# Patient Record
Sex: FEMALE | Race: WHITE | NOT HISPANIC OR LATINO | Employment: OTHER | URBAN - METROPOLITAN AREA
[De-identification: names, ages, dates, MRNs, and addresses within clinical notes are randomized per-mention and may not be internally consistent; named-entity substitution may affect disease eponyms.]

---

## 2023-03-16 ENCOUNTER — TELEPHONE (OUTPATIENT)
Dept: PRIMARY CARE | Facility: CLINIC | Age: 88
End: 2023-03-16
Payer: MEDICARE

## 2023-04-11 ENCOUNTER — TELEPHONE (OUTPATIENT)
Dept: PRIMARY CARE | Facility: CLINIC | Age: 88
End: 2023-04-11
Payer: MEDICARE

## 2023-04-11 DIAGNOSIS — M81.0 AGE-RELATED OSTEOPOROSIS WITHOUT CURRENT PATHOLOGICAL FRACTURE: Primary | ICD-10-CM

## 2023-04-25 ENCOUNTER — TELEPHONE (OUTPATIENT)
Dept: PRIMARY CARE | Facility: CLINIC | Age: 88
End: 2023-04-25
Payer: MEDICARE

## 2023-04-27 LAB — CALCIUM (MG/DL) IN SER/PLAS: 9.9 MG/DL (ref 8.6–10.6)

## 2023-06-08 ENCOUNTER — TELEPHONE (OUTPATIENT)
Dept: PRIMARY CARE | Facility: CLINIC | Age: 88
End: 2023-06-08
Payer: MEDICARE

## 2023-06-08 DIAGNOSIS — R73.9 ELEVATED BLOOD SUGAR: ICD-10-CM

## 2023-06-08 DIAGNOSIS — E55.9 VITAMIN D DEFICIENCY: ICD-10-CM

## 2023-06-08 DIAGNOSIS — E78.2 MODERATE MIXED HYPERLIPIDEMIA NOT REQUIRING STATIN THERAPY: ICD-10-CM

## 2023-06-08 DIAGNOSIS — R94.6 ABNORMAL THYROID EXAM: Primary | ICD-10-CM

## 2023-06-14 ENCOUNTER — LAB (OUTPATIENT)
Dept: LAB | Facility: LAB | Age: 88
End: 2023-06-14
Payer: MEDICARE

## 2023-06-14 DIAGNOSIS — E78.2 MODERATE MIXED HYPERLIPIDEMIA NOT REQUIRING STATIN THERAPY: ICD-10-CM

## 2023-06-14 DIAGNOSIS — R94.6 ABNORMAL THYROID EXAM: ICD-10-CM

## 2023-06-14 DIAGNOSIS — R73.9 ELEVATED BLOOD SUGAR: ICD-10-CM

## 2023-06-14 DIAGNOSIS — M81.0 AGE-RELATED OSTEOPOROSIS WITHOUT CURRENT PATHOLOGICAL FRACTURE: ICD-10-CM

## 2023-06-14 DIAGNOSIS — E55.9 VITAMIN D DEFICIENCY: ICD-10-CM

## 2023-06-14 LAB
ALANINE AMINOTRANSFERASE (SGPT) (U/L) IN SER/PLAS: 15 U/L (ref 7–45)
ALBUMIN (G/DL) IN SER/PLAS: 4 G/DL (ref 3.4–5)
ALKALINE PHOSPHATASE (U/L) IN SER/PLAS: 63 U/L (ref 33–136)
ANION GAP IN SER/PLAS: 12 MMOL/L (ref 10–20)
ASPARTATE AMINOTRANSFERASE (SGOT) (U/L) IN SER/PLAS: 21 U/L (ref 9–39)
BILIRUBIN TOTAL (MG/DL) IN SER/PLAS: 0.4 MG/DL (ref 0–1.2)
CALCIDIOL (25 OH VITAMIN D3) (NG/ML) IN SER/PLAS: 69 NG/ML
CALCIUM (MG/DL) IN SER/PLAS: 9.8 MG/DL (ref 8.6–10.6)
CARBON DIOXIDE, TOTAL (MMOL/L) IN SER/PLAS: 28 MMOL/L (ref 21–32)
CHLORIDE (MMOL/L) IN SER/PLAS: 99 MMOL/L (ref 98–107)
CHOLESTEROL (MG/DL) IN SER/PLAS: 183 MG/DL (ref 0–199)
CHOLESTEROL IN HDL (MG/DL) IN SER/PLAS: 61.8 MG/DL
CHOLESTEROL/HDL RATIO: 3
COBALAMIN (VITAMIN B12) (PG/ML) IN SER/PLAS: 549 PG/ML (ref 211–911)
CREATININE (MG/DL) IN SER/PLAS: 0.92 MG/DL (ref 0.5–1.05)
ERYTHROCYTE DISTRIBUTION WIDTH (RATIO) BY AUTOMATED COUNT: 13 % (ref 11.5–14.5)
ERYTHROCYTE MEAN CORPUSCULAR HEMOGLOBIN CONCENTRATION (G/DL) BY AUTOMATED: 33.3 G/DL (ref 32–36)
ERYTHROCYTE MEAN CORPUSCULAR VOLUME (FL) BY AUTOMATED COUNT: 94 FL (ref 80–100)
ERYTHROCYTES (10*6/UL) IN BLOOD BY AUTOMATED COUNT: 3.97 X10E12/L (ref 4–5.2)
GFR FEMALE: 58 ML/MIN/1.73M2
GLUCOSE (MG/DL) IN SER/PLAS: 86 MG/DL (ref 74–99)
HEMATOCRIT (%) IN BLOOD BY AUTOMATED COUNT: 37.2 % (ref 36–46)
HEMOGLOBIN (G/DL) IN BLOOD: 12.4 G/DL (ref 12–16)
LDL: 108 MG/DL (ref 0–99)
LEUKOCYTES (10*3/UL) IN BLOOD BY AUTOMATED COUNT: 6.1 X10E9/L (ref 4.4–11.3)
NRBC (PER 100 WBCS) BY AUTOMATED COUNT: 0 /100 WBC (ref 0–0)
PLATELETS (10*3/UL) IN BLOOD AUTOMATED COUNT: 190 X10E9/L (ref 150–450)
POTASSIUM (MMOL/L) IN SER/PLAS: 5.4 MMOL/L (ref 3.5–5.3)
PROTEIN TOTAL: 6.9 G/DL (ref 6.4–8.2)
SODIUM (MMOL/L) IN SER/PLAS: 134 MMOL/L (ref 136–145)
THYROTROPIN (MIU/L) IN SER/PLAS BY DETECTION LIMIT <= 0.05 MIU/L: 1.8 MIU/L (ref 0.44–3.98)
TRIGLYCERIDE (MG/DL) IN SER/PLAS: 64 MG/DL (ref 0–149)
UREA NITROGEN (MG/DL) IN SER/PLAS: 29 MG/DL (ref 6–23)
VLDL: 13 MG/DL (ref 0–40)

## 2023-06-14 PROCEDURE — 82607 VITAMIN B-12: CPT

## 2023-06-14 PROCEDURE — 85027 COMPLETE CBC AUTOMATED: CPT

## 2023-06-14 PROCEDURE — 36415 COLL VENOUS BLD VENIPUNCTURE: CPT

## 2023-06-14 PROCEDURE — 80053 COMPREHEN METABOLIC PANEL: CPT

## 2023-06-14 PROCEDURE — 84443 ASSAY THYROID STIM HORMONE: CPT

## 2023-06-14 PROCEDURE — 82306 VITAMIN D 25 HYDROXY: CPT

## 2023-06-14 PROCEDURE — 80061 LIPID PANEL: CPT

## 2023-06-14 PROCEDURE — 83036 HEMOGLOBIN GLYCOSYLATED A1C: CPT

## 2023-06-15 LAB
ESTIMATED AVERAGE GLUCOSE FOR HBA1C: 120 MG/DL
HEMOGLOBIN A1C/HEMOGLOBIN TOTAL IN BLOOD: 5.8 %

## 2023-06-16 ENCOUNTER — OFFICE VISIT (OUTPATIENT)
Dept: PRIMARY CARE | Facility: CLINIC | Age: 88
End: 2023-06-16
Payer: MEDICARE

## 2023-06-16 VITALS
BODY MASS INDEX: 22.78 KG/M2 | HEART RATE: 92 BPM | OXYGEN SATURATION: 95 % | SYSTOLIC BLOOD PRESSURE: 151 MMHG | DIASTOLIC BLOOD PRESSURE: 68 MMHG | HEIGHT: 60 IN | WEIGHT: 116 LBS

## 2023-06-16 DIAGNOSIS — M81.0 OSTEOPOROSIS, UNSPECIFIED OSTEOPOROSIS TYPE, UNSPECIFIED PATHOLOGICAL FRACTURE PRESENCE: Primary | ICD-10-CM

## 2023-06-16 PROBLEM — R39.11 URINARY HESITANCY: Status: ACTIVE | Noted: 2023-06-16

## 2023-06-16 PROBLEM — H52.209 ASTIGMATISM: Status: ACTIVE | Noted: 2023-06-07

## 2023-06-16 PROBLEM — G47.33 OBSTRUCTIVE SLEEP APNEA, ADULT: Status: ACTIVE | Noted: 2023-06-16

## 2023-06-16 PROBLEM — F43.0 STRESS REACTION: Status: ACTIVE | Noted: 2023-06-16

## 2023-06-16 PROBLEM — I34.1 MVP (MITRAL VALVE PROLAPSE): Status: ACTIVE | Noted: 2023-06-16

## 2023-06-16 PROBLEM — E87.6 HYPOKALEMIA: Status: ACTIVE | Noted: 2023-06-16

## 2023-06-16 PROBLEM — E87.1 HYPONATREMIA: Status: ACTIVE | Noted: 2023-06-16

## 2023-06-16 PROBLEM — E04.1 THYROID NODULE: Status: ACTIVE | Noted: 2023-06-16

## 2023-06-16 PROBLEM — R41.844 EXECUTIVE FUNCTION DEFICIT: Status: ACTIVE | Noted: 2023-06-16

## 2023-06-16 PROBLEM — H49.22 6TH NERVE PALSY, LEFT: Status: ACTIVE | Noted: 2023-06-07

## 2023-06-16 PROBLEM — R35.0 FREQUENT URINATION: Status: ACTIVE | Noted: 2023-06-16

## 2023-06-16 PROBLEM — H01.009 BLEPHARITIS: Status: ACTIVE | Noted: 2023-06-07

## 2023-06-16 PROBLEM — N95.8 GENITOURINARY SYNDROME OF MENOPAUSE: Status: ACTIVE | Noted: 2023-06-16

## 2023-06-16 PROCEDURE — 1160F RVW MEDS BY RX/DR IN RCRD: CPT | Performed by: FAMILY MEDICINE

## 2023-06-16 PROCEDURE — 1036F TOBACCO NON-USER: CPT | Performed by: FAMILY MEDICINE

## 2023-06-16 PROCEDURE — 99213 OFFICE O/P EST LOW 20 MIN: CPT | Performed by: FAMILY MEDICINE

## 2023-06-16 PROCEDURE — 1159F MED LIST DOCD IN RCRD: CPT | Performed by: FAMILY MEDICINE

## 2023-06-16 RX ORDER — DENOSUMAB 60 MG/ML
1 INJECTION SUBCUTANEOUS
COMMUNITY
Start: 2020-06-10 | End: 2023-10-16

## 2023-06-16 RX ORDER — BIOTIN 1 MG
1000 TABLET ORAL 2 TIMES DAILY
COMMUNITY
Start: 2012-12-20

## 2023-06-16 RX ORDER — CALCIUM CARBONATE 600 MG
600 TABLET ORAL 2 TIMES DAILY
COMMUNITY

## 2023-06-16 RX ORDER — TRIAMCINOLONE ACETONIDE 1 MG/G
CREAM TOPICAL
COMMUNITY
Start: 2022-06-30 | End: 2024-02-05 | Stop reason: ENTERED-IN-ERROR

## 2023-06-16 RX ORDER — CONJUGATED ESTROGENS 0.62 MG/G
CREAM VAGINAL
COMMUNITY
End: 2023-07-10

## 2023-06-16 RX ORDER — ACETAMINOPHEN 325 MG/1
650 TABLET ORAL EVERY 8 HOURS PRN
COMMUNITY

## 2023-06-16 RX ORDER — COLLAGENASE SANTYL 250 [ARB'U]/G
OINTMENT TOPICAL
COMMUNITY
Start: 2022-10-11 | End: 2024-01-18 | Stop reason: WASHOUT

## 2023-06-16 RX ORDER — CYANOCOBALAMIN (VITAMIN B-12) 500 MCG
1 TABLET ORAL DAILY
COMMUNITY

## 2023-06-16 ASSESSMENT — ENCOUNTER SYMPTOMS
OCCASIONAL FEELINGS OF UNSTEADINESS: 0
LOSS OF SENSATION IN FEET: 0
DEPRESSION: 0

## 2023-06-16 ASSESSMENT — PATIENT HEALTH QUESTIONNAIRE - PHQ9
1. LITTLE INTEREST OR PLEASURE IN DOING THINGS: NOT AT ALL
2. FEELING DOWN, DEPRESSED OR HOPELESS: NOT AT ALL
SUM OF ALL RESPONSES TO PHQ9 QUESTIONS 1 AND 2: 0

## 2023-06-16 NOTE — PROGRESS NOTES
Subjective   Patient ID: Nikki Quesada is a 89 y.o. female who presents for Follow-up.    HPI   The patient reports that she is on Prolia for her osteoporosis and is taking this medication as scheduled and has no side effects from it. She mentions that she experiences vaginal itching at nights which improves once she uses the Premarin.     Review of Systems  Constitutional: No fever or chills  Cardiovascular: no chest pain, no palpitations and no syncope.   Respiratory: no cough, no shortness of breath during exertion and no shortness of breath at rest.   Gastrointestinal: no abdominal pain, no nausea and no vomiting.  Neuro: No Headache, no dizziness    Objective   /68   Pulse 92   Ht 1.524 m (5')   Wt 52.6 kg (116 lb)   SpO2 95%   BMI 22.65 kg/m²     Physical Exam  Constitutional: Alert and in no acute distress. Well developed, well nourished  Head and Face: Head and face: Normal.    Cardiovascular: Heart rate and rhythm were normal, normal S1 and S2. No peripheral edema.   Pulmonary: No respiratory distress. Clear bilateral breath sounds.  Musculoskeletal: Gait and station: Normal. Muscle strength/tone: Normal.   Skin: Normal skin color and pigmentation, normal skin turgor, and no rash.    Psychiatric: Judgment and insight: Intact. Mood and affect: Normal.    Lab Results   Component Value Date    WBC 6.1 06/14/2023    HGB 12.4 06/14/2023    HCT 37.2 06/14/2023     06/14/2023    CHOL 183 06/14/2023    TRIG 64 06/14/2023    HDL 61.8 06/14/2023    ALT 15 06/14/2023    AST 21 06/14/2023     (L) 06/14/2023    K 5.4 (H) 06/14/2023    CL 99 06/14/2023    CREATININE 0.92 06/14/2023    BUN 29 (H) 06/14/2023    CO2 28 06/14/2023    TSH 1.80 06/14/2023    INR 1.2 (H) 01/21/2020    HGBA1C 5.8 (A) 06/14/2023       CT HEAD WO IV CONTRAST  MRN: [id number]  Patient Name: NIKKI QUESADA     STUDY:  CT HEAD WO CONTRAST  2/8/2023 3:05 pm     INDICATION:  nausea, syncope, ?fall     COMPARISON:  07/23/2022      ACCESSION NUMBER(S):  [id number]     ORDERING CLINICIAN:  WILIAN SEGURA     TECHNIQUE:  Contiguous axial CT images were obtained at  5 mm slice thickness  through the head  without contrast administration.     FINDINGS:  Mild diffuse volume loss is seen bilaterally.  Mild periventricular  white matter changes are seen.     Gray-white differentiation is intact and there is no evidence of  acute cortical infarct.  No mass, mass effect or midline shift is  seen.  There is no evidence of hemorrhage.   The visualized paranasal  sinuses are clear.     IMPRESSION:  1. Diffuse volume loss and areas of nonspecific white matter  hypodensity, which is nonspecific in nature, but is most likely  age-related and/or microvascular in nature.  2. No  CT evidence of acute hemorrhage or infarct.      Assessment/Plan   Diagnoses and all orders for this visit:  Osteoporosis, unspecified osteoporosis type, unspecified pathological fracture presence  -     Follow Up In [facility] - PCP; Future        Dear Grace Ahn     It was my pleasure to take care of you today in the office. Below are the things we discussed today:    1. Osteoporosis: Continue Prolia.     2. Vaginal irritation: Continue Premarin.     3. Low sodium: Recommended that the patient consume a regular amount of salt.     4. Elevated blood pressures: Advised the patient to check ambulatory blood pressures at home when she is relaxed, if numbers are greater than 140 systolic please let me know.     5. Exercise is recommended.     Follow up in 4 months.     Your yearly Physical is due in: December 2023   When you call the office for your yearly Physical, please ask them to inform me to order your blood work, so that you can get the fasting blood work before your appointment and we can discuss the results at your physical.      Please call me if any questions arise from now until your next visit. I will call you after I am done seeing patients. A Doctor is always  available by phone when the office is closed. Please feel free to call for help with any problem that you feel shouldn't wait until the office re-opens.     .

## 2023-06-19 ENCOUNTER — TELEPHONE (OUTPATIENT)
Dept: PRIMARY CARE | Facility: CLINIC | Age: 88
End: 2023-06-19
Payer: MEDICARE

## 2023-06-19 DIAGNOSIS — M25.569 CHRONIC KNEE PAIN, UNSPECIFIED LATERALITY: ICD-10-CM

## 2023-06-19 DIAGNOSIS — G89.29 CHRONIC KNEE PAIN, UNSPECIFIED LATERALITY: ICD-10-CM

## 2023-06-19 DIAGNOSIS — M51.37 DEGENERATION OF LUMBAR OR LUMBOSACRAL INTERVERTEBRAL DISC: Primary | ICD-10-CM

## 2023-06-19 NOTE — TELEPHONE ENCOUNTER
Pt called in would like a referral for physical therapy for left knee and lower back. Good call back number [phone]

## 2023-07-10 DIAGNOSIS — N95.8 GENITOURINARY SYNDROME OF MENOPAUSE: ICD-10-CM

## 2023-07-10 RX ORDER — CONJUGATED ESTROGENS 0.62 MG/G
CREAM VAGINAL
Qty: 30 G | Refills: 2 | Status: SHIPPED | OUTPATIENT
Start: 2023-07-10

## 2023-08-16 ENCOUNTER — TELEPHONE (OUTPATIENT)
Dept: PRIMARY CARE | Facility: CLINIC | Age: 88
End: 2023-08-16
Payer: MEDICARE

## 2023-08-16 DIAGNOSIS — G89.29 CHRONIC KNEE PAIN, UNSPECIFIED LATERALITY: ICD-10-CM

## 2023-08-16 DIAGNOSIS — M54.2 NECK PAIN: Primary | ICD-10-CM

## 2023-08-16 DIAGNOSIS — M25.569 CHRONIC KNEE PAIN, UNSPECIFIED LATERALITY: ICD-10-CM

## 2023-10-02 ENCOUNTER — TELEPHONE (OUTPATIENT)
Dept: PRIMARY CARE | Facility: CLINIC | Age: 88
End: 2023-10-02
Payer: MEDICARE

## 2023-10-02 DIAGNOSIS — M25.569 CHRONIC KNEE PAIN, UNSPECIFIED LATERALITY: Primary | ICD-10-CM

## 2023-10-02 DIAGNOSIS — M51.37 DEGENERATION OF LUMBAR OR LUMBOSACRAL INTERVERTEBRAL DISC: ICD-10-CM

## 2023-10-02 DIAGNOSIS — G89.29 CHRONIC KNEE PAIN, UNSPECIFIED LATERALITY: Primary | ICD-10-CM

## 2023-10-02 DIAGNOSIS — M54.2 NECK PAIN: ICD-10-CM

## 2023-10-03 NOTE — TELEPHONE ENCOUNTER
Spoke with pt. Daughter. Informed them of below note. Pt expressed understanding. Faxed to number provided

## 2023-10-11 ENCOUNTER — APPOINTMENT (OUTPATIENT)
Dept: UROLOGY | Facility: HOSPITAL | Age: 88
End: 2023-10-11
Payer: MEDICARE

## 2023-10-16 ENCOUNTER — OFFICE VISIT (OUTPATIENT)
Dept: PRIMARY CARE | Facility: CLINIC | Age: 88
End: 2023-10-16
Payer: MEDICARE

## 2023-10-16 VITALS
DIASTOLIC BLOOD PRESSURE: 55 MMHG | BODY MASS INDEX: 22.78 KG/M2 | OXYGEN SATURATION: 92 % | HEART RATE: 65 BPM | WEIGHT: 116 LBS | SYSTOLIC BLOOD PRESSURE: 133 MMHG | HEIGHT: 60 IN

## 2023-10-16 DIAGNOSIS — L97.521 NON-PRESSURE CHRONIC ULCER OF OTHER PART OF LEFT FOOT LIMITED TO BREAKDOWN OF SKIN (MULTI): ICD-10-CM

## 2023-10-16 DIAGNOSIS — M81.0 OSTEOPOROSIS, UNSPECIFIED OSTEOPOROSIS TYPE, UNSPECIFIED PATHOLOGICAL FRACTURE PRESENCE: ICD-10-CM

## 2023-10-16 DIAGNOSIS — Z23 ENCOUNTER FOR IMMUNIZATION: ICD-10-CM

## 2023-10-16 DIAGNOSIS — R06.2 WHEEZING: ICD-10-CM

## 2023-10-16 DIAGNOSIS — M81.0 AGE-RELATED OSTEOPOROSIS WITHOUT CURRENT PATHOLOGICAL FRACTURE: Primary | ICD-10-CM

## 2023-10-16 PROBLEM — R42 DIZZINESS: Status: ACTIVE | Noted: 2023-10-16

## 2023-10-16 PROBLEM — R55 SYNCOPE AND COLLAPSE: Status: ACTIVE | Noted: 2023-10-16

## 2023-10-16 PROBLEM — L28.0 LICHEN SIMPLEX CHRONICUS: Status: ACTIVE | Noted: 2023-07-31

## 2023-10-16 PROBLEM — L81.4 OTHER MELANIN HYPERPIGMENTATION: Status: ACTIVE | Noted: 2023-07-31

## 2023-10-16 PROBLEM — T25.232A: Status: ACTIVE | Noted: 2023-10-16

## 2023-10-16 PROBLEM — L91.8 OTHER HYPERTROPHIC DISORDERS OF THE SKIN: Status: ACTIVE | Noted: 2023-07-31

## 2023-10-16 PROBLEM — G47.34 NOCTURNAL HYPOXEMIA: Status: ACTIVE | Noted: 2023-10-16

## 2023-10-16 PROBLEM — L72.8 OTHER FOLLICULAR CYSTS OF THE SKIN AND SUBCUTANEOUS TISSUE: Status: ACTIVE | Noted: 2023-07-31

## 2023-10-16 PROBLEM — Z85.828 PERSONAL HISTORY OF OTHER MALIGNANT NEOPLASM OF SKIN: Status: ACTIVE | Noted: 2023-07-31

## 2023-10-16 PROBLEM — R90.89 ABNORMAL FINDING ON MRI OF BRAIN: Status: ACTIVE | Noted: 2023-10-16

## 2023-10-16 PROBLEM — N76.0 VAGINITIS: Status: ACTIVE | Noted: 2023-10-16

## 2023-10-16 PROBLEM — N89.8 VAGINAL DISCHARGE: Status: ACTIVE | Noted: 2023-10-16

## 2023-10-16 PROBLEM — L24.A2 IRRITANT CONTACT DERMATITIS DUE TO FECAL, URINARY OR DUAL INCONTINENCE: Status: ACTIVE | Noted: 2023-07-31

## 2023-10-16 PROBLEM — D22.5 MELANOCYTIC NEVI OF TRUNK: Status: ACTIVE | Noted: 2023-07-31

## 2023-10-16 PROBLEM — M62.81 MUSCLE WEAKNESS: Status: ACTIVE | Noted: 2023-10-16

## 2023-10-16 PROBLEM — L30.9 DERMATITIS, UNSPECIFIED: Status: ACTIVE | Noted: 2023-07-31

## 2023-10-16 PROBLEM — L29.9 ITCHING: Status: ACTIVE | Noted: 2023-10-16

## 2023-10-16 PROBLEM — R20.2 PARESTHESIA OF SKIN: Status: ACTIVE | Noted: 2023-07-31

## 2023-10-16 PROBLEM — L27.0 GENERALIZED SKIN ERUPTION DUE TO DRUGS AND MEDICAMENTS TAKEN INTERNALLY: Status: ACTIVE | Noted: 2023-07-31

## 2023-10-16 PROBLEM — R26.2 DIFFICULTY WALKING: Status: ACTIVE | Noted: 2023-10-16

## 2023-10-16 PROBLEM — I83.10 VARICOSE VEINS WITH INFLAMMATION: Status: ACTIVE | Noted: 2023-10-16

## 2023-10-16 PROBLEM — D18.01 HEMANGIOMA OF SKIN AND SUBCUTANEOUS TISSUE: Status: ACTIVE | Noted: 2023-07-31

## 2023-10-16 PROBLEM — M17.12 PRIMARY LOCALIZED OSTEOARTHROSIS OF LEFT LOWER LEG: Status: ACTIVE | Noted: 2023-10-16

## 2023-10-16 PROBLEM — L90.5 SCAR CONDITION AND FIBROSIS OF SKIN: Status: ACTIVE | Noted: 2023-07-31

## 2023-10-16 PROBLEM — R35.1 NOCTURIA: Status: ACTIVE | Noted: 2023-10-16

## 2023-10-16 PROBLEM — D48.5 NEOPLASM OF UNCERTAIN BEHAVIOR OF SKIN: Status: ACTIVE | Noted: 2023-07-31

## 2023-10-16 PROBLEM — L85.3 XEROSIS CUTIS: Status: ACTIVE | Noted: 2023-07-31

## 2023-10-16 PROBLEM — T24.112A: Status: ACTIVE | Noted: 2023-10-16

## 2023-10-16 PROBLEM — L71.9 ROSACEA, UNSPECIFIED: Status: ACTIVE | Noted: 2023-07-31

## 2023-10-16 PROBLEM — T45.1X5D ADVERSE EFFECT OF ANTINEOPLASTIC AND IMMUNOSUPPRESSIVE DRUGS, SUBSEQUENT ENCOUNTER: Status: ACTIVE | Noted: 2023-07-31

## 2023-10-16 PROBLEM — M17.9 OSTEOARTHRITIS, KNEE: Status: ACTIVE | Noted: 2023-10-16

## 2023-10-16 LAB
ALBUMIN SERPL BCP-MCNC: 4.4 G/DL (ref 3.4–5)
ALP SERPL-CCNC: 62 U/L (ref 33–136)
ALT SERPL W P-5'-P-CCNC: 10 U/L (ref 7–45)
ANION GAP SERPL CALC-SCNC: 15 MMOL/L (ref 10–20)
AST SERPL W P-5'-P-CCNC: 18 U/L (ref 9–39)
BILIRUB SERPL-MCNC: 0.5 MG/DL (ref 0–1.2)
BUN SERPL-MCNC: 25 MG/DL (ref 6–23)
CALCIUM SERPL-MCNC: 10.3 MG/DL (ref 8.6–10.6)
CHLORIDE SERPL-SCNC: 98 MMOL/L (ref 98–107)
CO2 SERPL-SCNC: 28 MMOL/L (ref 21–32)
CREAT SERPL-MCNC: 0.92 MG/DL (ref 0.5–1.05)
GFR SERPL CREATININE-BSD FRML MDRD: 58 ML/MIN/1.73M*2
GLUCOSE SERPL-MCNC: 101 MG/DL (ref 74–99)
POTASSIUM SERPL-SCNC: 5.3 MMOL/L (ref 3.5–5.3)
PROT SERPL-MCNC: 7.6 G/DL (ref 6.4–8.2)
SODIUM SERPL-SCNC: 136 MMOL/L (ref 136–145)

## 2023-10-16 PROCEDURE — 1160F RVW MEDS BY RX/DR IN RCRD: CPT | Performed by: FAMILY MEDICINE

## 2023-10-16 PROCEDURE — 80053 COMPREHEN METABOLIC PANEL: CPT

## 2023-10-16 PROCEDURE — 1036F TOBACCO NON-USER: CPT | Performed by: FAMILY MEDICINE

## 2023-10-16 PROCEDURE — 1159F MED LIST DOCD IN RCRD: CPT | Performed by: FAMILY MEDICINE

## 2023-10-16 PROCEDURE — G0008 ADMIN INFLUENZA VIRUS VAC: HCPCS | Performed by: FAMILY MEDICINE

## 2023-10-16 PROCEDURE — 36415 COLL VENOUS BLD VENIPUNCTURE: CPT

## 2023-10-16 PROCEDURE — 90662 IIV NO PRSV INCREASED AG IM: CPT | Performed by: FAMILY MEDICINE

## 2023-10-16 PROCEDURE — 1126F AMNT PAIN NOTED NONE PRSNT: CPT | Performed by: FAMILY MEDICINE

## 2023-10-16 PROCEDURE — 99213 OFFICE O/P EST LOW 20 MIN: CPT | Performed by: FAMILY MEDICINE

## 2023-10-16 RX ORDER — ALBUTEROL SULFATE 0.83 MG/ML
3 SOLUTION RESPIRATORY (INHALATION) AS NEEDED
Status: CANCELLED | OUTPATIENT
Start: 2023-11-09

## 2023-10-16 RX ORDER — FAMOTIDINE 10 MG/ML
20 INJECTION INTRAVENOUS ONCE AS NEEDED
Status: CANCELLED | OUTPATIENT
Start: 2023-11-09

## 2023-10-16 RX ORDER — CHLORHEXIDINE GLUCONATE ORAL RINSE 1.2 MG/ML
15 SOLUTION DENTAL 2 TIMES DAILY
COMMUNITY
Start: 2023-04-20 | End: 2023-10-16 | Stop reason: ALTCHOICE

## 2023-10-16 RX ORDER — ALBUTEROL SULFATE 90 UG/1
2 POWDER, METERED RESPIRATORY (INHALATION) EVERY 6 HOURS PRN
Qty: 18 G | Refills: 2 | Status: SHIPPED | OUTPATIENT
Start: 2023-10-16

## 2023-10-16 RX ORDER — NITROFURANTOIN MACROCRYSTALS 50 MG/1
1 CAPSULE ORAL 2 TIMES DAILY
COMMUNITY
Start: 2022-11-25 | End: 2023-10-16 | Stop reason: DRUGHIGH

## 2023-10-16 RX ORDER — EPINEPHRINE 0.3 MG/.3ML
0.3 INJECTION SUBCUTANEOUS EVERY 5 MIN PRN
Status: CANCELLED | OUTPATIENT
Start: 2023-11-09

## 2023-10-16 RX ORDER — DIPHENHYDRAMINE HYDROCHLORIDE 50 MG/ML
50 INJECTION INTRAMUSCULAR; INTRAVENOUS AS NEEDED
Status: CANCELLED | OUTPATIENT
Start: 2023-11-09

## 2023-10-16 RX ORDER — ALBUTEROL SULFATE 90 UG/1
POWDER, METERED RESPIRATORY (INHALATION) EVERY 6 HOURS
COMMUNITY
Start: 2022-12-06 | End: 2023-10-16 | Stop reason: SDUPTHER

## 2023-10-16 ASSESSMENT — PATIENT HEALTH QUESTIONNAIRE - PHQ9
SUM OF ALL RESPONSES TO PHQ9 QUESTIONS 1 AND 2: 0
1. LITTLE INTEREST OR PLEASURE IN DOING THINGS: NOT AT ALL
2. FEELING DOWN, DEPRESSED OR HOPELESS: NOT AT ALL

## 2023-10-16 ASSESSMENT — ENCOUNTER SYMPTOMS
LOSS OF SENSATION IN FEET: 0
DEPRESSION: 0
OCCASIONAL FEELINGS OF UNSTEADINESS: 0

## 2023-10-16 NOTE — PROGRESS NOTES
Subjective   Patient ID: Nikki Quesada is a 89 y.o. female who presents for Follow-up.    HPI Doing well. Due for Prolia in Nov.  Has shoulder and neck pain getting PT for Neck. Thinking about massage. Occasional wheezing.     Review of Systems  Constitutional: No fever or chills  Cardiovascular: no chest pain, no palpitations and no syncope.   Respiratory: no cough, no shortness of breath during exertion and no shortness of breath at rest.   Gastrointestinal: no abdominal pain, no nausea and no vomiting.  Neuro: No Headache, no dizziness    Objective   /55   Pulse 65   Ht 1.524 m (5')   Wt 52.6 kg (116 lb)   SpO2 92%   BMI 22.65 kg/m²     Physical Exam  Constitutional: Alert and in no acute distress. Well developed, well nourished  Head and Face: Head and face: Normal.    Cardiovascular: Heart rate and rhythm were normal, normal S1 and S2. No peripheral edema.   Pulmonary: No respiratory distress. Clear bilateral breath sounds.  Musculoskeletal: Gait and station: Normal. Muscle strength/tone: Normal.   Skin: Normal skin color and pigmentation, normal skin turgor, and no rash.    Psychiatric: Judgment and insight: Intact. Mood and affect: Normal.    Lab Results   Component Value Date    WBC 6.1 06/14/2023    HGB 12.4 06/14/2023    HCT 37.2 06/14/2023     06/14/2023    CHOL 183 06/14/2023    TRIG 64 06/14/2023    HDL 61.8 06/14/2023    ALT 15 06/14/2023    AST 21 06/14/2023     (L) 06/14/2023    K 5.4 (H) 06/14/2023    CL 99 06/14/2023    CREATININE 0.92 06/14/2023    BUN 29 (H) 06/14/2023    CO2 28 06/14/2023    TSH 1.80 06/14/2023    INR 1.2 (H) 01/21/2020    HGBA1C 5.8 (A) 06/14/2023       CT HEAD WO IV CONTRAST  MRN: [id number]  Patient Name: NIKKI QUESADA     STUDY:  CT HEAD WO CONTRAST  2/8/2023 3:05 pm     INDICATION:  nausea, syncope, ?fall     COMPARISON:  07/23/2022     ACCESSION NUMBER(S):  [id number]     ORDERING CLINICIAN:  WILIAN SEGURA     TECHNIQUE:  Contiguous axial CT images  were obtained at  5 mm slice thickness  through the head  without contrast administration.     FINDINGS:  Mild diffuse volume loss is seen bilaterally.  Mild periventricular  white matter changes are seen.     Gray-white differentiation is intact and there is no evidence of  acute cortical infarct.  No mass, mass effect or midline shift is  seen.  There is no evidence of hemorrhage.   The visualized paranasal  sinuses are clear.     IMPRESSION:  1. Diffuse volume loss and areas of nonspecific white matter  hypodensity, which is nonspecific in nature, but is most likely  age-related and/or microvascular in nature.  2. No  CT evidence of acute hemorrhage or infarct.      Assessment/Plan   Diagnoses and all orders for this visit:  Age-related osteoporosis without current pathological fracture  -     denosumab (Prolia) 60 mg/mL syringe; Inject 1 mL (60 mg total) under the skin every 6 months.  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  -     Aerochamber Spacer Device  Other orders  -     denosumab (Prolia) injection 60 mg  -     sodium chloride 0.9 % bolus 500 mL  -     dextrose 5 % in water (D5W) bolus  -     diphenhydrAMINE (BENADryl) injection 50 mg  -     methylPREDNISolone sod succinate (PF) (SOLU-Medrol) 40 mg/mL injection 40 mg  -     famotidine PF (Pepcid) injection 20 mg  -     EPINEPHrine (Epipen) injection syringe 0.3  mg  -     albuterol 2.5 mg /3 mL (0.083 %) nebulizer solution 3 mL        Dear Grace Ahn     It was my pleasure to take care of you today in the office. Below are the things we discussed today:    1. Osteoporosis - Continue Prolia    2. Wheezing - use albuterol as needed    3. Neck and shoulder Pain - Continue PT      Your yearly Physical is due in: Feb 2024  When you call the office for your yearly Physical, please ask them to inform me to order your blood work, so that you can get the fasting blood work before your appointment and we can discuss the results at your physical.      Please call me if any questions arise from now until your next visit. I will call you after I am done seeing patients. A Doctor is always available by phone when the office is closed. Please feel free to call for help with any problem that you feel shouldn't wait until the office re-opens.     Naima Quiñonez MD

## 2023-10-17 ENCOUNTER — OFFICE VISIT (OUTPATIENT)
Dept: DERMATOLOGY | Facility: CLINIC | Age: 88
End: 2023-10-17
Payer: MEDICARE

## 2023-10-17 DIAGNOSIS — Z12.83 SCREENING EXAM FOR SKIN CANCER: ICD-10-CM

## 2023-10-17 DIAGNOSIS — D22.9 MULTIPLE BENIGN NEVI: ICD-10-CM

## 2023-10-17 DIAGNOSIS — L57.0 ACTINIC KERATOSIS: Primary | ICD-10-CM

## 2023-10-17 DIAGNOSIS — L81.4 LENTIGO: ICD-10-CM

## 2023-10-17 DIAGNOSIS — L82.1 SEBORRHEIC KERATOSIS: ICD-10-CM

## 2023-10-17 DIAGNOSIS — Z85.89 HISTORY OF SQUAMOUS CELL CARCINOMA: ICD-10-CM

## 2023-10-17 DIAGNOSIS — Z85.828 HISTORY OF BASAL CELL CANCER: ICD-10-CM

## 2023-10-17 PROBLEM — L91.8 OTHER HYPERTROPHIC DISORDERS OF THE SKIN: Status: RESOLVED | Noted: 2023-07-31 | Resolved: 2023-10-17

## 2023-10-17 PROBLEM — L72.8 OTHER FOLLICULAR CYSTS OF THE SKIN AND SUBCUTANEOUS TISSUE: Status: RESOLVED | Noted: 2023-07-31 | Resolved: 2023-10-17

## 2023-10-17 PROBLEM — D18.01 HEMANGIOMA OF SKIN AND SUBCUTANEOUS TISSUE: Status: RESOLVED | Noted: 2023-07-31 | Resolved: 2023-10-17

## 2023-10-17 PROBLEM — D22.5 MELANOCYTIC NEVI OF TRUNK: Status: RESOLVED | Noted: 2023-07-31 | Resolved: 2023-10-17

## 2023-10-17 PROBLEM — L30.9 DERMATITIS, UNSPECIFIED: Status: RESOLVED | Noted: 2023-07-31 | Resolved: 2023-10-17

## 2023-10-17 PROBLEM — D48.5 NEOPLASM OF UNCERTAIN BEHAVIOR OF SKIN: Status: RESOLVED | Noted: 2023-07-31 | Resolved: 2023-10-17

## 2023-10-17 PROBLEM — L90.5 SCAR CONDITION AND FIBROSIS OF SKIN: Status: RESOLVED | Noted: 2023-07-31 | Resolved: 2023-10-17

## 2023-10-17 PROCEDURE — 99213 OFFICE O/P EST LOW 20 MIN: CPT | Performed by: DERMATOLOGY

## 2023-10-17 PROCEDURE — 1126F AMNT PAIN NOTED NONE PRSNT: CPT | Performed by: DERMATOLOGY

## 2023-10-17 PROCEDURE — 1159F MED LIST DOCD IN RCRD: CPT | Performed by: DERMATOLOGY

## 2023-10-17 PROCEDURE — 17003 DESTRUCT PREMALG LES 2-14: CPT | Performed by: DERMATOLOGY

## 2023-10-17 PROCEDURE — 1160F RVW MEDS BY RX/DR IN RCRD: CPT | Performed by: DERMATOLOGY

## 2023-10-17 PROCEDURE — 1036F TOBACCO NON-USER: CPT | Performed by: DERMATOLOGY

## 2023-10-17 PROCEDURE — 17000 DESTRUCT PREMALG LESION: CPT | Performed by: DERMATOLOGY

## 2023-10-17 ASSESSMENT — DERMATOLOGY PATIENT ASSESSMENT
DO YOU USE A TANNING BED: NO
ARE YOU TRYING TO GET PREGNANT: NO
DO YOU USE SUNSCREEN: OCCASIONALLY
ARE YOU AN ORGAN TRANSPLANT RECIPIENT: NO
DO YOU HAVE ANY NEW OR CHANGING LESIONS: NO
ARE YOU ON BIRTH CONTROL: NO
DO YOU HAVE IRREGULAR MENSTRUAL CYCLES: NO
HAVE YOU HAD OR DO YOU HAVE A STAPH INFECTION: NO
HAVE YOU HAD OR DO YOU HAVE VASCULAR DISEASE: NO

## 2023-10-17 ASSESSMENT — PATIENT GLOBAL ASSESSMENT (PGA): PATIENT GLOBAL ASSESSMENT: PATIENT GLOBAL ASSESSMENT:  1 - CLEAR

## 2023-10-17 ASSESSMENT — DERMATOLOGY QUALITY OF LIFE (QOL) ASSESSMENT
ARE THERE EXCLUSIONS OR EXCEPTIONS FOR THE QUALITY OF LIFE ASSESSMENT: NO
RATE HOW BOTHERED YOU ARE BY SYMPTOMS OF YOUR SKIN PROBLEM (EG, ITCHING, STINGING BURNING, HURTING OR SKIN IRRITATION): 0 - NEVER BOTHERED
RATE HOW EMOTIONALLY BOTHERED YOU ARE BY YOUR SKIN PROBLEM (FOR EXAMPLE, WORRY, EMBARRASSMENT, FRUSTRATION): 0 - NEVER BOTHERED
RATE HOW BOTHERED YOU ARE BY EFFECTS OF YOUR SKIN PROBLEMS ON YOUR ACTIVITIES (EG, GOING OUT, ACCOMPLISHING WHAT YOU WANT, WORK ACTIVITIES OR YOUR RELATIONSHIPS WITH OTHERS): 0 - NEVER BOTHERED
DATE THE QUALITY-OF-LIFE ASSESSMENT WAS COMPLETED: 66764

## 2023-10-17 ASSESSMENT — ITCH NUMERIC RATING SCALE: HOW SEVERE IS YOUR ITCHING?: 0

## 2023-10-17 NOTE — PROGRESS NOTES
Subjective     Grace Ahn is a 89 y.o. female who presents for the following: Skin Check.     Review of Systems:  No other skin or systemic complaints other than what is documented elsewhere in the note.    The following portions of the chart were reviewed this encounter and updated as appropriate:  Tobacco  Allergies  Meds  Problems  Med Hx  Surg Hx         Skin Cancer History  No skin cancer on file.    Lesion 1: Squamous Cell Carcinoma. Year Diagnosed: 2014. November. Location: Forehead. Treatment(s): Staged Excision. Excision performed by Dr. Keshawn Schultz (Plastics) in [place]     Lesion 2: Basal Cell Carcinoma. Year Diagnosed: 2014. January. Location: Left Lower Lip. Treatment(s): Staged Excision. Treated by: Done by Mohs in [place]      Specialty Problems          Dermatology Problems    AK (actinic keratosis)     Formatting of this note might be different from the original. To back, face, scalp Formatting of this note might be different from the original. Below nose,upper lip,left shoulder         Generalized skin eruption due to drugs and medicaments taken internally    Irritant contact dermatitis due to fecal, urinary or dual incontinence    Lichen simplex chronicus    Personal history of other malignant neoplasm of skin     Lesion 1: Squamous Cell Carcinoma. Year Diagnosed: 2014. November. Location: Forehead. Treatment(s): Staged Excision. Excision performed by Dr. Keshawn Schultz (Plastics) in [place]     Lesion 2: Basal Cell Carcinoma. Year Diagnosed: 2014. January. Location: Left Lower Lip. Treatment(s): Staged Excision. 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    A full examination was performed including scalp, head, eyes, ears, nose, lips, neck, chest, axillae, abdomen, back, buttocks, bilateral upper extremities, bilateral lower extremities, hands, feet, fingers, toes, fingernails, and toenails. All findings within normal limits unless otherwise noted below.    Assessment/Plan   1. Actinic keratosis (2)  Head - Anterior (Face) (2)  Erythematous macules with gritty scale. Mid forehead x1, Right palm x1     Destr of lesion - Head - Anterior (Face)  Complexity: simple    Destruction method: cryotherapy    Informed consent: discussed and consent obtained    Lesion destroyed using liquid nitrogen: Yes    Cryotherapy cycles:  1  Outcome: patient tolerated procedure well with no complications    Post-procedure details: wound care instructions given      2. Multiple benign nevi  Scattered, uniform and benign-appearing, regular brown melanocytic papules and macules.    - Discussed benign nature and that no treatment is necessary unless it becomes painful or increases in size. Patient opts for clinical monitoring at this time.     3. Lentigo  Scattered tan macules in sun-exposed areas.    - Discussed benign nature and that no treatment is necessary unless it becomes painful or increases in size. Patient opts for clinical monitoring at this time.     4. Seborrheic keratosis  Stuck on verrucous, tan-brown papules and plaques.      - Discussed benign nature and that no treatment is necessary unless it becomes painful or increases in size. Patient opts for clinical monitoring at this time.     5. History of basal cell cancer    6. History of squamous cell carcinoma    7. Screening exam for skin cancer    Full body skin exam  -No lesions concerning for malignancy on the remainder the skin exam today   -Scars from prior skin treatments were evaluated and no evidence of recurrence.  - The ugly duckling sign was discussed. Monitor for any skin lesions that are different in  color, shape, or size than others on body  -Sun protection was discussed. Recommend SPF 30+, hats with brims, sun protective clothing, and avoiding sun exposure between 10 AM and 2 PM whenever possible  -Recommend regular skin exams or sooner if new or changing lesions       Related Procedures  Follow Up In Dermatology - Established Patient      Has upcoming appt with Dr. Lentz for vulvar biopsy, possible lichen sclerosus    4 months FSE (prefers close followup)

## 2023-10-26 ENCOUNTER — OFFICE VISIT (OUTPATIENT)
Dept: UROLOGY | Facility: CLINIC | Age: 88
End: 2023-10-26
Payer: MEDICARE

## 2023-10-26 VITALS
SYSTOLIC BLOOD PRESSURE: 154 MMHG | BODY MASS INDEX: 22.5 KG/M2 | WEIGHT: 114.6 LBS | HEART RATE: 62 BPM | TEMPERATURE: 96.8 F | HEIGHT: 60 IN | RESPIRATION RATE: 14 BRPM | DIASTOLIC BLOOD PRESSURE: 64 MMHG

## 2023-10-26 DIAGNOSIS — R35.0 FREQUENT URINATION: ICD-10-CM

## 2023-10-26 DIAGNOSIS — N89.8 VAGINAL IRRITATION: ICD-10-CM

## 2023-10-26 LAB
POC APPEARANCE, URINE: CLEAR
POC BILIRUBIN, URINE: NEGATIVE
POC BLOOD, URINE: ABNORMAL
POC COLOR, URINE: YELLOW
POC GLUCOSE, URINE: NEGATIVE MG/DL
POC KETONES, URINE: NEGATIVE MG/DL
POC LEUKOCYTES, URINE: NEGATIVE
POC NITRITE,URINE: NEGATIVE
POC PH, URINE: 7.5 PH
POC PROTEIN, URINE: ABNORMAL MG/DL
POC SPECIFIC GRAVITY, URINE: 1.01
POC UROBILINOGEN, URINE: 0.2 EU/DL

## 2023-10-26 PROCEDURE — 81003 URINALYSIS AUTO W/O SCOPE: CPT | Performed by: OBSTETRICS & GYNECOLOGY

## 2023-10-26 PROCEDURE — 1126F AMNT PAIN NOTED NONE PRSNT: CPT | Performed by: OBSTETRICS & GYNECOLOGY

## 2023-10-26 PROCEDURE — 1036F TOBACCO NON-USER: CPT | Performed by: OBSTETRICS & GYNECOLOGY

## 2023-10-26 PROCEDURE — 88305 TISSUE EXAM BY PATHOLOGIST: CPT

## 2023-10-26 PROCEDURE — 88305 TISSUE EXAM BY PATHOLOGIST: CPT | Performed by: DERMATOLOGY

## 2023-10-26 PROCEDURE — 1159F MED LIST DOCD IN RCRD: CPT | Performed by: OBSTETRICS & GYNECOLOGY

## 2023-10-26 PROCEDURE — 1160F RVW MEDS BY RX/DR IN RCRD: CPT | Performed by: OBSTETRICS & GYNECOLOGY

## 2023-10-26 PROCEDURE — 56605 BIOPSY OF VULVA/PERINEUM: CPT | Performed by: OBSTETRICS & GYNECOLOGY

## 2023-10-26 ASSESSMENT — PAIN SCALES - GENERAL: PAINLEVEL: 0-NO PAIN

## 2023-10-26 NOTE — PROGRESS NOTES
UROLOGIC FOLLOW-UP VISIT     PROBLEM LIST:  1.   1. Frequent urination  POCT UA Automated manually resulted      2. Vulvar lesion     HISTORY OF PRESENT ILLNESS:   Grace Ahn is a 89 y.o. female who was first seen on 09/21/23 for evaluation of her vaginal irritation.     Charlee is a known patient to our office. The last time we saw her she had an area on the left side of her vulva that we were concerned about. We talked about doing a vulvar biopsy at that appointment.    She has been utilizing desitin for the last 10 days to a week. Her daughter explains she has been utilizing her vaginal estrogen.      INTERVAL HISTORY:      PAST MEDICAL HISTORY:  Past Medical History:   Diagnosis Date    Acute cystitis without hematuria 02/10/2020    Acute cystitis without hematuria    Adverse effect of unspecified drugs, medicaments and biological substances, initial encounter 09/09/2021    Adverse reaction to drug, initial encounter    Delusional disorders (CMS/[facility]) 11/02/2021    Delusions    Laceration without foreign body, left lower leg, initial encounter 03/15/2020    Skin tear of left lower leg without complication    Other fracture of left patella, initial encounter for closed fracture 02/16/2020    Other fracture of left patella, initial encounter for closed fracture    Personal history of other specified conditions 02/24/2020    History of nocturia    Personal history of urinary (tract) infections 12/08/2021    History of urinary tract infection    Unspecified fracture of left patella, initial encounter for closed fracture 02/06/2020    Left patella fracture       PAST SURGICAL HISTORY:  Past Surgical History:   Procedure Laterality Date    MR HEAD ANGIO WO IV CONTRAST  7/25/2022    MR HEAD ANGIO WO IV CONTRAST 7/25/2022 [facility]    MR NECK ANGIO WO IV CONTRAST  7/25/2022    MR NECK ANGIO WO IV CONTRAST 7/25/2022 [facility] CLINICAL [facility]GAC[facility]    OTHER SURGICAL HISTORY  12/10/2019    Tonsillectomy with  adenoidectomy    OTHER SURGICAL HISTORY  12/10/2019    Hysterectomy total abdominal with removal of both ovaries    OTHER SURGICAL HISTORY  12/10/2019    Root canal procedure    OTHER SURGICAL HISTORY  12/10/2019    Breast biopsy    OTHER SURGICAL HISTORY  12/10/2019     section    OTHER SURGICAL HISTORY  2021    Cataract surgery    OTHER SURGICAL HISTORY  2022    Carpal tunnel surgery    OTHER SURGICAL HISTORY  2022    Varicose vein sclerotherapy    OTHER SURGICAL HISTORY  2022    Excision of squamous cell carcinoma    OTHER SURGICAL HISTORY  2022    Mohs surgery        ALLERGIES:   Allergies   Allergen Reactions    Bacitracin-Polymyxin B Unknown    Latex Other    Penicillins Hives    Adhesive Tape-Silicones Rash    Bacitracin Rash    Cephalosporins Hives and Rash    Chlorine Rash    Ciprofloxacin Rash and Unknown    Doxycycline Rash    Neomycin Rash    Omeprazole Rash    Pantoprazole Rash    Sulfamethoxazole Rash        MEDICATIONS:   [unfilled]     SOCIAL HISTORY:  Patient  reports that she has never smoked. She has never used smokeless tobacco.   Social History     Socioeconomic History    Marital status:      Spouse name: Not on file    Number of children: Not on file    Years of education: Not on file    Highest education level: Not on file   Occupational History    Not on file   Tobacco Use    Smoking status: Never    Smokeless tobacco: Never   Substance and Sexual Activity    Alcohol use: Not on file    Drug use: Not on file    Sexual activity: Not on file   Other Topics Concern    Not on file   Social History Narrative    Not on file     Social Determinants of Health     Financial Resource Strain: Not on file   Food Insecurity: Not on file   Transportation Needs: Not on file   Physical Activity: Not on file   Stress: Not on file   Social Connections: Not on file   Intimate Partner Violence: Not on file   Housing Stability: Not on file       FAMILY HISTORY:  No  family history on file.    REVIEW OF SYSTEMS:  Constitutional: Negative for fever and chills. Denies anorexia, weight loss.  Eyes: Negative for visual disturbance.   Respiratory: Negative for shortness of breath.    Cardiovascular: Negative for chest pain.   Gastrointestinal: Negative for nausea and vomiting.   Genitourinary: See interval history above.  Skin: Negative for rash. Vulvar lesion on the left, white approx 4 mm diameter  Neurological: Negative for dizziness and numbness.   Psychiatric/Behavioral: Negative for confusion and decreased concentration.     PHYSICAL EXAM:  Blood pressure 154/64, pulse 62, temperature 36 °C (96.8 °F), resp. rate 14, height 1.524 m (5'), weight 52 kg (114 lb 9.6 oz).  Constitutional: Patient appears well-developed and well-nourished. No distress.    Head: Normocephalic and atraumatic.    Neck: Normal range of motion.    Cardiovascular: Normal rate.    Pulmonary/Chest: Effort normal. No respiratory distress.     Musculoskeletal: Normal range of motion.    Neurological: Alert and oriented to person, place, and time.  Psychiatric: Normal mood and affect. Behavior is normal. Thought content normal.          Lab Results   Component Value Date    BUN 25 (H) 10/16/2023    CREATININE 0.92 10/16/2023    EGFR 58 (L) 10/16/2023     10/16/2023    K 5.3 10/16/2023    CL 98 10/16/2023    CO2 28 10/16/2023    CALCIUM 10.3 10/16/2023      Lab Results   Component Value Date    WBC 6.1 06/14/2023    RBC 3.97 (L) 06/14/2023    HGB 12.4 06/14/2023    HCT 37.2 06/14/2023    MCV 94 06/14/2023    MCHC 33.3 06/14/2023    RDW 13.0 06/14/2023     06/14/2023           Assessment:      1. Frequent urination  POCT UA Automated manually resulted          Grace Ahn is a 89 y.o. female with vaginal irritation     Consent for the procedure was given and risks benefits and alternatives included bleeding, infection need for future procedure.  All questions addressed.      Examination of the  vulva revealed  findings as previously seen at last visit. Decision for area for biopsy made to include the abnormal area as well as normal appearing skin.  The area was then prepped with Betadine 10 cc of 1% lidocaine was injected after aspirating.         Punch biopsy was then done and sent to pathology.  The area was closed with a single 3-0 Vicryl horizontal mattress suture.      Hemostasis was achieved.  The patient tolerated the procedure well and was given instructions on wound healing.           Pathology results should return in 3 to 4 weeks at which time we will have a follow-up appointment.        Plan:    As we found similar findings on her physical exam, we decided to move forward with a vulvar biopsy. She tolerated the left vulvar biopsy well. We will send the tissue sample to pathology and will follow up with the results when they are in.      Scribe Attestation  By signing my name below, Meenakshi MAYO Scribe   attest that this documentation has been prepared under the direction and in the presence of Carlotta Lentz MD MPH.

## 2023-11-01 LAB
LABORATORY COMMENT REPORT: NORMAL
PATH REPORT.FINAL DX SPEC: NORMAL
PATH REPORT.GROSS SPEC: NORMAL
PATH REPORT.RELEVANT HX SPEC: NORMAL
PATH REPORT.TOTAL CANCER: NORMAL

## 2023-11-07 ASSESSMENT — PATIENT HEALTH QUESTIONNAIRE - PHQ9
9. THOUGHTS THAT YOU WOULD BE BETTER OFF DEAD, OR OF HURTING YOURSELF: NOT AT ALL
5. POOR APPETITE OR OVEREATING: NOT AT ALL
7. TROUBLE CONCENTRATING ON THINGS, SUCH AS READING THE NEWSPAPER OR WATCHING TELEVISION: NOT AT ALL
8. MOVING OR SPEAKING SO SLOWLY THAT OTHER PEOPLE COULD HAVE NOTICED. OR THE OPPOSITE, BEING SO FIGETY OR RESTLESS THAT YOU HAVE BEEN MOVING AROUND A LOT MORE THAN USUAL: NOT AT ALL
2. FEELING DOWN, DEPRESSED, IRRITABLE, OR HOPELESS: NOT AT ALL
6. FEELING BAD ABOUT YOURSELF - OR THAT YOU ARE A FAILURE OR HAVE LET YOURSELF OR YOUR FAMILY DOWN: NOT AT ALL
5. POOR APPETITE OR OVEREATING: 0
5. POOR APPETITE OR OVEREATING: NOT AT ALL
2. FEELING DOWN, DEPRESSED OR HOPELESS: NOT AT ALL
2. FEELING DOWN, DEPRESSED, IRRITABLE, OR HOPELESS: 0
10. IF YOU CHECKED OFF ANY PROBLEMS, HOW DIFFICULT HAVE THESE PROBLEMS MADE IT FOR YOU TO DO YOUR WORK, TAKE CARE OF THINGS AT HOME, OR GET ALONG WITH OTHER PEOPLE: NOT DIFFICULT AT ALL
8. MOVING OR SPEAKING SO SLOWLY THAT OTHER PEOPLE COULD HAVE NOTICED. OR THE OPPOSITE, BEING SO FIGETY OR RESTLESS THAT YOU HAVE BEEN MOVING AROUND A LOT MORE THAN USUAL: NOT AT ALL
8. MOVING OR SPEAKING SO SLOWLY THAT OTHER PEOPLE COULD HAVE NOTICED. OR THE OPPOSITE, BEING SO FIGETY OR RESTLESS THAT YOU HAVE BEEN MOVING AROUND A LOT MORE THAN USUAL: 0
1. LITTLE INTEREST OR PLEASURE IN DOING THINGS: NOT AT ALL
1. LITTLE INTEREST OR PLEASURE IN DOING THINGS: NOT AT ALL
7. TROUBLE CONCENTRATING ON THINGS, SUCH AS READING THE NEWSPAPER OR WATCHING TELEVISION: 0
6. FEELING BAD ABOUT YOURSELF - OR THAT YOU ARE A FAILURE OR HAVE LET YOURSELF OR YOUR FAMILY DOWN: NOT AT ALL
3. TROUBLE FALLING OR STAYING ASLEEP OR SLEEPING TOO MUCH: SEVERAL DAYS
9. THOUGHTS THAT YOU WOULD BE BETTER OFF DEAD, OR OF HURTING YOURSELF: NOT AT ALL
9. THOUGHTS THAT YOU WOULD BE BETTER OFF DEAD, OR OF HURTING YOURSELF: 0
7. TROUBLE CONCENTRATING ON THINGS, SUCH AS READING THE NEWSPAPER OR WATCHING TELEVISION: NOT AT ALL
3. TROUBLE FALLING OR STAYING ASLEEP OR SLEEPING TOO MUCH: 1
SUM OF ALL RESPONSES TO PHQ QUESTIONS 1-9: 2
6. FEELING BAD ABOUT YOURSELF - OR THAT YOU ARE A FAILURE OR HAVE LET YOURSELF OR YOUR FAMILY DOWN: 0
4. FEELING TIRED OR HAVING LITTLE ENERGY: SEVERAL DAYS
10. IF YOU CHECKED OFF ANY PROBLEMS, HOW DIFFICULT HAVE THESE PROBLEMS MADE IT FOR YOU TO DO YOUR WORK, TAKE CARE OF THINGS AT HOME, OR GET ALONG WITH OTHER PEOPLE: NOT DIFFICULT AT ALL
SUM OF ALL RESPONSES TO PHQ QUESTIONS 1-9: 2
1. LITTLE INTEREST OR PLEASURE IN DOING THINGS: 0
3. TROUBLE FALLING OR STAYING ASLEEP OR SLEEPING TOO MUCH: SEVERAL DAYS
4. FEELING TIRED OR HAVING LITTLE ENERGY: 1
4. FEELING TIRED OR HAVING LITTLE ENERGY: SEVERAL DAYS

## 2023-11-08 ENCOUNTER — APPOINTMENT (OUTPATIENT)
Dept: UROLOGY | Facility: HOSPITAL | Age: 88
End: 2023-11-08
Payer: MEDICARE

## 2023-11-09 ENCOUNTER — INFUSION (OUTPATIENT)
Dept: INFUSION THERAPY | Facility: CLINIC | Age: 88
End: 2023-11-09
Payer: MEDICARE

## 2023-11-09 VITALS
HEART RATE: 73 BPM | BODY MASS INDEX: 22.78 KG/M2 | SYSTOLIC BLOOD PRESSURE: 156 MMHG | DIASTOLIC BLOOD PRESSURE: 65 MMHG | WEIGHT: 116.62 LBS | TEMPERATURE: 97.2 F | RESPIRATION RATE: 16 BRPM

## 2023-11-09 DIAGNOSIS — M81.0 AGE-RELATED OSTEOPOROSIS WITHOUT CURRENT PATHOLOGICAL FRACTURE: ICD-10-CM

## 2023-11-09 PROCEDURE — 96372 THER/PROPH/DIAG INJ SC/IM: CPT | Performed by: NURSE PRACTITIONER

## 2023-11-09 RX ORDER — FAMOTIDINE 10 MG/ML
20 INJECTION INTRAVENOUS ONCE AS NEEDED
Status: CANCELLED | OUTPATIENT
Start: 2024-04-13

## 2023-11-09 RX ORDER — EPINEPHRINE 0.3 MG/.3ML
0.3 INJECTION SUBCUTANEOUS EVERY 5 MIN PRN
Status: CANCELLED | OUTPATIENT
Start: 2024-04-13

## 2023-11-09 RX ORDER — ALBUTEROL SULFATE 0.83 MG/ML
3 SOLUTION RESPIRATORY (INHALATION) AS NEEDED
Status: CANCELLED | OUTPATIENT
Start: 2024-04-13

## 2023-11-09 RX ORDER — DIPHENHYDRAMINE HYDROCHLORIDE 50 MG/ML
50 INJECTION INTRAMUSCULAR; INTRAVENOUS AS NEEDED
Status: CANCELLED | OUTPATIENT
Start: 2024-04-13

## 2023-11-09 ASSESSMENT — ENCOUNTER SYMPTOMS
RESPIRATORY NEGATIVE: 1
MUSCULOSKELETAL NEGATIVE: 1
CONSTITUTIONAL NEGATIVE: 1
CARDIOVASCULAR NEGATIVE: 1
HEMATOLOGIC/LYMPHATIC NEGATIVE: 1
PSYCHIATRIC NEGATIVE: 1
NEUROLOGICAL NEGATIVE: 1
GASTROINTESTINAL NEGATIVE: 1
EYES NEGATIVE: 1

## 2023-11-09 NOTE — PATIENT INSTRUCTIONS
Today you received: Prolia 60mg subcutaneous injection right upper arm   Next appointment in 6 months   Blood Calcium within 28 days of next visit    For:   1. Age-related osteoporosis without current pathological fracture          Please read the  Medication Guide that was given to you.     (Tell all doctors including dentists that you are taking this medication)     Go to the emergency room or call 911 if:  -You have signs of allergic reaction:   o         Rash, hives, itching.   o         Swollen, blistered, peeling skin.   o         Swelling of face, lips, mouth, tongue or throat.   o         Tightness of chest, trouble breathing, swallowing or talking      Call your doctor:     - If injection site gets red, warm, swollen, itchy or leaks fluid or pus.     (Leave band aid on your injection site for at least 2 hours and keep area clean and dry  - If you get sick or have symptoms of infection or are not feeling well for any reason.    (Wash your hands often, stay away from people who are sick)  - If you have side effects from your medication that do not go away or are bothersome.     (Refer to the teaching your nurse gave you for side effects to call your doctor about)     Common side effects may include:  stuffy nose, headache, feeling tired, muscle aches, upset stomach  - Before receiving any vaccines, Call the [facility] at   if:  - You get sick, are on antibiotics, have had a recent vaccine, have surgery or dental work and your doctor wants your visit rescheduled.  - You need to cancel and reschedule your visit for any reason. Call at least 2 days before your visit if you need to cancel.   - Your insurance changes before your next visit.    (We will need to get approval from your new insurance. This can take up to two weeks.)     The [facility] is opened Monday thru Friday. We are closed on weekends and holidays.     Voice mail will take your call if the center is closed. If  you leave a message please allow 24 hours for a call back during weekdays. If you leave a message on a weekend/holiday, we will call you back the next business day.

## 2023-11-09 NOTE — PROGRESS NOTES
[facility]   infusion Clinic Note   Date: 2023   Name: Grace Ahn  : 1929   MRN: [id number]         Reason for Visit: Injections (Every 6 month Prolia subcutaneous injection)       Visit Type: INJECTION     Ordered By:  Naima Quiñonez MD   Accompanied by:Relative-daughter      Diagnosis: Age-related osteoporosis without current pathological fracture      Allergies:   Allergies as of 2023 - Reviewed 2023   Allergen Reaction Noted    Bacitracin-polymyxin b Unknown 10/05/2010    Latex Other 2023    Penicillins Hives 2008    Adhesive tape-silicones Rash 10/20/2022    Bacitracin Rash 2020    Cephalosporins Hives and Rash 10/20/2022    Chlorine Rash 10/20/2022    Ciprofloxacin Rash and Unknown 10/20/2022    Doxycycline Rash 10/20/2022    Neomycin Rash 10/20/2022    Omeprazole Rash 2023    Pantoprazole Rash 10/20/2022    Sulfamethoxazole Rash 2023        Current Medications has a current medication list which includes the following prescription(s): acetaminophen, proair respiclick, biotin, calcium carbonate, cholecalciferol, denosumab, one-a-day women's 50 plus, premarin, santyl, and triamcinolone.          Vitals:  Vitals:    23 1400   BP: 156/65   Pulse: 73   Resp: 16   Temp: 36.2 °C (97.2 °F)   TempSrc: Temporal   Weight: 52.9 kg (116 lb 10 oz)          Infusion Pre-procedure Checklist:   Allergies reviewed: yes   Medications reviewed: yes     Previous reaction to current treatment: No      Assess patient for the concerns below. Document provider notification as appropriate.  - Active or recent infection with/without current antibiotic use: no  - Recent or planned invasive dental work: no  - Recent or planned surgeries: no  - Recently received or plans to receive vaccinations: yes Received Covid booster and Flu  - Has treatment related toxicities: no  - Is pregnant:  no  - Does the patient meet criteria to treat? Yes     Provider notified? Not applicable      Pain: 0    Is the pain different from normal: n/a   Is the pain tolerable: n/a   Is your Doctor aware: n/a       Labs:   Lab Results   Component Value Date    CALCIUM 10.3 10/16/2023    PHOS 4.7 09/29/2022           Fall Risk Screening: Christine Fall Risk  History of Falling, Immediate or Within 3 Months: No  Ambulatory Aid: Walks without aid/bedrest/nurse assist  Intravenous Therapy/Heparin Lock: No  Gait/Transferring: Normal/bedrest/immobile  Mental Status: Oriented to own ability       Review of Systems   Constitutional: Negative.    HENT:  Negative.     Eyes: Negative.         Glasses for reading   Respiratory: Negative.     Cardiovascular: Negative.    Gastrointestinal: Negative.    Genitourinary: Negative.     Musculoskeletal: Negative.    Skin: Negative.    Neurological: Negative.    Hematological: Negative.    Psychiatric/Behavioral: Negative.           Infusion Readiness:   Assessment Concerns Related to Infusion: No  Provider notified: n/a      Document Below Only If Indicated:   New Patient Education:  N/A (returning patient for continuation of therapy. Ongoing education provided as needed.)       Treatment Conditions & Drug Specific Questions:  Denosumab  (PROLIA. XGEVA)    TREATMENT CONDITIONS:  Unless otherwise specified on patient specific therapy plan HOLD and notify provider prior to proceeding with today's injection if patients:  o Calcium is LESS THAN 8.6 mg/dL OR  Ionized Calcium LESS THAN 1.1 mmol/L  o Recent or planned invasive dental procedure (within 4 weeks)      DRUG SPECIFIC QUESTIONS:  Is the patient taking calcium and vitamin D? Yes   (Recommended)    Pt Instructed on following risks: (1) hypocalcemia, (2) osteonecrosis of the jaw, (3) atypical femoral fractures, (4) serious infections, and (5) dermatologic reactions?  Yes       REMINDER:  REMS DRUG     Weight Based Drug Calculations:  WEIGHT BASED DRUGS: NOT APPLICABLE           Initiated By: Michelle  GIORGI Sloan   Time: 2:32 PM     We administered denosumab.

## 2023-11-28 ENCOUNTER — ANCILLARY PROCEDURE (OUTPATIENT)
Dept: RADIOLOGY | Facility: CLINIC | Age: 88
End: 2023-11-28
Payer: MEDICARE

## 2023-11-28 DIAGNOSIS — M81.0 OSTEOPOROSIS, UNSPECIFIED OSTEOPOROSIS TYPE, UNSPECIFIED PATHOLOGICAL FRACTURE PRESENCE: ICD-10-CM

## 2023-11-28 PROCEDURE — 77080 DXA BONE DENSITY AXIAL: CPT | Performed by: RADIOLOGY

## 2023-11-28 PROCEDURE — 77080 DXA BONE DENSITY AXIAL: CPT

## 2024-01-13 ENCOUNTER — APPOINTMENT (OUTPATIENT)
Dept: PRIMARY CARE | Facility: CLINIC | Age: 89
End: 2024-01-13
Payer: MEDICARE

## 2024-01-16 ENCOUNTER — OFFICE VISIT (OUTPATIENT)
Dept: GERIATRIC MEDICINE | Facility: CLINIC | Age: 89
End: 2024-01-16
Payer: MEDICARE

## 2024-01-16 ENCOUNTER — APPOINTMENT (OUTPATIENT)
Dept: GERIATRIC MEDICINE | Facility: CLINIC | Age: 89
End: 2024-01-16
Payer: MEDICARE

## 2024-01-16 VITALS
BODY MASS INDEX: 23.63 KG/M2 | DIASTOLIC BLOOD PRESSURE: 63 MMHG | TEMPERATURE: 96.2 F | WEIGHT: 121 LBS | RESPIRATION RATE: 18 BRPM | HEART RATE: 83 BPM | SYSTOLIC BLOOD PRESSURE: 172 MMHG

## 2024-01-16 DIAGNOSIS — R41.3 MEMORY IMPAIRMENT: Primary | ICD-10-CM

## 2024-01-16 DIAGNOSIS — Z63.6 CAREGIVER BURDEN: ICD-10-CM

## 2024-01-16 DIAGNOSIS — R45.86 MOOD AND AFFECT DISTURBANCE: ICD-10-CM

## 2024-01-16 LAB
25(OH)D3 SERPL-MCNC: 67 NG/ML (ref 30–100)
ALBUMIN SERPL BCP-MCNC: 4.3 G/DL (ref 3.4–5)
ALP SERPL-CCNC: 63 U/L (ref 33–136)
ALT SERPL W P-5'-P-CCNC: 13 U/L (ref 7–45)
ANION GAP SERPL CALC-SCNC: 13 MMOL/L (ref 10–20)
AST SERPL W P-5'-P-CCNC: 19 U/L (ref 9–39)
BILIRUB SERPL-MCNC: 0.5 MG/DL (ref 0–1.2)
BUN SERPL-MCNC: 23 MG/DL (ref 6–23)
CALCIUM SERPL-MCNC: 9.9 MG/DL (ref 8.6–10.6)
CHLORIDE SERPL-SCNC: 100 MMOL/L (ref 98–107)
CO2 SERPL-SCNC: 28 MMOL/L (ref 21–32)
CREAT SERPL-MCNC: 0.76 MG/DL (ref 0.5–1.05)
EGFRCR SERPLBLD CKD-EPI 2021: 73 ML/MIN/1.73M*2
ERYTHROCYTE [DISTWIDTH] IN BLOOD BY AUTOMATED COUNT: 13.2 % (ref 11.5–14.5)
EST. AVERAGE GLUCOSE BLD GHB EST-MCNC: 120 MG/DL
GLUCOSE SERPL-MCNC: 105 MG/DL (ref 74–99)
HBA1C MFR BLD: 5.8 %
HCT VFR BLD AUTO: 39.4 % (ref 36–46)
HGB BLD-MCNC: 13 G/DL (ref 12–16)
MCH RBC QN AUTO: 32.9 PG (ref 26–34)
MCHC RBC AUTO-ENTMCNC: 33 G/DL (ref 32–36)
MCV RBC AUTO: 100 FL (ref 80–100)
NRBC BLD-RTO: 0 /100 WBCS (ref 0–0)
PLATELET # BLD AUTO: 196 X10*3/UL (ref 150–450)
POTASSIUM SERPL-SCNC: 5.1 MMOL/L (ref 3.5–5.3)
PROT SERPL-MCNC: 7.8 G/DL (ref 6.4–8.2)
RBC # BLD AUTO: 3.95 X10*6/UL (ref 4–5.2)
SODIUM SERPL-SCNC: 136 MMOL/L (ref 136–145)
TSH SERPL-ACNC: 2.61 MIU/L (ref 0.44–3.98)
VIT B12 SERPL-MCNC: 461 PG/ML (ref 211–911)
WBC # BLD AUTO: 7.3 X10*3/UL (ref 4.4–11.3)

## 2024-01-16 PROCEDURE — 1126F AMNT PAIN NOTED NONE PRSNT: CPT | Performed by: INTERNAL MEDICINE

## 2024-01-16 PROCEDURE — 84443 ASSAY THYROID STIM HORMONE: CPT | Performed by: INTERNAL MEDICINE

## 2024-01-16 PROCEDURE — 82306 VITAMIN D 25 HYDROXY: CPT | Performed by: INTERNAL MEDICINE

## 2024-01-16 PROCEDURE — 1036F TOBACCO NON-USER: CPT | Performed by: INTERNAL MEDICINE

## 2024-01-16 PROCEDURE — 85027 COMPLETE CBC AUTOMATED: CPT | Performed by: INTERNAL MEDICINE

## 2024-01-16 PROCEDURE — 83036 HEMOGLOBIN GLYCOSYLATED A1C: CPT | Performed by: INTERNAL MEDICINE

## 2024-01-16 PROCEDURE — 99214 OFFICE O/P EST MOD 30 MIN: CPT | Performed by: INTERNAL MEDICINE

## 2024-01-16 PROCEDURE — 99214 OFFICE O/P EST MOD 30 MIN: CPT | Mod: GC | Performed by: INTERNAL MEDICINE

## 2024-01-16 PROCEDURE — 82607 VITAMIN B-12: CPT | Performed by: INTERNAL MEDICINE

## 2024-01-16 PROCEDURE — 1160F RVW MEDS BY RX/DR IN RCRD: CPT | Performed by: INTERNAL MEDICINE

## 2024-01-16 PROCEDURE — 84075 ASSAY ALKALINE PHOSPHATASE: CPT | Performed by: INTERNAL MEDICINE

## 2024-01-16 PROCEDURE — 36415 COLL VENOUS BLD VENIPUNCTURE: CPT | Performed by: INTERNAL MEDICINE

## 2024-01-16 SDOH — SOCIAL STABILITY - SOCIAL INSECURITY: DEPENDENT RELATIVE NEEDING CARE AT HOME: Z63.6

## 2024-01-16 ASSESSMENT — MONTREAL COGNITIVE ASSESSMENT (MOCA)
12. MEMORY INDEX SCORE: 0
7. [VIGILENCE] TAP WHEN HEARING DESIGNATED LETTER: 1
VISUOSPATIAL/EXECUTIVE SUBSCORE: 3
13. ORIENTATION SUBSCORE: 4
WHAT LEVEL OF EDUCATION WAS ATTAINED: 0
WHAT IS THE TOTAL SCORE (OUT OF 30): 17
11. FOR EACH PAIR OF WORDS, WHAT CATEGORY DO THEY BELONG TO (OUT OF 2): 1
9. REPEAT EACH SENTENCE: 2
8. SERIAL SUBTRACTION OF 7S: 2
4. NAME EACH OF THE THREE ANIMALS SHOWN: 3
5. MEMORY TRIALS: 0
6. READ LIST OF DIGITS [FORWARD/BACKWARD]: 1
10. [FLUENCY] NAME WORDS STARTING WITH DESIGNATED LETTER: 0

## 2024-01-16 ASSESSMENT — PATIENT HEALTH QUESTIONNAIRE - PHQ9
2. FEELING DOWN, DEPRESSED OR HOPELESS: NOT AT ALL
1. LITTLE INTEREST OR PLEASURE IN DOING THINGS: NOT AT ALL
SUM OF ALL RESPONSES TO PHQ9 QUESTIONS 1 AND 2: 0

## 2024-01-16 ASSESSMENT — ENCOUNTER SYMPTOMS
DEPRESSION: 0
OCCASIONAL FEELINGS OF UNSTEADINESS: 0
LOSS OF SENSATION IN FEET: 0

## 2024-01-16 ASSESSMENT — PAIN SCALES - GENERAL: PAINLEVEL: 0-NO PAIN

## 2024-01-16 NOTE — PATIENT INSTRUCTIONS
Will hold off on memory medications such as donepezil or aricept as no improvement noted in 3 months time  Labwork to be done today  Spoke about lexapro- an antidepressant but patient does not want at this time and will discuss again next visit  Schedule for 3 month follow up

## 2024-01-16 NOTE — PROGRESS NOTES
Subjective   Ms. Ahn is 89 y.o. year old female and here for f/u of Annual Exam.  She has a PMH of osteoporosis, vulvar lesions, non-pressure ulcer of the L foot, sleep apnea (as per dtr), and wheezing. Here with her daughter.    Last visit  Per pt discussion/summary:   1. have blood work done when able check kidneys, electrolytes, and blood counts     2. I do recommend counseling   - to talk to someone about the stresses and changes your are going through      3. Options for counseling:  - [facility] (/[place] [address], [phone]   - [facility] in [place] - [phone]  - [facility] - phone counseling and coaching   - [facility] services: [phone]        4. recommend driving evaluation with occupational therapist.   - [facility]: [facility], [facility], or [facility] may be $150  - [facility]   - [facility]: $350      5. follow up in 6-8 months  - repeat MoCA (memory test) at that time      6. continue mentally stimulating and social activities  - also continue exercise- walking. yoga, chair exercises, volleyball       HPI   -Pt states she feels well, has no complaints. Thinks she is doing good health wise.  -Daughter presented to clinic with her. She states patient's memory has been of concern lately, she is forgetting conversations that she had about an hour ago. Also has concerns for her mother to have decreased interest in activities and also gets angry more easily with the daughter. Lots of caregiver burden noted from dtr with deep frustration and pain as she feels she has taken care of her mother greatly for the last 4 years but she does not get appreciation or approval or enough love from her mother, and this is affecting dtr's overall life and functionality and for this reason, she is requesting pt to go to [place] to live with her other sister.  She is expecting to fly pt out soon once she finds an established ILF there.  -no change in appetite, or weight loss concerns.   -denies any constipitation or urination issues, including incontinence.   -patient is adamant on not being on any medications, states her only meds are mostly vitamins. As per daughter, pt has had trauma from other family members with medical field/meds and therefore prefers everything natural. She states she will not take any meds prescribed for memory as she feels her memory is intact.  -of note, patient's mood has changed since her son passing away 2 years ago.  -patient does not want to end up in a NH or senior care, as she values her independence significantly.   -patient feels independent and states she is able to take care of herself, including taking her vitamins/meds. She attends her other apts with her physicians (primary care) and derm.  -patient will be moving to [facility] soon at another [facility] as right now her interaction with her daughter is causing a lot of careburden stress for her daughter.     Home environment: lives at an ILF    Alcohol: denies  Smoking: denies    Is dependant or requires assistance in the following BADL: independent  Is dependant or requires assistance in the following Instrumental ADL: independent to all except transporation, requires her daughter for transportation and requires assistance with finance.    History of Abuse/Neglect/exploitation: caregiver burden stress noted but no abuse noted.    Current Outpatient Medications   Medication Instructions    acetaminophen (Tylenol) 325 mg tablet oral, Every 8 hours PRN    albuterol (ProAir RespiClick) 90 mcg/actuation aerosol powdr breath activated inhaler 2 puffs, inhalation, Every 6 hours PRN    biotin 1,000 mcg, oral, 2 times daily    calcium carbonate 600 mg calcium (1,500 mg) tablet 2 times daily    cholecalciferol (Vitamin D-3) 10 MCG (400 UNIT) tablet 1 tablet, oral, Daily    denosumab (PROLIA) 60 mg,  subcutaneous, Every 6 months    mv,Ca,min-folic acid-vit K1 (One-A-Day Women's 50 Plus) 400-20 mcg tablet 1 tablet, oral, Daily    Premarin vaginal cream APPLY A PEA SIZED AMOUNT TO VAGINA DAILY AT BEDTIME    SantyL 250 unit/gram ointment APPLY NICKEL THICK TO AFFECTED AREA DAILY    triamcinolone (Kenalog) 0.1 % cream PLEASE SEE ATTACHED FOR DETAILED DIRECTIONS        Objective   /63   Pulse 83   Temp 35.7 °C (96.2 °F) (Tympanic)   Resp 18   Wt 54.9 kg (121 lb)   BMI 23.63 kg/m²       Physical Exam  Constitutional: No Acute Distress; Well Kempt  Eyes: PERRLA, EOMI  Ears: auditory canals clear, TM's +LR b/l  ENT: Pharynx clear, neck supple  Lymphatic: No anterior cervical, supraclavicular adenopathy  Cardiovascular: RRR, +S1, S2, no murmurs appreciated, physiologic JVD.  Extremities: no cyanosis, clubbing, or edema. Pedal pulses intact  Respiratory: clear without rales, rhonchi or wheezes  Gastrointestinal: +BS, soft, nontender  Genitourinary: not examined  Musculoskeletal: unremarkable  Integumentary: skin warm, no tenting, no remarkable lesions  Neurological: AAOx2- person and place only- non-focal deficit. Get-up-and-go: gets up slowly using her chair arms to get up  Gait: stable, but slow paced. Requires time to sit down as well on the toilet.  Psychiatric: affect      Lab Results   Component Value Date    TSH 1.80 06/14/2023    TSH 1.50 12/02/2022    TSH 2.08 03/30/2022     Lab Results   Component Value Date    FREET4 1.04 03/05/2020     Lab Results   Component Value Date    FLSAUALE22 549 06/14/2023    QDRFRAHA98 928 (H) 12/02/2022    DMBUAMSP61 585 03/30/2022     Lab Results   Component Value Date    HGBA1C 5.8 (A) 06/14/2023    HGBA1C 6.0 03/10/2021     Lab Results   Component Value Date    VITD25 69 06/14/2023    VITD25 61 12/02/2022    VITD25 58 03/30/2022      Component      Latest Ref Rng 10/16/2023 1/16/2024   GLUCOSE      74 - 99 mg/dL 101 (H)  105 (H)    SODIUM      136 - 145 mmol/L 136  136     POTASSIUM      3.5 - 5.3 mmol/L 5.3  5.1    CHLORIDE      98 - 107 mmol/L 98  100    Bicarbonate      21 - 32 mmol/L 28  28    Anion Gap      10 - 20 mmol/L 15  13    Blood Urea Nitrogen      6 - 23 mg/dL 25 (H)  23    Creatinine      0.50 - 1.05 mg/dL 0.92  0.76    Calcium      8.6 - 10.6 mg/dL 10.3  9.9    Albumin      3.4 - 5.0 g/dL 4.4  4.3    Alkaline Phosphatase      33 - 136 U/L 62  63    Total Protein      6.4 - 8.2 g/dL 7.6  7.8    AST      9 - 39 U/L 18  19    Bilirubin Total      0.0 - 1.2 mg/dL 0.5  0.5    ALT      7 - 45 U/L 10  13    EGFR      >60 mL/min/1.73m*2 58 (L)  73           XR DEXA bone density 11/28/2023    Narrative  Interpreted By:  Renato Lopez,  STUDY:  DEXA BONE [id number]/28/2023 3:21 pm    INDICATION:  Signs/Symptoms:screening. The patient is a 89 y/o  year old F.    COMPARISON:  Previous exam is from 08/18/2021, at which time there was  osteoporosis..    ACCESSION NUMBER(S):  [id number]    ORDERING CLINICIAN:  RICO MACK    TECHNIQUE:  DEXA BONE DENSITY    FINDINGS:  SPINE L1-L4  Bone Mineral Density: 1.313  T-Score 1.2  Z-Score 3.7      LEFT FEMUR -TOTAL  Bone Mineral Density: 0.715  T-Score -2.3   Z-Score  0.7      LEFT FEMUR -NECK  Bone Mineral Density: 0.758  T-Score -2.0  Z-Score 1.0      World Health Organization (WHO) criteria for post-menopausal,   Women:  Normal:         T-score at or above -1 SD  Osteopenia:   T-score between -1 and -2.5 SD  Osteoporosis: T-score at or below -2.5 SD      10-year Fracture Risk:  Major Osteoporotic Fracture  Not reported  Hip Fracture                        Not reported    Note:  If no FRAX score is reported, it is because:  Some T-score for Spine Total or Hip Total or Femoral Neck at or below  -2.5    This exam was performed at [facility] on a GE Lunar Prodig[employer] Advance Dexa Unit.    Impression  DEXA:  According to World Health Organization criteria,  classification is low bone mass  (osteopenia)    Followup recommended in two years or sooner as clinically warranted.    All images and detailed analysis are available on the  Radiology  PACS.    MACRO:  None    Signed by: Renato Lopez 12/1/2023 11:35 AM  Dictation workstation:   [id number]     CT HEAD WO IV CONTRAST 02/08/2023    Narrative  MRN: [id number]  Patient Name: NIKKI QUESADA    STUDY:  CT HEAD WO CONTRAST  2/8/2023 3:05 pm    INDICATION:  nausea, syncope, ?fall    COMPARISON:  07/23/2022    ACCESSION NUMBER(S):  [id number]    ORDERING CLINICIAN:  WILIAN SEGURA    TECHNIQUE:  Contiguous axial CT images were obtained at  5 mm slice thickness  through the head  without contrast administration.    FINDINGS:  Mild diffuse volume loss is seen bilaterally.  Mild periventricular  white matter changes are seen.    Gray-white differentiation is intact and there is no evidence of  acute cortical infarct.  No mass, mass effect or midline shift is  seen.  There is no evidence of hemorrhage.   The visualized paranasal  sinuses are clear.    Impression  1. Diffuse volume loss and areas of nonspecific white matter  hypodensity, which is nonspecific in nature, but is most likely  age-related and/or microvascular in nature.  2. No  CT evidence of acute hemorrhage or infarct.     No results found for this or any previous visit from the past 365 days.        Assessment/Plan   1. Memory concerns for possible dementia  2. Caregiver burden stress  3. Anger and irritability   -last visit 11/2021: patient was living independently until 12/2019 when she fell and broke her knee cap. her daughter brought her up to the [facility] for rehabilitation and stay in an AL. then the pandemic occurred and dtr took her out of the facility and had her in hotel and then apartment with care givers. Then was switched to [facility] in independent living.  dtr noted some forgetfulness during this period, mainly when stressed per dtr. pt denies repeating, misplacing  things or forgetting words. and she was still managing her own meds (supplements) and laundry. dtr has been doing her finances out of convenience. she hasn't driven since the pandemic. was not have difficulty previously. she scored 22/30 on MoCA in 2021 with deficits mainly in recall (1/5) and visuospatial/executive functioning (3/5).  Her [place] today has decreased to 17/30. Deficits in executive function (3/5), attention (4/6 total), and 0/5 in delayed recall with a MIS score of 6/15, and orientation of 4/6.  Dtr continues to note short term forgetfulness. No change in function compared to 2021, only finances and transportation requires assistance, otherwise all other bADLS and iADLs are reportedly independent. Patient is adamant on not starting any meds to help with memory. Risks and benefits explained but patient has strong beliefs against medications 2/2 history of medical problems/trauma in the family, particularly her mother when she was young. Pt also adamant on not going to NH or even [facility].  Pt's dtr reports that her mom can be mean and angry at times. We discussed the possibility of escitalopram to help with mood but pt refuses to take this.  Of note, pt's dtr exhibited significant mood lability, tearfulness, blaming of the patient. She notes that she is receiving counseling and has been advised to discontinue her connection with the patient since it is causing so much stress. She is thinking about bringing her mom to an IL/AL in [place] so that she is closer to her other daughter who lives there.   -of note, labs to be done today including vit b12, vit d 25, tsh, q1c, cmp and cbc.       f/u in 3 months for f/up on memory concerns    Megan Marcano MD  Geriatric Fellow    Attending Addendum:  Chart reviewed, patient examined, labs/imaging reviewed, and key elements of the history and physical examination of the patient confirmed.  I reviewed the fellow's note and made edits where needed in italics. I agree  with the documented findings and plan of care.    MD Tessy Stringer MD

## 2024-02-05 ENCOUNTER — HOSPITAL ENCOUNTER (OUTPATIENT)
Facility: HOSPITAL | Age: 89
Setting detail: OBSERVATION
Discharge: HOME | End: 2024-02-08
Attending: EMERGENCY MEDICINE | Admitting: INTERNAL MEDICINE
Payer: MEDICARE

## 2024-02-05 ENCOUNTER — APPOINTMENT (OUTPATIENT)
Dept: CARDIOLOGY | Facility: HOSPITAL | Age: 89
End: 2024-02-05
Payer: MEDICARE

## 2024-02-05 ENCOUNTER — APPOINTMENT (OUTPATIENT)
Dept: RADIOLOGY | Facility: HOSPITAL | Age: 89
End: 2024-02-05
Payer: MEDICARE

## 2024-02-05 DIAGNOSIS — F02.B0 MODERATE DEMENTIA ASSOCIATED WITH OTHER UNDERLYING DISEASE, WITHOUT BEHAVIORAL DISTURBANCE, PSYCHOTIC DISTURBANCE, MOOD DISTURBANCE, OR ANXIETY (MULTI): Primary | ICD-10-CM

## 2024-02-05 DIAGNOSIS — R55 SYNCOPE AND COLLAPSE: ICD-10-CM

## 2024-02-05 DIAGNOSIS — R41.844 EXECUTIVE FUNCTION DEFICIT: ICD-10-CM

## 2024-02-05 PROBLEM — R05.1 ACUTE COUGH: Status: ACTIVE | Noted: 2024-02-05

## 2024-02-05 LAB
ALBUMIN SERPL BCP-MCNC: 3.6 G/DL (ref 3.4–5)
ALP SERPL-CCNC: 58 U/L (ref 33–136)
ALT SERPL W P-5'-P-CCNC: 9 U/L (ref 7–45)
ANION GAP BLDV CALCULATED.4IONS-SCNC: 10 MMOL/L (ref 10–25)
ANION GAP SERPL CALC-SCNC: 14 MMOL/L (ref 10–20)
AST SERPL W P-5'-P-CCNC: 18 U/L (ref 9–39)
BASE EXCESS BLDV CALC-SCNC: -0.5 MMOL/L (ref -2–3)
BASOPHILS # BLD AUTO: 0.03 X10*3/UL (ref 0–0.1)
BASOPHILS NFR BLD AUTO: 0.3 %
BILIRUB SERPL-MCNC: 0.5 MG/DL (ref 0–1.2)
BODY TEMPERATURE: 37 DEGREES CELSIUS
BUN SERPL-MCNC: 22 MG/DL (ref 6–23)
CA-I BLDV-SCNC: 1.19 MMOL/L (ref 1.1–1.33)
CALCIUM SERPL-MCNC: 9 MG/DL (ref 8.6–10.3)
CARDIAC TROPONIN I PNL SERPL HS: 22 NG/L (ref 0–13)
CARDIAC TROPONIN I PNL SERPL HS: 23 NG/L (ref 0–13)
CHLORIDE BLDV-SCNC: 102 MMOL/L (ref 98–107)
CHLORIDE SERPL-SCNC: 101 MMOL/L (ref 98–107)
CO2 SERPL-SCNC: 23 MMOL/L (ref 21–32)
CREAT SERPL-MCNC: 0.85 MG/DL (ref 0.5–1.05)
EGFRCR SERPLBLD CKD-EPI 2021: 64 ML/MIN/1.73M*2
EOSINOPHIL # BLD AUTO: 0.05 X10*3/UL (ref 0–0.4)
EOSINOPHIL NFR BLD AUTO: 0.5 %
ERYTHROCYTE [DISTWIDTH] IN BLOOD BY AUTOMATED COUNT: 13.2 % (ref 11.5–14.5)
FLUAV RNA RESP QL NAA+PROBE: NOT DETECTED
FLUBV RNA RESP QL NAA+PROBE: NOT DETECTED
GLUCOSE BLDV-MCNC: 117 MG/DL (ref 74–99)
GLUCOSE SERPL-MCNC: 131 MG/DL (ref 74–99)
HCO3 BLDV-SCNC: 24.9 MMOL/L (ref 22–26)
HCT VFR BLD AUTO: 36.2 % (ref 36–46)
HCT VFR BLD EST: 36 % (ref 36–46)
HGB BLD-MCNC: 12.5 G/DL (ref 12–16)
HGB BLDV-MCNC: 12.1 G/DL (ref 12–16)
IMM GRANULOCYTES # BLD AUTO: 0.03 X10*3/UL (ref 0–0.5)
IMM GRANULOCYTES NFR BLD AUTO: 0.3 % (ref 0–0.9)
INHALED O2 CONCENTRATION: 22 %
LACTATE BLDV-SCNC: 1.3 MMOL/L (ref 0.4–2)
LYMPHOCYTES # BLD AUTO: 2.76 X10*3/UL (ref 0.8–3)
LYMPHOCYTES NFR BLD AUTO: 30.3 %
MCH RBC QN AUTO: 33.2 PG (ref 26–34)
MCHC RBC AUTO-ENTMCNC: 34.5 G/DL (ref 32–36)
MCV RBC AUTO: 96 FL (ref 80–100)
MONOCYTES # BLD AUTO: 1.04 X10*3/UL (ref 0.05–0.8)
MONOCYTES NFR BLD AUTO: 11.4 %
NEUTROPHILS # BLD AUTO: 5.19 X10*3/UL (ref 1.6–5.5)
NEUTROPHILS NFR BLD AUTO: 57.2 %
NRBC BLD-RTO: 0 /100 WBCS (ref 0–0)
OXYHGB MFR BLDV: 63.8 % (ref 45–75)
PCO2 BLDV: 43 MM HG (ref 41–51)
PH BLDV: 7.37 PH (ref 7.33–7.43)
PLATELET # BLD AUTO: 234 X10*3/UL (ref 150–450)
PO2 BLDV: 43 MM HG (ref 35–45)
POTASSIUM BLDV-SCNC: 3.8 MMOL/L (ref 3.5–5.3)
POTASSIUM SERPL-SCNC: 3.7 MMOL/L (ref 3.5–5.3)
PROT SERPL-MCNC: 6.6 G/DL (ref 6.4–8.2)
RBC # BLD AUTO: 3.77 X10*6/UL (ref 4–5.2)
RSV RNA RESP QL NAA+PROBE: NOT DETECTED
SAO2 % BLDV: 65 % (ref 45–75)
SARS-COV-2 RNA RESP QL NAA+PROBE: NOT DETECTED
SODIUM BLDV-SCNC: 133 MMOL/L (ref 136–145)
SODIUM SERPL-SCNC: 134 MMOL/L (ref 136–145)
WBC # BLD AUTO: 9.1 X10*3/UL (ref 4.4–11.3)

## 2024-02-05 PROCEDURE — 87637 SARSCOV2&INF A&B&RSV AMP PRB: CPT | Performed by: EMERGENCY MEDICINE

## 2024-02-05 PROCEDURE — G0378 HOSPITAL OBSERVATION PER HR: HCPCS

## 2024-02-05 PROCEDURE — 71045 X-RAY EXAM CHEST 1 VIEW: CPT | Mod: FOREIGN READ | Performed by: RADIOLOGY

## 2024-02-05 PROCEDURE — 70450 CT HEAD/BRAIN W/O DYE: CPT

## 2024-02-05 PROCEDURE — 99223 1ST HOSP IP/OBS HIGH 75: CPT | Performed by: PHYSICIAN ASSISTANT

## 2024-02-05 PROCEDURE — 2500000004 HC RX 250 GENERAL PHARMACY W/ HCPCS (ALT 636 FOR OP/ED): Performed by: EMERGENCY MEDICINE

## 2024-02-05 PROCEDURE — 93005 ELECTROCARDIOGRAM TRACING: CPT

## 2024-02-05 PROCEDURE — 80053 COMPREHEN METABOLIC PANEL: CPT | Performed by: EMERGENCY MEDICINE

## 2024-02-05 PROCEDURE — 87040 BLOOD CULTURE FOR BACTERIA: CPT | Mod: AHULAB | Performed by: EMERGENCY MEDICINE

## 2024-02-05 PROCEDURE — 84132 ASSAY OF SERUM POTASSIUM: CPT | Performed by: EMERGENCY MEDICINE

## 2024-02-05 PROCEDURE — 96361 HYDRATE IV INFUSION ADD-ON: CPT

## 2024-02-05 PROCEDURE — 71045 X-RAY EXAM CHEST 1 VIEW: CPT

## 2024-02-05 PROCEDURE — 36415 COLL VENOUS BLD VENIPUNCTURE: CPT | Performed by: EMERGENCY MEDICINE

## 2024-02-05 PROCEDURE — 85025 COMPLETE CBC W/AUTO DIFF WBC: CPT | Performed by: EMERGENCY MEDICINE

## 2024-02-05 PROCEDURE — 99285 EMERGENCY DEPT VISIT HI MDM: CPT | Mod: 25,27 | Performed by: EMERGENCY MEDICINE

## 2024-02-05 PROCEDURE — 70450 CT HEAD/BRAIN W/O DYE: CPT | Performed by: RADIOLOGY

## 2024-02-05 PROCEDURE — 2500000004 HC RX 250 GENERAL PHARMACY W/ HCPCS (ALT 636 FOR OP/ED): Performed by: INTERNAL MEDICINE

## 2024-02-05 PROCEDURE — 84484 ASSAY OF TROPONIN QUANT: CPT | Performed by: EMERGENCY MEDICINE

## 2024-02-05 RX ORDER — ALBUTEROL SULFATE 90 UG/1
1 AEROSOL, METERED RESPIRATORY (INHALATION) EVERY 4 HOURS PRN
Status: DISCONTINUED | OUTPATIENT
Start: 2024-02-05 | End: 2024-02-05

## 2024-02-05 RX ORDER — ENOXAPARIN SODIUM 100 MG/ML
40 INJECTION SUBCUTANEOUS EVERY 24 HOURS
Status: DISCONTINUED | OUTPATIENT
Start: 2024-02-05 | End: 2024-02-08 | Stop reason: HOSPADM

## 2024-02-05 RX ORDER — ACETAMINOPHEN 650 MG/1
650 SUPPOSITORY RECTAL EVERY 4 HOURS PRN
Status: DISCONTINUED | OUTPATIENT
Start: 2024-02-05 | End: 2024-02-08 | Stop reason: HOSPADM

## 2024-02-05 RX ORDER — ACETAMINOPHEN 160 MG/5ML
650 SOLUTION ORAL EVERY 4 HOURS PRN
Status: DISCONTINUED | OUTPATIENT
Start: 2024-02-05 | End: 2024-02-08 | Stop reason: HOSPADM

## 2024-02-05 RX ORDER — ALBUTEROL SULFATE 0.83 MG/ML
2.5 SOLUTION RESPIRATORY (INHALATION) EVERY 4 HOURS PRN
Status: DISCONTINUED | OUTPATIENT
Start: 2024-02-05 | End: 2024-02-08 | Stop reason: HOSPADM

## 2024-02-05 RX ORDER — POLYETHYLENE GLYCOL 3350 17 G/17G
17 POWDER, FOR SOLUTION ORAL DAILY
Status: DISCONTINUED | OUTPATIENT
Start: 2024-02-05 | End: 2024-02-08 | Stop reason: HOSPADM

## 2024-02-05 RX ORDER — ACETAMINOPHEN 325 MG/1
650 TABLET ORAL EVERY 4 HOURS PRN
Status: DISCONTINUED | OUTPATIENT
Start: 2024-02-05 | End: 2024-02-08 | Stop reason: HOSPADM

## 2024-02-05 RX ORDER — ONDANSETRON HYDROCHLORIDE 2 MG/ML
4 INJECTION, SOLUTION INTRAVENOUS EVERY 8 HOURS PRN
Status: DISCONTINUED | OUTPATIENT
Start: 2024-02-05 | End: 2024-02-08 | Stop reason: HOSPADM

## 2024-02-05 RX ORDER — ONDANSETRON 4 MG/1
4 TABLET, FILM COATED ORAL EVERY 8 HOURS PRN
Status: DISCONTINUED | OUTPATIENT
Start: 2024-02-05 | End: 2024-02-08 | Stop reason: HOSPADM

## 2024-02-05 RX ADMIN — SODIUM CHLORIDE, POTASSIUM CHLORIDE, SODIUM LACTATE AND CALCIUM CHLORIDE 500 ML: 600; 310; 30; 20 INJECTION, SOLUTION INTRAVENOUS at 09:26

## 2024-02-05 RX ADMIN — ENOXAPARIN SODIUM 40 MG: 40 INJECTION SUBCUTANEOUS at 16:52

## 2024-02-05 SDOH — ECONOMIC STABILITY: HOUSING INSECURITY: IN THE LAST 12 MONTHS, WAS THERE A TIME WHEN YOU WERE NOT ABLE TO PAY THE MORTGAGE OR RENT ON TIME?: NO

## 2024-02-05 SDOH — ECONOMIC STABILITY: FOOD INSECURITY: WITHIN THE PAST 12 MONTHS, THE FOOD YOU BOUGHT JUST DIDN'T LAST AND YOU DIDN'T HAVE MONEY TO GET MORE.: NEVER TRUE

## 2024-02-05 SDOH — SOCIAL STABILITY: SOCIAL NETWORK: HOW OFTEN DO YOU GET TOGETHER WITH FRIENDS OR RELATIVES?: MORE THAN THREE TIMES A WEEK

## 2024-02-05 SDOH — ECONOMIC STABILITY: INCOME INSECURITY: IN THE LAST 12 MONTHS, WAS THERE A TIME WHEN YOU WERE NOT ABLE TO PAY THE MORTGAGE OR RENT ON TIME?: NO

## 2024-02-05 SDOH — ECONOMIC STABILITY: TRANSPORTATION INSECURITY
IN THE PAST 12 MONTHS, HAS LACK OF TRANSPORTATION KEPT YOU FROM MEETINGS, WORK, OR FROM GETTING THINGS NEEDED FOR DAILY LIVING?: NO

## 2024-02-05 SDOH — ECONOMIC STABILITY: HOUSING INSECURITY
IN THE LAST 12 MONTHS, WAS THERE A TIME WHEN YOU DID NOT HAVE A STEADY PLACE TO SLEEP OR SLEPT IN A SHELTER (INCLUDING NOW)?: NO

## 2024-02-05 SDOH — ECONOMIC STABILITY: HOUSING INSECURITY

## 2024-02-05 SDOH — SOCIAL STABILITY: SOCIAL NETWORK
DO YOU BELONG TO ANY CLUBS OR ORGANIZATIONS SUCH AS CHURCH GROUPS UNIONS, FRATERNAL OR ATHLETIC GROUPS, OR SCHOOL GROUPS?: YES

## 2024-02-05 SDOH — ECONOMIC STABILITY: FOOD INSECURITY: WITHIN THE PAST 12 MONTHS, YOU WORRIED THAT YOUR FOOD WOULD RUN OUT BEFORE YOU GOT THE MONEY TO BUY MORE.: NEVER TRUE

## 2024-02-05 SDOH — HEALTH STABILITY: MENTAL HEALTH: HOW OFTEN DO YOU HAVE A DRINK CONTAINING ALCOHOL?: NEVER

## 2024-02-05 SDOH — SOCIAL STABILITY: SOCIAL NETWORK: HOW OFTEN DO YOU ATTENT MEETINGS OF THE CLUB OR ORGANIZATION YOU BELONG TO?: MORE THAN 4 TIMES PER YEAR

## 2024-02-05 SDOH — SOCIAL STABILITY: SOCIAL INSECURITY: WITHIN THE LAST YEAR, HAVE YOU BEEN HUMILIATED OR EMOTIONALLY ABUSED IN OTHER WAYS BY YOUR PARTNER OR EX-PARTNER?: NO

## 2024-02-05 SDOH — SOCIAL STABILITY: SOCIAL INSECURITY: WITHIN THE LAST YEAR, HAVE YOU BEEN AFRAID OF YOUR PARTNER OR EX-PARTNER?: NO

## 2024-02-05 SDOH — SOCIAL STABILITY: SOCIAL INSECURITY
WITHIN THE LAST YEAR, HAVE YOU BEEN KICKED, HIT, SLAPPED, OR OTHERWISE PHYSICALLY HURT BY YOUR PARTNER OR EX-PARTNER?: NO

## 2024-02-05 SDOH — HEALTH STABILITY: MENTAL HEALTH
STRESS IS WHEN SOMEONE FEELS TENSE, NERVOUS, ANXIOUS, OR CAN'T SLEEP AT NIGHT BECAUSE THEIR MIND IS TROUBLED. HOW STRESSED ARE YOU?: NOT AT ALL

## 2024-02-05 SDOH — HEALTH STABILITY: MENTAL HEALTH: HOW OFTEN DO YOU HAVE 6 OR MORE DRINKS ON ONE OCCASION?: NEVER

## 2024-02-05 SDOH — ECONOMIC STABILITY: INCOME INSECURITY: IN THE PAST 12 MONTHS, HAS THE ELECTRIC, GAS, OIL, OR WATER COMPANY THREATENED TO SHUT OFF SERVICE IN YOUR HOME?: NO

## 2024-02-05 SDOH — HEALTH STABILITY: PHYSICAL HEALTH: ON AVERAGE, HOW MANY MINUTES DO YOU ENGAGE IN EXERCISE AT THIS LEVEL?: 30 MIN

## 2024-02-05 SDOH — SOCIAL STABILITY: SOCIAL INSECURITY
WITHIN THE LAST YEAR, HAVE TO BEEN RAPED OR FORCED TO HAVE ANY KIND OF SEXUAL ACTIVITY BY YOUR PARTNER OR EX-PARTNER?: NO

## 2024-02-05 SDOH — ECONOMIC STABILITY: FOOD INSECURITY: WITHIN THE PAST 12 MONTHS, YOU WORRIED THAT YOUR FOOD WOULD RUN OUT BEFORE YOU GOT MONEY TO BUY MORE.: NEVER TRUE

## 2024-02-05 SDOH — ECONOMIC STABILITY: GENERAL

## 2024-02-05 SDOH — ECONOMIC STABILITY: HOUSING INSECURITY: IN THE LAST 12 MONTHS, HOW MANY PLACES HAVE YOU LIVED?: 2

## 2024-02-05 SDOH — SOCIAL STABILITY: SOCIAL NETWORK
IN A TYPICAL WEEK, HOW MANY TIMES DO YOU TALK ON THE PHONE WITH FAMILY, FRIENDS, OR NEIGHBORS?: MORE THAN THREE TIMES A WEEK

## 2024-02-05 SDOH — ECONOMIC STABILITY: HOUSING INSECURITY: IN THE LAST 12 MONTHS, HOW MANY PLACES HAVE YOU LIVED?: 1

## 2024-02-05 SDOH — SOCIAL STABILITY: SOCIAL NETWORK: ARE YOU MARRIED, WIDOWED, DIVORCED, SEPARATED, NEVER MARRIED, OR LIVING WITH A PARTNER?: WIDOWED

## 2024-02-05 SDOH — ECONOMIC STABILITY: FOOD INSECURITY: WITHIN THE PAST 12 MONTHS, THE FOOD YOU BOUGHT JUST DIDN’T LAST AND YOU DIDN’T HAVE MONEY TO GET MORE.: NEVER TRUE

## 2024-02-05 SDOH — SOCIAL STABILITY: SOCIAL INSECURITY: HAVE YOU HAD THOUGHTS OF HARMING ANYONE ELSE?: NO

## 2024-02-05 SDOH — SOCIAL STABILITY: SOCIAL INSECURITY: WERE YOU ABLE TO COMPLETE ALL THE BEHAVIORAL HEALTH SCREENINGS?: YES

## 2024-02-05 SDOH — ECONOMIC STABILITY: TRANSPORTATION INSECURITY: IN THE PAST 12 MONTHS, HAS LACK OF TRANSPORTATION KEPT YOU FROM MEDICAL APPOINTMENTS OR FROM GETTING MEDICATIONS?: NO

## 2024-02-05 SDOH — ECONOMIC STABILITY: TRANSPORTATION INSECURITY

## 2024-02-05 SDOH — HEALTH STABILITY: MENTAL HEALTH: HOW MANY STANDARD DRINKS CONTAINING ALCOHOL DO YOU HAVE ON A TYPICAL DAY?: PATIENT DOES NOT DRINK

## 2024-02-05 SDOH — SOCIAL STABILITY: SOCIAL NETWORK: HOW OFTEN DO YOU ATTEND CHURCH OR RELIGIOUS SERVICES?: MORE THAN 4 TIMES PER YEAR

## 2024-02-05 SDOH — ECONOMIC STABILITY: INCOME INSECURITY: HOW HARD IS IT FOR YOU TO PAY FOR THE VERY BASICS LIKE FOOD, HOUSING, MEDICAL CARE, AND HEATING?: NOT HARD AT ALL

## 2024-02-05 SDOH — ECONOMIC STABILITY: FOOD INSECURITY

## 2024-02-05 SDOH — ECONOMIC STABILITY: TRANSPORTATION INSECURITY
IN THE PAST 12 MONTHS, HAS THE LACK OF TRANSPORTATION KEPT YOU FROM MEDICAL APPOINTMENTS OR FROM GETTING MEDICATIONS?: NO

## 2024-02-05 SDOH — HEALTH STABILITY: PHYSICAL HEALTH: ON AVERAGE, HOW MANY DAYS PER WEEK DO YOU ENGAGE IN MODERATE TO STRENUOUS EXERCISE (LIKE A BRISK WALK)?: 2 DAYS

## 2024-02-05 SDOH — ECONOMIC STABILITY: HOUSING INSECURITY: IN THE PAST 12 MONTHS HAS THE ELECTRIC, GAS, OIL, OR WATER COMPANY THREATENED TO SHUT OFF SERVICES IN YOUR HOME?: NO

## 2024-02-05 ASSESSMENT — ACTIVITIES OF DAILY LIVING (ADL)
FEEDING YOURSELF: INDEPENDENT
WALKS IN HOME: NEEDS ASSISTANCE
PATIENT'S MEMORY ADEQUATE TO SAFELY COMPLETE DAILY ACTIVITIES?: YES
DRESSING YOURSELF: INDEPENDENT
HEARING - RIGHT EAR: FUNCTIONAL
ADEQUATE_TO_COMPLETE_ADL: YES
BATHING: INDEPENDENT
TOILETING: INDEPENDENT
LACK_OF_TRANSPORTATION: NO
LACK_OF_TRANSPORTATION: NO
JUDGMENT_ADEQUATE_SAFELY_COMPLETE_DAILY_ACTIVITIES: YES
HEARING - LEFT EAR: FUNCTIONAL
GROOMING: INDEPENDENT

## 2024-02-05 ASSESSMENT — COGNITIVE AND FUNCTIONAL STATUS - GENERAL
MOBILITY SCORE: 18
DRESSING REGULAR UPPER BODY CLOTHING: A LOT
DRESSING REGULAR LOWER BODY CLOTHING: A LOT
DAILY ACTIVITIY SCORE: 15
DAILY ACTIVITIY SCORE: 15
CLIMB 3 TO 5 STEPS WITH RAILING: A LOT
PATIENT BASELINE BEDBOUND: NO
TURNING FROM BACK TO SIDE WHILE IN FLAT BAD: A LITTLE
PERSONAL GROOMING: A LITTLE
STANDING UP FROM CHAIR USING ARMS: A LITTLE
PERSONAL GROOMING: A LITTLE
STANDING UP FROM CHAIR USING ARMS: A LITTLE
HELP NEEDED FOR BATHING: A LOT
TOILETING: A LOT
WALKING IN HOSPITAL ROOM: A LITTLE
MOVING TO AND FROM BED TO CHAIR: A LITTLE
MOVING TO AND FROM BED TO CHAIR: A LITTLE
DRESSING REGULAR UPPER BODY CLOTHING: A LOT
CLIMB 3 TO 5 STEPS WITH RAILING: A LOT
HELP NEEDED FOR BATHING: A LOT
TURNING FROM BACK TO SIDE WHILE IN FLAT BAD: A LITTLE
TOILETING: A LOT
WALKING IN HOSPITAL ROOM: A LITTLE
DRESSING REGULAR LOWER BODY CLOTHING: A LOT
MOBILITY SCORE: 18

## 2024-02-05 ASSESSMENT — LIFESTYLE VARIABLES
SKIP TO QUESTIONS 9-10: 1
AUDIT-C TOTAL SCORE: 0

## 2024-02-05 ASSESSMENT — SOCIAL DETERMINANTS OF HEALTH (SDOH): IN THE PAST 12 MONTHS, HAS THE ELECTRIC, GAS, OIL, OR WATER COMPANY THREATENED TO SHUT OFF SERVICE IN YOUR HOME?: NO

## 2024-02-05 ASSESSMENT — PATIENT HEALTH QUESTIONNAIRE - PHQ9
2. FEELING DOWN, DEPRESSED OR HOPELESS: NOT AT ALL
1. LITTLE INTEREST OR PLEASURE IN DOING THINGS: NOT AT ALL
SUM OF ALL RESPONSES TO PHQ9 QUESTIONS 1 & 2: 0

## 2024-02-05 ASSESSMENT — COLUMBIA-SUICIDE SEVERITY RATING SCALE - C-SSRS
1. IN THE PAST MONTH, HAVE YOU WISHED YOU WERE DEAD OR WISHED YOU COULD GO TO SLEEP AND NOT WAKE UP?: NO
6. HAVE YOU EVER DONE ANYTHING, STARTED TO DO ANYTHING, OR PREPARED TO DO ANYTHING TO END YOUR LIFE?: NO
2. HAVE YOU ACTUALLY HAD ANY THOUGHTS OF KILLING YOURSELF?: NO

## 2024-02-05 ASSESSMENT — PAIN - FUNCTIONAL ASSESSMENT
PAIN_FUNCTIONAL_ASSESSMENT: 0-10

## 2024-02-05 ASSESSMENT — PAIN DESCRIPTION - PROGRESSION: CLINICAL_PROGRESSION: NOT CHANGED

## 2024-02-05 ASSESSMENT — PAIN SCALES - GENERAL
PAINLEVEL_OUTOF10: 0 - NO PAIN

## 2024-02-05 NOTE — PROGRESS NOTES
Laura ALEXANDER in [place]      02/05/24 0912   Current Planned Discharge Disposition   Current Planned Discharge Disposition Inter

## 2024-02-05 NOTE — ED TRIAGE NOTES
Patient arrived to ED with c/o weakness and dizziness after having a near-syncopal episode this morning at her nursing facility. She told EMS that she had a fall last night and hit her head, but did not notify the staff about the fall until this morning. Negative blood thinners. Negative LOC. Patient a & o x 4 in triage.   Patient was bradicardic when when EMS arrived - 45 bpm.  EMS gave 1 mg atropine, 4 mg zofran, and started fluids PTA.

## 2024-02-05 NOTE — PROGRESS NOTES
02/05/24 0912   [facility] Disability Status   Are you deaf or do you have serious difficulty hearing? N   Are you blind or do you have serious difficulty seeing, even when wearing glasses? N   Because of a physical, mental, or emotional condition, do you have serious difficulty concentrating, remembering, or making decisions? (5 years old or older) Y   Do you have serious difficulty walking or climbing stairs? Y   Do you have serious difficulty dressing or bathing? Y   Because of a physical, mental, or emotional condition, do you have serious difficulty doing errands alone such as visiting the doctor? Y

## 2024-02-05 NOTE — PROGRESS NOTES
Pharmacy Medication History Review    Grace Ahn is a 89 y.o. female admitted for Syncope and collapse. Pharmacy reviewed the patient's [id number] medications and allergies for accuracy.    The list below reflectives the updated PTA list. Please review each medication in order reconciliation for additional clarification and justification.  Prior to Admission medications    Medication Sig Start Date End Date Taking? Authorizing Provider                                                                                The list below reflectives the updated allergy list. Please review each documented allergy for additional clarification and justification.  Allergies  Reviewed by Megan Slater RN on 2/5/2024        Severity Reactions Comments    Bacitracin-polymyxin B Not Specified Unknown     Latex Not Specified Other     Penicillins Not Specified Hives     Adhesive Tape-silicones Low Rash     Bacitracin Low Rash     Cephalosporins Low Hives, Rash     Chlorine Low Rash     Ciprofloxacin Low Rash, Unknown     Doxycycline Low Rash     Neomycin Low Rash     Omeprazole Low Rash     Pantoprazole Low Rash     Sulfamethoxazole Low Rash             Below are additional concerns with the patient's PTA list.  Prior to Admission Medications   Prescriptions Last Dose Informant   Premarin vaginal cream 2/4/2024    Sig: APPLY A PEA SIZED AMOUNT TO VAGINA DAILY AT BEDTIME   Patient taking differently: Insert into the vagina once daily.   acetaminophen (Tylenol) 325 mg tablet     Sig: Take 2 tablets (650 mg) by mouth every 8 hours if needed for moderate pain (4 - 6).   albuterol (ProAir RespiClick) 90 mcg/actuation aerosol powdr breath activated inhaler     Sig: Inhale 2 puffs every 6 hours if needed for wheezing.   biotin 1 mg tablet 2/4/2024    Sig: Take 1 tablet (1,000 mcg) by mouth twice a day.   calcium carbonate 600 mg calcium (1,500 mg) tablet 2/4/2024    Sig: Take 600 mg by mouth twice a day.   cholecalciferol  (Vitamin D-3) 10 MCG (400 UNIT) tablet 2/4/2024    Sig: Take 1 tablet (10 mcg) by mouth once daily.   denosumab (Prolia) 60 mg/mL syringe 11/9/2023    Sig: Inject 1 mL (60 mg total) under the skin every 6 months.   mv,Ca,min-folic acid-vit K1 (One-A-Day Women's 50 Plus) 400-20 mcg tablet 2/4/2024    Sig: Take 1 tablet by mouth once daily.      Facility-Administered Medications: None      Per patient.    Leta Liu, [email or web address]

## 2024-02-05 NOTE — ED PROVIDER NOTES
HPI   Chief Complaint   Patient presents with    Syncope       HPI: []  89-year-old white female with a history of some dementia delusions and longstanding vasovagal syncope comes in with syncopal episode and generalized weakness.  Family states that yesterday she had a low-grade fever for which she was being managed conservatively.  Last night patient answered the door when the aide knocked the door and she got up she had a vasovagal syncopal event in the ED for concern she called 911.  The patient has no particular complaints.  She just feels weak generally.  She denies any chest pain pressure heaviness fever chills nausea vomit diarrhea cough congestion incontinence seizures.  Currently back to baseline mental status.    Past history: Delusions, dementia, vasovagal syncope  Social: Patient denies current tobacco alcohol drug abuse.  REVIEW OF SYSTEMS:    GENERAL.: No weight loss, fatigue, anorexia, insomnia, fever.  Positive generalized weakness    EYES: No vision loss, double vision, drainage, eye pain.    ENT: No pharyngitis, dry mouth.    CARDIOPULMONARY: No chest pain, palpitations, positive for syncope, near syncope. No shortness of breath, cough, hemoptysis.    GI: No abdominal pain, change in bowel habits, melena, hematemesis, hematochezia, nausea, vomiting, diarrhea.    : No discharge, dysuria, frequency, urgency, hematuria.    MS: No limb pain, joint pain, joint swelling.    SKIN: No rashes.    PSYCH: No depression, anxiety, suicidality, homicidality.    Review of systems is otherwise negative unless stated above or in history of present illness.  Social history, family history, allergies reviewed.  PHYSICAL EXAM:    GENERAL: Vitals noted, no distress. Alert and oriented  x 1 at her baseline. Non-toxic.      EENT: TMs clear. Posterior oropharynx unremarkable. No meningismus. No LAD.     NECK: Supple. Nontender. No midline tenderness.     CARDIAC: Regular, rate, rhythm. No murmurs rubs or gallops. No  JVD    PULMONARY: Lungs clear bilaterally with good aeration. No wheezes rales or rhonchi. No respiratory distress.  No tachypnea stridor or retractions able to speak in full sentences    ABDOMEN: Soft, nonsurgical. Nontender. No peritoneal signs. Normoactive bowel sounds. No pulsatile masses.  Negative CVA tenderness    EXTREMITIES: No peripheral edema. Negative Homans bilaterally, no cords.  2+ bounding pulses well-perfused.    SKIN: No rash. Intact.     NEURO: No focal neurologic deficits, NIH score of 0. Cranial nerves normal as tested from II through XII.     MEDICAL DECISION MAKING:  EKG on my interpretation shows normal sinus rhythm left axis deviation rate mid 80s with no acute ischemic changes.    CBC with differential shows no leukocytosis chemistries are unremarkable troponin/delta troponin around 22/23 remains flat, UA pending, CT head negative chest x-ray negative influenza COVID RSV negative.    Treatment in ED: IV established, given IV fluids.    Course: Patient remained stable hemodynamic.    Consult: Social work consulted.    Impression: #1 vasovagal syncope, #2 generalized weakness    Plan/MDM: Elderly female history of longstanding physical syncope with multiple negative workups comes in with syncopal episode and generalized weakness and fatigue.  Patient is by herself.  Low suspicion for STEMI NSTEMI septic shock sepsis dehydration stroke or TIA.  Patient will be hospitalized for further care.                          [place] Coma Scale Score: 15                  Patient History   Past Medical History:   Diagnosis Date    Acute cystitis without hematuria 02/10/2020    Acute cystitis without hematuria    Adverse effect of unspecified drugs, medicaments and biological substances, initial encounter 09/09/2021    Adverse reaction to drug, initial encounter    Delusional disorders (CMS/[facility]) 11/02/2021    Delusions    Laceration without foreign body, left lower leg, initial encounter 03/15/2020    Skin  tear of left lower leg without complication    Other fracture of left patella, initial encounter for closed fracture 2020    Other fracture of left patella, initial encounter for closed fracture    Personal history of other specified conditions 2020    History of nocturia    Personal history of urinary (tract) infections 2021    History of urinary tract infection    Unspecified fracture of left patella, initial encounter for closed fracture 2020    Left patella fracture     Past Surgical History:   Procedure Laterality Date    MR HEAD ANGIO WO IV CONTRAST  2022    MR HEAD ANGIO WO IV CONTRAST 2022 [facility] CLINICAL LEGACY    MR NECK ANGIO WO IV CONTRAST  2022    MR NECK ANGIO WO IV CONTRAST 2022 [facility] CLINICAL LEGACY    OTHER SURGICAL HISTORY  12/10/2019    Tonsillectomy with adenoidectomy    OTHER SURGICAL HISTORY  12/10/2019    Hysterectomy total abdominal with removal of both ovaries    OTHER SURGICAL HISTORY  12/10/2019    Root canal procedure    OTHER SURGICAL HISTORY  12/10/2019    Breast biopsy    OTHER SURGICAL HISTORY  12/10/2019     section    OTHER SURGICAL HISTORY  2021    Cataract surgery    OTHER SURGICAL HISTORY  2022    Carpal tunnel surgery    OTHER SURGICAL HISTORY  2022    Varicose vein sclerotherapy    OTHER SURGICAL HISTORY  2022    Excision of squamous cell carcinoma    OTHER SURGICAL HISTORY  2022    Mohs surgery     No family history on file.  Social History     Tobacco Use    Smoking status: Never    Smokeless tobacco: Never   Substance Use Topics    Alcohol use: Not on file    Drug use: Not on file       Physical Exam   ED Triage Vitals [24 0841]   Temperature Heart Rate Respirations BP   36.8 °C (98.2 °F) 75 16 133/53      Pulse Ox Temp Source Heart Rate Source Patient Position   95 % Temporal -- --      BP Location FiO2 (%)     -- --       Physical Exam    ED Course & [facility]   ED Course as of 24 [id number]    Mon Feb 05, 2024   1536 Patient EKG on my interpretation shows normal sinus rhythm left  axis deviation, rate mid 80s with no acute ischemic changes.  Her CBC BetaVent chemistries unremarkable troponin and delta troponin around 23/22 remains flat, chest x-ray negative CT head negative labs reassuring UA pending influenza RSV COVID-negative patient was given IV fluids and will be hospitalized for further care. [MT]      ED Course User Index  [MT] Robyn Pulido MD         Diagnoses as of 02/05/24 1537   Syncope and collapse       Medical Decision Making      Procedure  Procedures     Robyn Pulido MD  02/05/24 4190

## 2024-02-05 NOTE — PROGRESS NOTES
02/05/24 1353   Physical Activity   On average, how many days per week do you engage in moderate to strenuous exercise (like a brisk walk)? 2 days  [place] & activities @ [place])   On average, how many minutes do you engage in exercise at this level? 30 min   Financial Resource Strain   How hard is it for you to pay for the very basics like food, housing, medical care, and heating? Not hard   Housing Stability   In the last 12 months, was there a time when you were not able to pay the mortgage or rent on time? N   In the last 12 months, how many places have you lived? 2   In the last 12 months, was there a time when you did not have a steady place to sleep or slept in a shelter (including now)? N   Transportation Needs   In the past 12 months, has lack of transportation kept you from medical appointments or from getting medications? no   In the past 12 months, has lack of transportation kept you from meetings, work, or from getting things needed for daily living? No   Food Insecurity   Within the past 12 months, you worried that your food would run out before you got the money to buy more. Never true   Within the past 12 months, the food you bought just didn't last and you didn't have money to get more. Never true   Stress   Do you feel stress - tense, restless, nervous, or anxious, or unable to sleep at night because your mind is troubled all the time - these days? Not at all   Social Connections   In a typical week, how many times do you talk on the phone with family, friends, or neighbors? More than 3   How often do you get together with friends or relatives? More than 3   How often do you attend Shinto or Adventist services? More than 4  ([place] comes to [place] twice a month)   Do you belong to any clubs or organizations such as Shinto groups, unions, fraternal or athletic groups, or school groups? Yes   How often do you attend meetings of the clubs or organizations you belong to? More than 4   Are you  , , , , never , or living with a partner?    Intimate Partner Violence   Within the last year, have you been afraid of your partner or ex-partner? No   Within the last year, have you been humiliated or emotionally abused in other ways by your partner or ex-partner? No   Within the last year, have you been kicked, hit, slapped, or otherwise physically hurt by your partner or ex-partner? No   Within the last year, have you been raped or forced to have any kind of sexual activity by your partner or ex-partner? No   Alcohol Use   Q1: How often do you have a drink containing alcohol? Never   Q2: How many drinks containing alcohol do you have on a typical day when you are drinking? None   Q3: How often do you have six or more drinks on one occasion? Never   Utilities   In the past 12 months has the electric, gas, oil, or water company threatened to shut off services in your home? No

## 2024-02-05 NOTE — PROGRESS NOTES
Transitional Care Coordination Progress Note:  Plan per Medical/Surgical team: treatment of syncope & fall with IV fluids, CTSCAN head pending  Status: observation  Payor source: medicare A/B, Bon  Discharge disposition: [facility] [place], denies needing assistance  Good candidate for HEALTHY @ HOME program @ [place]  Potential Barriers: UA pending  ADOD: 2/6/2024  KATHRINE Contreras RN, BSN Transitional Care Coordinator ED# [phone]      02/05/24 0913   Discharge Planning   Living Arrangements Alone   Support Systems Children   Assistance Needed CTscan of head pending, IV fluids, UA pending   Type of Residence Assisted living   Number of Stairs to Enter Residence 0   Number of Stairs Within Residence 0   Do you have animals or pets at home? No   Care Facility Name [facility] [place]   Home or Post Acute Services Post acute facilities (Rehab/SNF/etc)   Type of Post Acute Facility Services Assisted living   Patient expects to be discharged to: [facility] [place]   Does the patient need discharge transport arranged? Yes   RoundTrip coordination needed? Yes   Has discharge transport been arranged? No   Financial Resource Strain   How hard is it for you to pay for the very basics like food, housing, medical care, and heating? Not hard   Housing Stability   In the last 12 months, was there a time when you were not able to pay the mortgage or rent on time? N   In the last 12 months, how many places have you lived? 2   In the last 12 months, was there a time when you did not have a steady place to sleep or slept in a shelter (including now)? N   Transportation Needs   In the past 12 months, has lack of transportation kept you from medical appointments or from getting medications? no   In the past 12 months, has lack of transportation kept you from meetings, work, or from getting things needed for daily living? No   Patient Choice   Provider Choice list and CMS website (https://medicare.gov/care-compare#search) for  post-acute Quality and Resource Measure Data were provided and reviewed with: Patient;Family   Patient / Family choosing to utilize agency / facility established prior to hospitalization Yes

## 2024-02-05 NOTE — H&P
History Of Present Illness  Grace Ahn is a 89 y.o. female presenting with syncope and cough.  This patient has an extensive history of vasovagal syncope with multiple episodes.  Dr. Gustafson is her son-in-law and he states she has had extensive workups including cardiac workup, echoes, neurology, and autonomic specialists.  Patient is supposed to drink more fluids but typically does not drink enough fluids as recommended by the autonomic specialist.  Patient walks independently and lives in independent living.  She presents today for syncopal episode this morning.  Apparently she was in bed when the  came and knocked on the door.  She got up quickly and went to the door.  She did open the door to let the  in but then the next thing she remembers is when the EMS responders were in her apartment.  She apparently had a syncopal episode and the  called 911.  Patient states she had no symptoms before or after the episode.  She denies any chest pain or shortness of breath or dizziness or weakness etc.  She has had a semiproductive cough for the last 2 to 3 days, with a hoarse voice but no other symptoms.  She denies any injury from the collapse today she denies head injury but was unconscious for a few minutes.  Her daughter and son-in-law are concerned that she may need additional assistance at the home health aide etc.  Patient does not want to change from independent living to assisted living.  She does have some ongoing left knee pain that is chronic and unchanged.  It is intermittent but she does have it currently.    In the emergency department the patient was given 500 cc of lactated ringer.    Her metabolic panel showed a glucose of 131 and a sodium of 134   ,the rest of the metabolic panel was within normal limits.  CBC was unremarkable with a white count H&H, and platelets within normal limits.  Her red blood cells were 3.77 but this is consistent with previous studies.  She  was negative for flu and COVID.  I added RSV and that test is pending.  Blood cultures were drawn and are pending.  CT of the head showed findings consistent with small vessel ischemic disease but no acute process.  Chest x-ray showed findings of's COPD but no acute process.  EKG showed normal sinus rhythm with a heart rate of 76 and a left anterior fascicular block.  There is no significant change when compared with EKG from 1 year ago.  There are no signs of ST elevation or depression on the EKG today.  Urine was ordered from the ED and the nurse was going to straight cath the patient as she has not put on any urine but Dr. Gustafson does not want her straight cathed at this time.  Troponin was 23, second trop pending.    Past medical history: Vasovagal syncope, mild cognitive impairment, fractured patella in 2019, osteoarthritis of the knee, memory impairment, MVP, struct of sleep apnea, hyperlipidemia, osteoporosis, hypertension, degenerative disc disease, remote SVT noted in 2011, wheezing on recent diagnosis in October    Medications: Reviewed in the EMR    Allergies: Patient has an extensive list, reviewed in the EMR    Social history: Denies tobacco or alcohol use, lives alone in an independent living apartment         Past Medical History:   Diagnosis Date    Acute cystitis without hematuria 02/10/2020    Acute cystitis without hematuria    Adverse effect of unspecified drugs, medicaments and biological substances, initial encounter 09/09/2021    Adverse reaction to drug, initial encounter    Delusional disorders (CMS/[facility]) 11/02/2021    Delusions    Laceration without foreign body, left lower leg, initial encounter 03/15/2020    Skin tear of left lower leg without complication    Other fracture of left patella, initial encounter for closed fracture 02/16/2020    Other fracture of left patella, initial encounter for closed fracture    Personal history of other specified conditions 02/24/2020    History of  nocturia    Personal history of urinary (tract) infections 2021    History of urinary tract infection    Unspecified fracture of left patella, initial encounter for closed fracture 2020    Left patella fracture     Past Surgical History:   Procedure Laterality Date    MR HEAD ANGIO WO IV CONTRAST  2022    MR HEAD ANGIO WO IV CONTRAST 2022 [facility] LEGAC[facility]    MR NECK ANGIO WO IV CONTRAST  2022    MR NECK ANGIO WO IV CONTRAST 2022 [facility]    OTHER SURGICAL HISTORY  12/10/2019    Tonsillectomy with adenoidectomy    OTHER SURGICAL HISTORY  12/10/2019    Hysterectomy total abdominal with removal of both ovaries    OTHER SURGICAL HISTORY  12/10/2019    Root canal procedure    OTHER SURGICAL HISTORY  12/10/2019    Breast biopsy    OTHER SURGICAL HISTORY  12/10/2019     section    OTHER SURGICAL HISTORY  2021    Cataract surgery    OTHER SURGICAL HISTORY  2022    Carpal tunnel surgery    OTHER SURGICAL HISTORY  2022    Varicose vein sclerotherapy    OTHER SURGICAL HISTORY  2022    Excision of squamous cell carcinoma    OTHER SURGICAL HISTORY  2022    Mohs surgery     Social History     Tobacco Use    Smoking status: Never    Smokeless tobacco: Never        Family History  No family history on file.     Allergies  Bacitracin-polymyxin b, Latex, Penicillins, Adhesive tape-silicones, Bacitracin, Cephalosporins, Chlorine, Ciprofloxacin, Doxycycline, Neomycin, Omeprazole, Pantoprazole, and Sulfamethoxazole    Review of Systems  Patient denies chest pain, shortness of breath, nausea, vomiting, fever, chills, diarrhea, constipation,  vision changes, rashes, dysuria, paresthesias, vertigo, headache,  or any other complaints at this time. A complete review of systems was done, and is as stated in the history of present illness, is otherwise negative or not pertinent to the complaint.    Physical Exam  Physical exam: Vital signs and nurses notes  were reviewed.    General:  no acute distress. Alert and oriented  x 3.  Patient was sitting up and eating, spoke easily in full sentences but does have an intermittent dry cough.    Head: atraumatic and normocephalic    Eyes: Pupils equal round reactive to light, EOMs are intact, conjunctivae is not injected.    Oropharynx: No erythema or exudate noted, no trismus or drooling, buccal mucosa is moist.    Ears:  normal external exam, no swelling or erythema,     Nasal: normal external exam,     Neck: Supple, full range of motion,     Cardiac: Regular rate and rhythm. No murmurs noted.     Pulmonary: Lungs clear bilaterally with good aeration. No adventitious breath sounds. No wheezes rales or rhonchi. No accessory muscle use no retraction noted.    Abdomen: Soft,  Nontender. No guarding, rigidity, or distention. Normoactive bowel sounds. No pulsatile masses, no bruits.     Extremities:  Full range of motion.  No edema    Skin: No rash seen. Skin is warm and dry     Neuro: Patient is alert and oriented x3. Speech is clear. There is no asymmetry with facial grimaces, and no tongue deviation. Patient moves all extremities independently. Sensation is intact. No obvious neuro deficits are noted.     Last Recorded Vitals  /55   Pulse 60   Temp 36.8 °C (98.2 °F) (Temporal)   Resp 16   Wt 53.5 kg (118 lb)   SpO2 96%     Relevant Results  Scheduled medications    Continuous medications    PRN medications      Results for orders placed or performed during the hospital encounter of 02/05/24 (from the past 24 hour(s))   ECG 12 lead   Result Value Ref Range    Ventricular Rate 76 BPM    Atrial Rate 76 BPM    NC Interval 168 ms    QRS Duration 106 ms    QT Interval 426 ms    QTC Calculation(Bazett) 479 ms    P Axis 74 degrees    R Axis -68 degrees    T Axis 68 degrees    QRS Count 13 beats    Q Onset 224 ms    P Onset 140 ms    P Offset 200 ms    T Offset 437 ms    QTC Fredericia 460 ms   CBC with Differential   Result  Value Ref Range    WBC 9.1 4.4 - 11.3 x10*3/uL    nRBC 0.0 0.0 - 0.0 /100 WBCs    RBC 3.77 (L) 4.00 - 5.20 x10*6/uL    Hemoglobin 12.5 12.0 - 16.0 g/dL    Hematocrit 36.2 36.0 - 46.0 %    MCV 96 80 - 100 fL    MCH 33.2 26.0 - 34.0 pg    MCHC 34.5 32.0 - 36.0 g/dL    RDW 13.2 11.5 - 14.5 %    Platelets 234 150 - 450 x10*3/uL    Neutrophils % 57.2 40.0 - 80.0 %    Immature Granulocytes %, Automated 0.3 0.0 - 0.9 %    Lymphocytes % 30.3 13.0 - 44.0 %    Monocytes % 11.4 2.0 - 10.0 %    Eosinophils % 0.5 0.0 - 6.0 %    Basophils % 0.3 0.0 - 2.0 %    Neutrophils Absolute 5.19 1.60 - 5.50 x10*3/uL    Immature Granulocytes Absolute, Automated 0.03 0.00 - 0.50 x10*3/uL    Lymphocytes Absolute 2.76 0.80 - 3.00 x10*3/uL    Monocytes Absolute 1.04 (H) 0.05 - 0.80 x10*3/uL    Eosinophils Absolute 0.05 0.00 - 0.40 x10*3/uL    Basophils Absolute 0.03 0.00 - 0.10 x10*3/uL   Comprehensive Metabolic Panel   Result Value Ref Range    Glucose 131 (H) 74 - 99 mg/dL    Sodium 134 (L) 136 - 145 mmol/L    Potassium 3.7 3.5 - 5.3 mmol/L    Chloride 101 98 - 107 mmol/L    Bicarbonate 23 21 - 32 mmol/L    Anion Gap 14 10 - 20 mmol/L    Urea Nitrogen 22 6 - 23 mg/dL    Creatinine 0.85 0.50 - 1.05 mg/dL    eGFR 64 >60 mL/min/1.73m*2    Calcium 9.0 8.6 - 10.3 mg/dL    Albumin 3.6 3.4 - 5.0 g/dL    Alkaline Phosphatase 58 33 - 136 U/L    Total Protein 6.6 6.4 - 8.2 g/dL    AST 18 9 - 39 U/L    Bilirubin, Total 0.5 0.0 - 1.2 mg/dL    ALT 9 7 - 45 U/L   Sars-CoV-2 and Influenza A/B PCR   Result Value Ref Range    Flu A Result Not Detected Not Detected    Flu B Result Not Detected Not Detected    Coronavirus 2019, PCR Not Detected Not Detected   Troponin I, High Sensitivity   Result Value Ref Range    Troponin I, High Sensitivity 23 (H) 0 - 13 ng/L   Blood Gas Venous Full Panel   Result Value Ref Range    POCT pH, Venous 7.37 7.33 - 7.43 pH    POCT pCO2, Venous 43 41 - 51 mm Hg    POCT pO2, Venous 43 35 - 45 mm Hg    POCT SO2, Venous 65 45 - 75 %     POCT Oxy Hemoglobin, Venous 63.8 45.0 - 75.0 %    POCT Hematocrit Calculated, Venous 36.0 36.0 - 46.0 %    POCT Sodium, Venous 133 (L) 136 - 145 mmol/L    POCT Potassium, Venous 3.8 3.5 - 5.3 mmol/L    POCT Chloride, Venous 102 98 - 107 mmol/L    POCT Ionized Calicum, Venous 1.19 1.10 - 1.33 mmol/L    POCT Glucose, Venous 117 (H) 74 - 99 mg/dL    POCT Lactate, Venous 1.3 0.4 - 2.0 mmol/L    POCT Base Excess, Venous -0.5 -2.0 - 3.0 mmol/L    POCT HCO3 Calculated, Venous 24.9 22.0 - 26.0 mmol/L    POCT Hemoglobin, Venous 12.1 12.0 - 16.0 g/dL    POCT Anion Gap, Venous 10.0 10.0 - 25.0 mmol/L    Patient Temperature 37.0 degrees Celsius    FiO2 22 %   Blood Culture    Specimen: Peripheral Venipuncture; Blood culture   Result Value Ref Range    Blood Culture Loaded on Instrument - Culture in progress    Blood Culture    Specimen: Peripheral Venipuncture; Blood culture   Result Value Ref Range    Blood Culture Loaded on Instrument - Culture in progress      CT head wo IV contrast   Final Result   1. Diffuse volume loss and periventricular white matter changes, most   consistent with small vessel ischemic disease.   2. No evidence of acute cortical infarct or intracranial hemorrhage.             MACRO:   None        Signed by: Jean Claude Silva 2/5/2024 12:48 PM   Dictation workstation:   [id number]      XR chest 1 view   Final Result   COPD without active pulmonary disease.   Signed by Kyle French MD             Assessment/Plan   Principal Problem:    Syncope and collapse  Active Problems:    Acute cough      Plan: Admission orders placed by Dr. Maya include albuterol as needed, regular diet, Lovenox, MiraLAX, OT and PT consults.  Patient has a UA and troponin pending at this time, monitor results.  I added a social work consult, telemetry, and oxygen as needed.  Repeat labs in the morning         Cristina Pillai PA-C

## 2024-02-06 LAB
ANION GAP SERPL CALC-SCNC: 10 MMOL/L (ref 10–20)
BUN SERPL-MCNC: 20 MG/DL (ref 6–23)
CALCIUM SERPL-MCNC: 8.6 MG/DL (ref 8.6–10.3)
CHLORIDE SERPL-SCNC: 101 MMOL/L (ref 98–107)
CO2 SERPL-SCNC: 27 MMOL/L (ref 21–32)
CREAT SERPL-MCNC: 0.82 MG/DL (ref 0.5–1.05)
EGFRCR SERPLBLD CKD-EPI 2021: 66 ML/MIN/1.73M*2
ERYTHROCYTE [DISTWIDTH] IN BLOOD BY AUTOMATED COUNT: 13.3 % (ref 11.5–14.5)
GLUCOSE SERPL-MCNC: 99 MG/DL (ref 74–99)
HCT VFR BLD AUTO: 34.9 % (ref 36–46)
HGB BLD-MCNC: 11.3 G/DL (ref 12–16)
MCH RBC QN AUTO: 32.4 PG (ref 26–34)
MCHC RBC AUTO-ENTMCNC: 32.4 G/DL (ref 32–36)
MCV RBC AUTO: 100 FL (ref 80–100)
NRBC BLD-RTO: 0 /100 WBCS (ref 0–0)
PLATELET # BLD AUTO: 209 X10*3/UL (ref 150–450)
POTASSIUM SERPL-SCNC: 4 MMOL/L (ref 3.5–5.3)
RBC # BLD AUTO: 3.49 X10*6/UL (ref 4–5.2)
SODIUM SERPL-SCNC: 134 MMOL/L (ref 136–145)
WBC # BLD AUTO: 5.6 X10*3/UL (ref 4.4–11.3)

## 2024-02-06 PROCEDURE — 36415 COLL VENOUS BLD VENIPUNCTURE: CPT | Performed by: PHYSICIAN ASSISTANT

## 2024-02-06 PROCEDURE — 97161 PT EVAL LOW COMPLEX 20 MIN: CPT | Mod: GP

## 2024-02-06 PROCEDURE — 80048 BASIC METABOLIC PNL TOTAL CA: CPT | Performed by: PHYSICIAN ASSISTANT

## 2024-02-06 PROCEDURE — 2500000004 HC RX 250 GENERAL PHARMACY W/ HCPCS (ALT 636 FOR OP/ED): Performed by: INTERNAL MEDICINE

## 2024-02-06 PROCEDURE — G0378 HOSPITAL OBSERVATION PER HR: HCPCS

## 2024-02-06 PROCEDURE — 97165 OT EVAL LOW COMPLEX 30 MIN: CPT | Mod: GO

## 2024-02-06 PROCEDURE — 99233 SBSQ HOSP IP/OBS HIGH 50: CPT | Performed by: INTERNAL MEDICINE

## 2024-02-06 PROCEDURE — 85027 COMPLETE CBC AUTOMATED: CPT | Performed by: PHYSICIAN ASSISTANT

## 2024-02-06 RX ADMIN — ENOXAPARIN SODIUM 40 MG: 40 INJECTION SUBCUTANEOUS at 17:18

## 2024-02-06 ASSESSMENT — COGNITIVE AND FUNCTIONAL STATUS - GENERAL
DRESSING REGULAR LOWER BODY CLOTHING: A LITTLE
STANDING UP FROM CHAIR USING ARMS: A LITTLE
WALKING IN HOSPITAL ROOM: A LITTLE
DAILY ACTIVITIY SCORE: 22
HELP NEEDED FOR BATHING: A LITTLE
DRESSING REGULAR LOWER BODY CLOTHING: A LOT
TURNING FROM BACK TO SIDE WHILE IN FLAT BAD: A LITTLE
DRESSING REGULAR LOWER BODY CLOTHING: A LITTLE
STANDING UP FROM CHAIR USING ARMS: A LITTLE
MOBILITY SCORE: 19
MOVING TO AND FROM BED TO CHAIR: A LITTLE
WALKING IN HOSPITAL ROOM: A LITTLE
HELP NEEDED FOR BATHING: A LITTLE
MOVING TO AND FROM BED TO CHAIR: A LITTLE
TURNING FROM BACK TO SIDE WHILE IN FLAT BAD: A LITTLE
DRESSING REGULAR UPPER BODY CLOTHING: A LITTLE
MOBILITY SCORE: 19
STANDING UP FROM CHAIR USING ARMS: A LITTLE
CLIMB 3 TO 5 STEPS WITH RAILING: A LOT
DAILY ACTIVITIY SCORE: 22
HELP NEEDED FOR BATHING: A LITTLE
TOILETING: A LITTLE
MOBILITY SCORE: 18
WALKING IN HOSPITAL ROOM: A LITTLE
TURNING FROM BACK TO SIDE WHILE IN FLAT BAD: A LITTLE
MOVING TO AND FROM BED TO CHAIR: A LITTLE
CLIMB 3 TO 5 STEPS WITH RAILING: A LITTLE
CLIMB 3 TO 5 STEPS WITH RAILING: A LITTLE
DAILY ACTIVITIY SCORE: 19

## 2024-02-06 ASSESSMENT — ACTIVITIES OF DAILY LIVING (ADL)
ADL_ASSISTANCE: INDEPENDENT
ADL_ASSISTANCE: INDEPENDENT
BATHING_ASSISTANCE: INDEPENDENT
BATHING_ASSISTANCE: INDEPENDENT

## 2024-02-06 ASSESSMENT — PAIN - FUNCTIONAL ASSESSMENT
PAIN_FUNCTIONAL_ASSESSMENT: 0-10

## 2024-02-06 ASSESSMENT — PAIN SCALES - GENERAL
PAINLEVEL_OUTOF10: 0 - NO PAIN

## 2024-02-06 NOTE — PROGRESS NOTES
"Grace Ahn is a 89 y.o. female on day 0 of admission presenting with Syncope and collapse.    Subjective   No acute events overnight. I spent >50 min in the room with patient and her daughter going over the various causes of her recurrent syncope. Daughter is very concerned that patient is not compliant with her fluid intake despite the fact that this has been an ongoing issue for >15 years. Daughter and son in law would like social work to see and would potentially like a capacity eval so that they can start working on guardianship.        Objective     VITALS  Blood pressure 135/52, pulse 66, temperature 36.3 °C (97.3 °F), temperature source Oral, resp. rate 19, height 1.575 m (5' 2\"), weight 54.1 kg (119 lb 4.3 oz), SpO2 96 %.  Physical Exam  Vitals and nursing note reviewed.   Constitutional:       General: She is not in acute distress.     Appearance: She is normal weight. She is not ill-appearing or toxic-appearing.   HENT:      Head: Normocephalic and atraumatic.   Eyes:      Extraocular Movements: Extraocular movements intact.      Conjunctiva/sclera: Conjunctivae normal.   Cardiovascular:      Rate and Rhythm: Normal rate and regular rhythm.      Heart sounds: Normal heart sounds.   Pulmonary:      Effort: Pulmonary effort is normal. No respiratory distress.      Breath sounds: Normal breath sounds.   Abdominal:      General: Bowel sounds are normal.      Palpations: Abdomen is soft.   Musculoskeletal:         General: Normal range of motion.      Cervical back: Normal range of motion and neck supple.   Skin:     General: Skin is warm and dry.      Capillary Refill: Capillary refill takes less than 2 seconds.   Neurological:      General: No focal deficit present.      Mental Status: She is alert.   Psychiatric:         Mood and Affect: Mood normal.         Behavior: Behavior normal.           Intake/Output last 3 Shifts:  I/O last 3 completed shifts:  In: 460 (8.5 mL/kg) [P.O.:460]  Out: 250 (4.6 " mL/kg) [Urine:250 (0.1 mL/kg/hr)]  Weight: 54.1 kg     Relevant Results  Results for orders placed or performed during the hospital encounter of 02/05/24 (from the past 24 hour(s))   Troponin I, High Sensitivity   Result Value Ref Range    Troponin I, High Sensitivity 22 (H) 0 - 13 ng/L   Basic metabolic panel   Result Value Ref Range    Glucose 99 74 - 99 mg/dL    Sodium 134 (L) 136 - 145 mmol/L    Potassium 4.0 3.5 - 5.3 mmol/L    Chloride 101 98 - 107 mmol/L    Bicarbonate 27 21 - 32 mmol/L    Anion Gap 10 10 - 20 mmol/L    Urea Nitrogen 20 6 - 23 mg/dL    Creatinine 0.82 0.50 - 1.05 mg/dL    eGFR 66 >60 mL/min/1.73m*2    Calcium 8.6 8.6 - 10.3 mg/dL   CBC   Result Value Ref Range    WBC 5.6 4.4 - 11.3 x10*3/uL    nRBC 0.0 0.0 - 0.0 /100 WBCs    RBC 3.49 (L) 4.00 - 5.20 x10*6/uL    Hemoglobin 11.3 (L) 12.0 - 16.0 g/dL    Hematocrit 34.9 (L) 36.0 - 46.0 %     80 - 100 fL    MCH 32.4 26.0 - 34.0 pg    MCHC 32.4 32.0 - 36.0 g/dL    RDW 13.3 11.5 - 14.5 %    Platelets 209 150 - 450 x10*3/uL       Imaging Results  CT head wo IV contrast   Final Result   1. Diffuse volume loss and periventricular white matter changes, most   consistent with small vessel ischemic disease.   2. No evidence of acute cortical infarct or intracranial hemorrhage.             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   Signed by Kyle French MD          Medications:  enoxaparin, 40 mg, subcutaneous, q24h  polyethylene glycol, 17 g, oral, Daily       PRN medications: acetaminophen **OR** acetaminophen **OR** acetaminophen, albuterol, ondansetron **OR** ondansetron, oxygen        Assessment/Plan   Principal Problem:    Syncope and collapse  Active Problems:    Acute cough    Recurrent syncopal episodes with autonomic dysfunction  -Discussed appropriate water consumption.   -Recheck orthostatic  vitals    Dementia   - Scored moderate on MOCA recently  -Will ask Dr. Sierra to do a capacity eval   -Family would like to start working on guardianship     DVT Prophylaxis:  Lovenox subq    Disposition:  Suspect can DC tomorrow back to independent living once capacity eval done     Ramone Varghese, DO  Hospital Medicine
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
## 2024-02-06 NOTE — PROGRESS NOTES
Occupational Therapy    Evaluation    Patient Name: Grace Ahn  MRN: [id number]  Today's Date: 2/6/2024  Time Calculation  Start Time: 1014  Stop Time: 1028  Time Calculation (min): 14 min    Assessment  IP OT Assessment  Prognosis: Good  Barriers to Discharge: None  Evaluation/Treatment Tolerance: Patient tolerated treatment well  End of Session Communication: Bedside nurse  End of Session Patient Position: Bed, 3 rail up, Up in chair  Plan:  OT Frequency: OT eval only  OT Discharge Recommendations: No OT needed after discharge, No further acute OT  OT - OK to Discharge: Yes    Subjective   Current Problem:  1. Syncope and collapse          General:  General  Reason for Referral: Syncope  Referred By: EIDE Maya MD  Past Medical History Relevant to Rehab: dementia, delusions, L patellar fx UTI, HLD, HTN, DDD, OA knee, MVP, sleep apnea, osteoporosis  Family/Caregiver Present: No  Co-Treatment: PT  Co-Treatment Reason: Co eval with PT to maximize pt mobility, participation, and safety  Prior to Session Communication: Bedside nurse  Patient Position Received: Bed, 3 rail up, Alarm on  Preferred Learning Style: verbal, visual, auditory  General Comment: Pt agreeable to OT eval  Precautions:  Medical Precautions: Fall precautions    Objective   Cognition:  Orientation Level: Oriented X4     Home Living:  Type of Home: Independent living  Lives With: Alone  Home Adaptive Equipment: None  Home Layout: One level  Home Access: Level entry  Bathroom Shower/Tub: Walk-in shower  Bathroom Toilet: Standard  Bathroom Equipment: Grab bars in shower   Prior Function:  Level of Malcolm: Independent with ADLs and functional transfers, Needs assistance with homemaking (Has  once a week, goes to dining room for meals)  Receives Help From: Other (Comment) (facility provides assist as needed)  ADL Assistance: Independent  Homemaking Assistance: Needs assistance  Homemaking Assistance Comments: Pt hqs  in once a  week  Hand Dominance: Right  IADL History:     ADL:  Eating Assistance: Independent  Grooming Assistance: Independent  Bathing Assistance: Independent  UE Dressing Assistance: Independent  LE Dressing Assistance: Stand by  Toileting Assistance with Device: Maximal  Toileting Deficit:  (purewick)  Activity Tolerance:  Endurance: Tolerates 30 min exercise with multiple rests  Bed Mobility/Transfers: Bed Mobility  Bed Mobility: Yes  Bed Mobility 1  Bed Mobility 1: Supine to sitting, Sitting to supine  Level of Assistance 1: Close supervision    Transfers  Transfer: Yes  Transfer 1  Technique 1: Sit to stand, Stand to sit  Transfer Level of Assistance 1: Close supervision      Ambulation/Gait Training:  Ambulation/Gait Training  Ambulation/Gait Training Performed: Yes  Ambulation/Gait Training 1  Surface 1: Level tile  Device 1: No device  Assistance 1: Close supervision  Sitting Balance:  Static Sitting Balance  Static Sitting-Balance Support: Feet supported  Static Sitting-Level of Assistance: Independent  Dynamic Sitting Balance  Dynamic Sitting-Balance Support: Feet supported  Dynamic Sitting-Balance: Reaching for objects, Reaching across midline     Vision: Vision - Basic Assessment  Current Vision: No visual deficits    Coordination:  Movements are Fluid and Coordinated: Yes  Finger to Nose: Intact  Finger to Target: Intact  Coordination Comment: intact      Extremities: RUE   RUE : Within Functional Limits and LUE   LUE: Within Functional Limits    Outcome Measures: [facility] Daily Activity  Putting on and taking off regular lower body clothing: A little  Bathing (including washing, rinsing, drying): A little  Putting on and taking off regular upper body clothing: None  Toileting, which includes using toilet, bedpan or urinal: None  Taking care of personal grooming such as brushing teeth: None  Eating Meals: None  Daily Activity - Total Score: 22

## 2024-02-06 NOTE — PROGRESS NOTES
Physical Therapy    Physical Therapy Evaluation    Patient Name: Grace Ahn  MRN: [id number]  Today's Date: 2/6/2024   Time Calculation  Start Time: 1015  Stop Time: 1029  Time Calculation (min): 14 min    Assessment/Plan   PT Assessment  Rehab Prognosis: Good  Evaluation/Treatment Tolerance: Patient tolerated treatment well  Medical Staff Made Aware: Yes  Strengths: Support of Caregivers, Physical health, Housing layout  End of Session Communication: Bedside nurse, Care Coordinator  Assessment Comment: PT eval complete. No hands on assist needed this session, pt appears at or very near functional baseline. No acute PT needs at this time, D/C from caseload. Nursing and TCC notified.  End of Session Patient Position: Bed, 3 rail up, Alarm on  IP OR SWING BED PT PLAN  Inpatient or Swing Bed: Inpatient  PT Plan  PT Plan: PT Eval only  PT Eval Only Reason: At baseline function  PT Frequency: PT eval only  PT Discharge Recommendations: No further acute PT  PT Recommended Transfer Status: Stand by assist  PT - OK to Discharge: Yes (Per PT POC)      Subjective   General Visit Information:  General  Reason for Referral: Impaired Mobility; Syncope  Referred By: EDIE Maya MD  Past Medical History Relevant to Rehab: dementia, delusions, L patellar fx UTI, HLD, HTN, DDD, OA knee, MVP, sleep apnea, osteoporosis  Family/Caregiver Present: No  Co-Treatment: OT  Co-Treatment Reason: Co eval with OT to maximize pt mobility, participation, and safety  Prior to Session Communication: Bedside nurse  Patient Position Received: Bed, 3 rail up, Alarm on  Preferred Learning Style: verbal, visual, auditory  General Comment: Pt agreeable to eval, demo's ability to move at SBA or greater, no needs at this time.  Home Living:  Home Living  Type of Home: Independent living  Lives With: Alone  Home Adaptive Equipment: None  Home Layout: One level  Home Access: Level entry  Bathroom Shower/Tub: Walk-in shower  Bathroom Toilet: Standard  Bathroom  Equipment: Grab bars in shower  Prior Level of Function:  Prior Function Per Pt/Caregiver Report  Level of [place]: Independent with ADLs and functional transfers, Needs assistance with homemaking (Has  once a week, goes to dining room for meals)  Receives Help From: Other (Comment) (facility provides assist as needed)  ADL Assistance: Independent  Homemaking Assistance: Needs assistance  Homemaking Assistance Comments: Pt has  in once a week  Ambulatory Assistance: Independent (Reports ambulation without device, falls regularly from vasovagal issues, known prior to admission, reason for admission.)  Hand Dominance: Right  Precautions:  Precautions  Hearing/Visual Limitations: Reading glasses  Medical Precautions: Fall precautions  Vital Signs:       Objective   Pain:  Pain Assessment  Pain Assessment: 0-10  Pain Score: 0 - No pain  Cognition:  Cognition  Overall Cognitive Status: Within Functional Limits  Orientation Level: Oriented X4    General Assessments:  General Observation  General Observation: Pt needing no hands on assist during session, appears at or very near functional baseline.               Activity Tolerance  Endurance: Tolerates 30 min exercise with multiple rests    Sensation  Light Touch: No apparent deficits    Strength  Strength Comments: WFL for functional mobility  Strength  Strength Comments: WFL for functional mobility    Perception  Inattention/Neglect: Appears intact      Coordination  Movements are Fluid and Coordinated: Yes  Finger to Nose: Intact  Finger to Target: Intact  Coordination Comment: intact    Postural Control  Postural Control: Within Functional Limits    Static Sitting Balance  Static Sitting-Balance Support: Feet supported, No upper extremity supported  Static Sitting-Level of Assistance: Modified independent  Dynamic Sitting Balance  Dynamic Sitting-Balance Support: Feet supported, No upper extremity supported  Dynamic Sitting-Balance: Forward  lean, Trunk control activities    Static Standing Balance  Static Standing-Balance Support: No upper extremity supported  Static Standing-Level of Assistance: Distant supervision  Dynamic Standing Balance  Dynamic Standing-Balance Support: No upper extremity supported  Dynamic Standing-Balance:  (Ambulation)  Functional Assessments:  Bed Mobility  Bed Mobility: Yes  Bed Mobility 1  Bed Mobility 1: Supine to sitting, Sitting to supine  Level of Assistance 1: Distant supervision  Bed Mobility Comments 1: Pt needing no hands on assist, moving fluidly to and from EOB, appears at baseline    Transfers  Transfer: Yes  Transfer 1  Technique 1: Sit to stand, Stand to sit  Transfer Level of Assistance 1: Close supervision  Trials/Comments 1: Unsure initially about pt stability, but with increased time, decreased supervision    Ambulation/Gait Training  Ambulation/Gait Training Performed: Yes  Ambulation/Gait Training 1  Surface 1: Level tile  Device 1: No device  Assistance 1: Distant supervision  Quality of Gait 1:  (Appropriate susan, gait cycle, no instability or LOB, no hands on assist)  Comments/Distance (ft) 1: 25'    Stairs  Stairs: No (as per reported facility setup)  Extremity/Trunk Assessments:  RUE   RUE : Within Functional Limits  LUE   LUE: Within Functional Limits  RLE   RLE : Within Functional Limits  LLE   LLE : Within Functional Limits  Outcome Measures:  [facility] Basic Mobility  Turning from your back to your side while in a flat bed without using bedrails: None  Moving from lying on your back to sitting on the side of a flat bed without using bedrails: A little  Moving to and from bed to chair (including a wheelchair): A little  Standing up from a chair using your arms (e.g. wheelchair or bedside chair): A little  To walk in hospital room: A little  Climbing 3-5 steps with railing: A little  Basic Mobility - Total Score: 19        Education Documentation  Precautions, taught by Mathieu Weber, PT at  2/6/2024  5:38 PM.  Learner: Patient  Readiness: Acceptance  Method: Demonstration, Explanation  Response: Verbalizes Understanding, Demonstrated Understanding    Body Mechanics, taught by Mathieu Weber, PT at 2/6/2024  5:38 PM.  Learner: Patient  Readiness: Acceptance  Method: Demonstration, Explanation  Response: Verbalizes Understanding, Demonstrated Understanding    Mobility Training, taught by Mathieu Weber, PT at 2/6/2024  5:38 PM.  Learner: Patient  Readiness: Acceptance  Method: Demonstration, Explanation  Response: Verbalizes Understanding, Demonstrated Understanding    Education Comments  No comments found.

## 2024-02-07 LAB
ANION GAP SERPL CALC-SCNC: 12 MMOL/L (ref 10–20)
ATRIAL RATE: 76 BPM
BUN SERPL-MCNC: 19 MG/DL (ref 6–23)
CALCIUM SERPL-MCNC: 8.4 MG/DL (ref 8.6–10.3)
CHLORIDE SERPL-SCNC: 102 MMOL/L (ref 98–107)
CO2 SERPL-SCNC: 23 MMOL/L (ref 21–32)
CREAT SERPL-MCNC: 0.62 MG/DL (ref 0.5–1.05)
EGFRCR SERPLBLD CKD-EPI 2021: 83 ML/MIN/1.73M*2
ERYTHROCYTE [DISTWIDTH] IN BLOOD BY AUTOMATED COUNT: 13.2 % (ref 11.5–14.5)
GLUCOSE SERPL-MCNC: 97 MG/DL (ref 74–99)
HCT VFR BLD AUTO: 35 % (ref 36–46)
HGB BLD-MCNC: 11.6 G/DL (ref 12–16)
MCH RBC QN AUTO: 32.9 PG (ref 26–34)
MCHC RBC AUTO-ENTMCNC: 33.1 G/DL (ref 32–36)
MCV RBC AUTO: 99 FL (ref 80–100)
NRBC BLD-RTO: 0 /100 WBCS (ref 0–0)
P AXIS: 74 DEGREES
P OFFSET: 200 MS
P ONSET: 140 MS
PLATELET # BLD AUTO: 208 X10*3/UL (ref 150–450)
POTASSIUM SERPL-SCNC: 4 MMOL/L (ref 3.5–5.3)
PR INTERVAL: 168 MS
Q ONSET: 224 MS
QRS COUNT: 13 BEATS
QRS DURATION: 106 MS
QT INTERVAL: 426 MS
QTC CALCULATION(BAZETT): 479 MS
QTC FREDERICIA: 460 MS
R AXIS: -68 DEGREES
RBC # BLD AUTO: 3.53 X10*6/UL (ref 4–5.2)
SODIUM SERPL-SCNC: 133 MMOL/L (ref 136–145)
T AXIS: 68 DEGREES
T OFFSET: 437 MS
VENTRICULAR RATE: 76 BPM
WBC # BLD AUTO: 6.4 X10*3/UL (ref 4.4–11.3)

## 2024-02-07 PROCEDURE — 99233 SBSQ HOSP IP/OBS HIGH 50: CPT | Performed by: INTERNAL MEDICINE

## 2024-02-07 PROCEDURE — 2500000004 HC RX 250 GENERAL PHARMACY W/ HCPCS (ALT 636 FOR OP/ED): Performed by: INTERNAL MEDICINE

## 2024-02-07 PROCEDURE — 85027 COMPLETE CBC AUTOMATED: CPT | Performed by: PHYSICIAN ASSISTANT

## 2024-02-07 PROCEDURE — G0378 HOSPITAL OBSERVATION PER HR: HCPCS

## 2024-02-07 PROCEDURE — 80048 BASIC METABOLIC PNL TOTAL CA: CPT | Performed by: PHYSICIAN ASSISTANT

## 2024-02-07 PROCEDURE — 36415 COLL VENOUS BLD VENIPUNCTURE: CPT | Performed by: PHYSICIAN ASSISTANT

## 2024-02-07 RX ORDER — LANOLIN ALCOHOL/MO/W.PET/CERES
100 CREAM (GRAM) TOPICAL
Qty: 60 TABLET | Refills: 1 | Status: SHIPPED | OUTPATIENT
Start: 2024-02-08 | End: 2024-02-13 | Stop reason: ALTCHOICE

## 2024-02-07 RX ORDER — LANOLIN ALCOHOL/MO/W.PET/CERES
100 CREAM (GRAM) TOPICAL
Status: DISCONTINUED | OUTPATIENT
Start: 2024-02-08 | End: 2024-02-08 | Stop reason: HOSPADM

## 2024-02-07 RX ORDER — SODIUM CHLORIDE 1000 MG
1 TABLET, SOLUBLE MISCELLANEOUS 3 TIMES DAILY
Qty: 90 TABLET | Refills: 0 | Status: SHIPPED | OUTPATIENT
Start: 2024-02-07 | End: 2024-02-13 | Stop reason: ALTCHOICE

## 2024-02-07 RX ADMIN — ENOXAPARIN SODIUM 40 MG: 40 INJECTION SUBCUTANEOUS at 15:55

## 2024-02-07 ASSESSMENT — COGNITIVE AND FUNCTIONAL STATUS - GENERAL
DAILY ACTIVITIY SCORE: 22
TURNING FROM BACK TO SIDE WHILE IN FLAT BAD: A LITTLE
HELP NEEDED FOR BATHING: A LITTLE
CLIMB 3 TO 5 STEPS WITH RAILING: A LITTLE
MOVING TO AND FROM BED TO CHAIR: A LITTLE
MOBILITY SCORE: 19
DAILY ACTIVITIY SCORE: 22
STANDING UP FROM CHAIR USING ARMS: A LITTLE
TURNING FROM BACK TO SIDE WHILE IN FLAT BAD: A LITTLE
WALKING IN HOSPITAL ROOM: A LITTLE
DRESSING REGULAR LOWER BODY CLOTHING: A LITTLE
CLIMB 3 TO 5 STEPS WITH RAILING: A LITTLE
MOBILITY SCORE: 19
DRESSING REGULAR LOWER BODY CLOTHING: A LITTLE
MOVING TO AND FROM BED TO CHAIR: A LITTLE
HELP NEEDED FOR BATHING: A LITTLE
WALKING IN HOSPITAL ROOM: A LITTLE
STANDING UP FROM CHAIR USING ARMS: A LITTLE

## 2024-02-07 ASSESSMENT — PAIN SCALES - GENERAL: PAINLEVEL_OUTOF10: 0 - NO PAIN

## 2024-02-07 ASSESSMENT — PAIN - FUNCTIONAL ASSESSMENT: PAIN_FUNCTIONAL_ASSESSMENT: 0-10

## 2024-02-07 NOTE — PROGRESS NOTES
Care Coordinator Note:  Disposition: home, no further therapy needs per PT/OT recommendation  SW on duty following for social needs   Archana KIRKLANDN, RN TCC

## 2024-02-07 NOTE — CONSULTS
"Reason For Consult  Capacity  Information from daughter Drea Ayse TINOCO, son in law Ozzy Ayse TINOCO    History Of Present Illness  Grace Ahn is a 89 y.o. female, moderate dementia, lives in independent sr living, admitted 2/5/24 after recurrent syncope. Pt has had syncopal episodes for at least 20 years, refuses medications, did try salt tabs years ago that caused hypertension. Not a candidate for midodrine due to systolic HTN. Tends to run mildly hyponatremic (lowest Na in chart 130 in past, was 133 on admission).    Per daughter     Sleep good - watches TV until falls asleep, wakes at 7.   Appetite good, now eating desserts, may have gained a \"little weight\".   Mood euthymic most of the time.   Chair volleyball and yoga  Likes the activities  Goes shopping with girls - goes on shuttle to store.       Per ED:  89-year-old white female with a history of some dementia delusions and longstanding vasovagal syncope comes in with syncopal episode and generalized weakness. Family states that yesterday she had a low-grade fever for which she was being managed conservatively. Last night patient answered the door when the aide knocked the door and she got up she had a vasovagal syncopal event in the ED for concern she called 911. The patient has no particular complaints. She just feels weak generally.      Past psychiatric hx:   Negative for treatment or hospitalization    Past Medical History  She has a past medical history of Acute cystitis without hematuria (02/10/2020), Adverse effect of unspecified drugs, medicaments and biological substances, initial encounter (09/09/2021), Delusional disorders ([facility]) (11/02/2021), Laceration without foreign body, left lower leg, initial encounter (03/15/2020), Other fracture of left patella, initial encounter for closed fracture (02/16/2020), Personal history of other specified conditions (02/24/2020), Personal history of urinary (tract) infections (12/08/2021), and " "Unspecified fracture of left patella, initial encounter for closed fracture (02/06/2020).    Surgical History  She has a past surgical history that includes Other surgical history (12/10/2019); Other surgical history (12/10/2019); Other surgical history (12/10/2019); Other surgical history (12/10/2019); Other surgical history (12/10/2019); Other surgical history (11/02/2021); Other surgical history (02/23/2022); Other surgical history (02/23/2022); Other surgical history (02/23/2022); Other surgical history (02/23/2022); MR angio head wo IV contrast (7/25/2022); and MR angio neck wo IV contrast (7/25/2022).     Social History  She reports that she has never smoked. She has never used smokeless tobacco. No history on file for alcohol use and drug use.    Family History  No family history on file.     Allergies  Bacitracin-polymyxin b, Latex, Penicillins, Adhesive tape-silicones, Bacitracin, Cephalosporins, Chlorine, Ciprofloxacin, Doxycycline, Neomycin, Omeprazole, Pantoprazole, and Sulfamethoxazole     Physical Exam  Physical Exam      Last Recorded Vitals  Blood pressure (!) 134/48, pulse 63, temperature 37.2 °C (99 °F), temperature source Temporal, resp. rate 18, height 1.575 m (5' 2\"), weight 54.1 kg (119 lb 4.3 oz), SpO2 96 %.    Relevant Results  Scheduled medications  enoxaparin, 40 mg, subcutaneous, q24h  polyethylene glycol, 17 g, oral, Daily      PRN medications: acetaminophen **OR** acetaminophen **OR** acetaminophen, albuterol, ondansetron **OR** ondansetron, oxygen       Results for orders placed or performed during the hospital encounter of 02/05/24 (from the past 96 hour(s))   ECG 12 lead   Result Value Ref Range    Ventricular Rate 76 BPM    Atrial Rate 76 BPM    SC Interval 168 ms    QRS Duration 106 ms    QT Interval 426 ms    QTC Calculation(Bazett) 479 ms    P Axis 74 degrees    R Axis -68 degrees    T Axis 68 degrees    QRS Count 13 beats    Q Onset 224 ms    P Onset 140 ms    P Offset 200 ms    " T Offset 437 ms    QTC Fredericia 460 ms   CBC with Differential   Result Value Ref Range    WBC 9.1 4.4 - 11.3 x10*3/uL    nRBC 0.0 0.0 - 0.0 /100 WBCs    RBC 3.77 (L) 4.00 - 5.20 x10*6/uL    Hemoglobin 12.5 12.0 - 16.0 g/dL    Hematocrit 36.2 36.0 - 46.0 %    MCV 96 80 - 100 fL    MCH 33.2 26.0 - 34.0 pg    MCHC 34.5 32.0 - 36.0 g/dL    RDW 13.2 11.5 - 14.5 %    Platelets 234 150 - 450 x10*3/uL    Neutrophils % 57.2 40.0 - 80.0 %    Immature Granulocytes %, Automated 0.3 0.0 - 0.9 %    Lymphocytes % 30.3 13.0 - 44.0 %    Monocytes % 11.4 2.0 - 10.0 %    Eosinophils % 0.5 0.0 - 6.0 %    Basophils % 0.3 0.0 - 2.0 %    Neutrophils Absolute 5.19 1.60 - 5.50 x10*3/uL    Immature Granulocytes Absolute, Automated 0.03 0.00 - 0.50 x10*3/uL    Lymphocytes Absolute 2.76 0.80 - 3.00 x10*3/uL    Monocytes Absolute 1.04 (H) 0.05 - 0.80 x10*3/uL    Eosinophils Absolute 0.05 0.00 - 0.40 x10*3/uL    Basophils Absolute 0.03 0.00 - 0.10 x10*3/uL   Comprehensive Metabolic Panel   Result Value Ref Range    Glucose 131 (H) 74 - 99 mg/dL    Sodium 134 (L) 136 - 145 mmol/L    Potassium 3.7 3.5 - 5.3 mmol/L    Chloride 101 98 - 107 mmol/L    Bicarbonate 23 21 - 32 mmol/L    Anion Gap 14 10 - 20 mmol/L    Urea Nitrogen 22 6 - 23 mg/dL    Creatinine 0.85 0.50 - 1.05 mg/dL    eGFR 64 >60 mL/min/1.73m*2    Calcium 9.0 8.6 - 10.3 mg/dL    Albumin 3.6 3.4 - 5.0 g/dL    Alkaline Phosphatase 58 33 - 136 U/L    Total Protein 6.6 6.4 - 8.2 g/dL    AST 18 9 - 39 U/L    Bilirubin, Total 0.5 0.0 - 1.2 mg/dL    ALT 9 7 - 45 U/L   Sars-CoV-2 and Influenza A/B PCR   Result Value Ref Range    Flu A Result Not Detected Not Detected    Flu B Result Not Detected Not Detected    Coronavirus 2019, PCR Not Detected Not Detected   RSV PCR   Result Value Ref Range    RSV PCR Not Detected Not Detected   Troponin I, High Sensitivity   Result Value Ref Range    Troponin I, High Sensitivity 23 (H) 0 - 13 ng/L   Blood Gas Venous Full Panel   Result Value Ref Range     POCT pH, Venous 7.37 7.33 - 7.43 pH    POCT pCO2, Venous 43 41 - 51 mm Hg    POCT pO2, Venous 43 35 - 45 mm Hg    POCT SO2, Venous 65 45 - 75 %    POCT Oxy Hemoglobin, Venous 63.8 45.0 - 75.0 %    POCT Hematocrit Calculated, Venous 36.0 36.0 - 46.0 %    POCT Sodium, Venous 133 (L) 136 - 145 mmol/L    POCT Potassium, Venous 3.8 3.5 - 5.3 mmol/L    POCT Chloride, Venous 102 98 - 107 mmol/L    POCT Ionized Calicum, Venous 1.19 1.10 - 1.33 mmol/L    POCT Glucose, Venous 117 (H) 74 - 99 mg/dL    POCT Lactate, Venous 1.3 0.4 - 2.0 mmol/L    POCT Base Excess, Venous -0.5 -2.0 - 3.0 mmol/L    POCT HCO3 Calculated, Venous 24.9 22.0 - 26.0 mmol/L    POCT Hemoglobin, Venous 12.1 12.0 - 16.0 g/dL    POCT Anion Gap, Venous 10.0 10.0 - 25.0 mmol/L    Patient Temperature 37.0 degrees Celsius    FiO2 22 %   Blood Culture    Specimen: Peripheral Venipuncture; Blood culture   Result Value Ref Range    Blood Culture No growth at 2 days    Blood Culture    Specimen: Peripheral Venipuncture; Blood culture   Result Value Ref Range    Blood Culture No growth at 2 days    Troponin I, High Sensitivity   Result Value Ref Range    Troponin I, High Sensitivity 22 (H) 0 - 13 ng/L   Basic metabolic panel   Result Value Ref Range    Glucose 99 74 - 99 mg/dL    Sodium 134 (L) 136 - 145 mmol/L    Potassium 4.0 3.5 - 5.3 mmol/L    Chloride 101 98 - 107 mmol/L    Bicarbonate 27 21 - 32 mmol/L    Anion Gap 10 10 - 20 mmol/L    Urea Nitrogen 20 6 - 23 mg/dL    Creatinine 0.82 0.50 - 1.05 mg/dL    eGFR 66 >60 mL/min/1.73m*2    Calcium 8.6 8.6 - 10.3 mg/dL   CBC   Result Value Ref Range    WBC 5.6 4.4 - 11.3 x10*3/uL    nRBC 0.0 0.0 - 0.0 /100 WBCs    RBC 3.49 (L) 4.00 - 5.20 x10*6/uL    Hemoglobin 11.3 (L) 12.0 - 16.0 g/dL    Hematocrit 34.9 (L) 36.0 - 46.0 %     80 - 100 fL    MCH 32.4 26.0 - 34.0 pg    MCHC 32.4 32.0 - 36.0 g/dL    RDW 13.3 11.5 - 14.5 %    Platelets 209 150 - 450 x10*3/uL   Basic metabolic panel   Result Value Ref Range     Glucose 97 74 - 99 mg/dL    Sodium 133 (L) 136 - 145 mmol/L    Potassium 4.0 3.5 - 5.3 mmol/L    Chloride 102 98 - 107 mmol/L    Bicarbonate 23 21 - 32 mmol/L    Anion Gap 12 10 - 20 mmol/L    Urea Nitrogen 19 6 - 23 mg/dL    Creatinine 0.62 0.50 - 1.05 mg/dL    eGFR 83 >60 mL/min/1.73m*2    Calcium 8.4 (L) 8.6 - 10.3 mg/dL   CBC   Result Value Ref Range    WBC 6.4 4.4 - 11.3 x10*3/uL    nRBC 0.0 0.0 - 0.0 /100 WBCs    RBC 3.53 (L) 4.00 - 5.20 x10*6/uL    Hemoglobin 11.6 (L) 12.0 - 16.0 g/dL    Hematocrit 35.0 (L) 36.0 - 46.0 %    MCV 99 80 - 100 fL    MCH 32.9 26.0 - 34.0 pg    MCHC 33.1 32.0 - 36.0 g/dL    RDW 13.2 11.5 - 14.5 %    Platelets 208 150 - 450 x10*3/uL       Assessment/Plan     ***    I spent *** minutes in the professional and overall care of this patient.      Winnie Sierra MD

## 2024-02-07 NOTE — PROGRESS NOTES
HCPOA paperwork placed in chart. Josephine Tavera listed as HCPOA.    Addendum: Met with patient and daughter, Rhoda at bedside. Rhoda provided background information about family dynamics including strained relationship with sister, Josephine. Rhoda interested in gaining access to patient's medical records. Josephine is patient's HCPOA. There is a pending capacity eval with psych. KINDRA explained that once capacity eval was completed then patient can consent to daughter, Rhoda accessing her medical records. If patient does not have capacity then Josephine would have to give Rhoda access. Rhoda states she is open to meeting with Josephine, but Josephine has not been answering her calls/texts.    Addendum: KINDRA called sister, Josephine. Josephine provided information surrounding concerns about mom's physical health and capacity including short term memory loss, externe concern about what other thinks. Josephine shared, from her perspective, about family dynamics.  Josephine not planning to give Rhoda access to medical records. KINDRA will follow for capacity evaluation for next steps.

## 2024-02-08 ENCOUNTER — APPOINTMENT (OUTPATIENT)
Dept: CARDIOLOGY | Facility: HOSPITAL | Age: 89
End: 2024-02-08
Payer: MEDICARE

## 2024-02-08 VITALS
WEIGHT: 119.27 LBS | OXYGEN SATURATION: 99 % | HEART RATE: 99 BPM | RESPIRATION RATE: 18 BRPM | TEMPERATURE: 97.5 F | SYSTOLIC BLOOD PRESSURE: 136 MMHG | DIASTOLIC BLOOD PRESSURE: 60 MMHG | BODY MASS INDEX: 21.95 KG/M2 | HEIGHT: 62 IN

## 2024-02-08 LAB
ANION GAP SERPL CALC-SCNC: 12 MMOL/L (ref 10–20)
APPEARANCE UR: CLEAR
BILIRUB UR STRIP.AUTO-MCNC: NEGATIVE MG/DL
BUN SERPL-MCNC: 17 MG/DL (ref 6–23)
CALCIUM SERPL-MCNC: 8.6 MG/DL (ref 8.6–10.3)
CHLORIDE SERPL-SCNC: 103 MMOL/L (ref 98–107)
CO2 SERPL-SCNC: 24 MMOL/L (ref 21–32)
COLOR UR: NORMAL
CREAT SERPL-MCNC: 0.65 MG/DL (ref 0.5–1.05)
EGFRCR SERPLBLD CKD-EPI 2021: 82 ML/MIN/1.73M*2
ERYTHROCYTE [DISTWIDTH] IN BLOOD BY AUTOMATED COUNT: 13.1 % (ref 11.5–14.5)
GLUCOSE BLD MANUAL STRIP-MCNC: 109 MG/DL (ref 74–99)
GLUCOSE SERPL-MCNC: 107 MG/DL (ref 74–99)
GLUCOSE UR STRIP.AUTO-MCNC: NEGATIVE MG/DL
HCT VFR BLD AUTO: 35.1 % (ref 36–46)
HGB BLD-MCNC: 11.7 G/DL (ref 12–16)
KETONES UR STRIP.AUTO-MCNC: NEGATIVE MG/DL
LEUKOCYTE ESTERASE UR QL STRIP.AUTO: NEGATIVE
MCH RBC QN AUTO: 31.4 PG (ref 26–34)
MCHC RBC AUTO-ENTMCNC: 33.3 G/DL (ref 32–36)
MCV RBC AUTO: 94 FL (ref 80–100)
NITRITE UR QL STRIP.AUTO: NEGATIVE
NRBC BLD-RTO: 0 /100 WBCS (ref 0–0)
PH UR STRIP.AUTO: 8 [PH]
PLATELET # BLD AUTO: 231 X10*3/UL (ref 150–450)
POTASSIUM SERPL-SCNC: 4 MMOL/L (ref 3.5–5.3)
PROT UR STRIP.AUTO-MCNC: NEGATIVE MG/DL
RBC # BLD AUTO: 3.73 X10*6/UL (ref 4–5.2)
RBC # UR STRIP.AUTO: NEGATIVE /UL
SODIUM SERPL-SCNC: 135 MMOL/L (ref 136–145)
SP GR UR STRIP.AUTO: 1.01
UROBILINOGEN UR STRIP.AUTO-MCNC: <2 MG/DL
WBC # BLD AUTO: 6.7 X10*3/UL (ref 4.4–11.3)

## 2024-02-08 PROCEDURE — 2500000004 HC RX 250 GENERAL PHARMACY W/ HCPCS (ALT 636 FOR OP/ED): Performed by: INTERNAL MEDICINE

## 2024-02-08 PROCEDURE — 82374 ASSAY BLOOD CARBON DIOXIDE: CPT | Performed by: PHYSICIAN ASSISTANT

## 2024-02-08 PROCEDURE — 82947 ASSAY GLUCOSE BLOOD QUANT: CPT

## 2024-02-08 PROCEDURE — 99239 HOSP IP/OBS DSCHRG MGMT >30: CPT | Performed by: INTERNAL MEDICINE

## 2024-02-08 PROCEDURE — 85027 COMPLETE CBC AUTOMATED: CPT | Performed by: PHYSICIAN ASSISTANT

## 2024-02-08 PROCEDURE — G0378 HOSPITAL OBSERVATION PER HR: HCPCS

## 2024-02-08 PROCEDURE — 36415 COLL VENOUS BLD VENIPUNCTURE: CPT | Performed by: PHYSICIAN ASSISTANT

## 2024-02-08 PROCEDURE — 2500000001 HC RX 250 WO HCPCS SELF ADMINISTERED DRUGS (ALT 637 FOR MEDICARE OP): Performed by: PSYCHIATRY & NEUROLOGY

## 2024-02-08 PROCEDURE — 93005 ELECTROCARDIOGRAM TRACING: CPT

## 2024-02-08 PROCEDURE — 81003 URINALYSIS AUTO W/O SCOPE: CPT | Performed by: INTERNAL MEDICINE

## 2024-02-08 PROCEDURE — 96374 THER/PROPH/DIAG INJ IV PUSH: CPT

## 2024-02-08 RX ORDER — HYDRALAZINE HYDROCHLORIDE 20 MG/ML
10 INJECTION INTRAMUSCULAR; INTRAVENOUS ONCE
Status: COMPLETED | OUTPATIENT
Start: 2024-02-08 | End: 2024-02-08

## 2024-02-08 RX ORDER — HYDRALAZINE HYDROCHLORIDE 20 MG/ML
10 INJECTION INTRAMUSCULAR; INTRAVENOUS ONCE
Status: DISCONTINUED | OUTPATIENT
Start: 2024-02-08 | End: 2024-02-08 | Stop reason: HOSPADM

## 2024-02-08 RX ADMIN — HYDRALAZINE HYDROCHLORIDE 10 MG: 20 INJECTION INTRAMUSCULAR; INTRAVENOUS at 00:37

## 2024-02-08 RX ADMIN — Medication 100 MG: at 09:24

## 2024-02-08 RX ADMIN — POLYETHYLENE GLYCOL (3350) 17 G: 17 POWDER, FOR SOLUTION ORAL at 09:00

## 2024-02-08 ASSESSMENT — PAIN SCALES - GENERAL: PAINLEVEL_OUTOF10: 0 - NO PAIN

## 2024-02-08 ASSESSMENT — COGNITIVE AND FUNCTIONAL STATUS - GENERAL
MOVING TO AND FROM BED TO CHAIR: A LITTLE
STANDING UP FROM CHAIR USING ARMS: A LITTLE
HELP NEEDED FOR BATHING: A LITTLE
DAILY ACTIVITIY SCORE: 22
DRESSING REGULAR LOWER BODY CLOTHING: A LITTLE
CLIMB 3 TO 5 STEPS WITH RAILING: A LITTLE
MOBILITY SCORE: 20
WALKING IN HOSPITAL ROOM: A LITTLE

## 2024-02-08 ASSESSMENT — PAIN - FUNCTIONAL ASSESSMENT: PAIN_FUNCTIONAL_ASSESSMENT: 0-10

## 2024-02-08 NOTE — CARE PLAN
Problem: Fall/Injury  Goal: Not fall by end of shift  Outcome: Adequate for Discharge  Goal: Be free from injury by end of the shift  Outcome: Adequate for Discharge  Goal: Verbalize understanding of personal risk factors for fall in the hospital  Outcome: Adequate for Discharge  Goal: Verbalize understanding of risk factor reduction measures to prevent injury from fall in the home  Outcome: Adequate for Discharge  Goal: Use assistive devices by end of the shift  Outcome: Adequate for Discharge  Goal: Pace activities to prevent fatigue by end of the shift  Outcome: Adequate for Discharge   The patient's goals for the shift include      The clinical goals for the shift include Patient will be free of falls    Over the shift, the patient did make progress toward the following goals. Patient discharged home.

## 2024-02-08 NOTE — DISCHARGE SUMMARY
Discharge Diagnosis  Syncope and collapse    Issues Requiring Follow-Up  Continued follow up with geriatrics   Social work conversations on family dynamics     Discharge Meds     Your medication list        START taking these medications        Instructions Last Dose Given Next Dose Due   sodium chloride 1,000 mg tablet      Take 1 tablet (1 g) by mouth 3 times a day.       thiamine 100 mg tablet  Commonly known as: Vitamin B-1      Take 1 tablet (100 mg) by mouth 2 times a day with meals. Do not start before February 8, 2024.              CHANGE how you take these medications        Instructions Last Dose Given Next Dose Due   Premarin vaginal cream  Generic drug: estrogens (conjugated)  What changed: See the new instructions.      APPLY A PEA SIZED AMOUNT TO VAGINA DAILY AT BEDTIME              CONTINUE taking these medications        Instructions Last Dose Given Next Dose Due   acetaminophen 325 mg tablet  Commonly known as: Tylenol           biotin 1 mg tablet           calcium carbonate 600 mg calcium (1,500 mg) tablet           cholecalciferol 10 MCG (400 UNIT) tablet  Commonly known as: Vitamin D-3           denosumab 60 mg/mL syringe  Commonly known as: Prolia      Inject 1 mL (60 mg total) under the skin every 6 months.       One-A-Day Women's 50 Plus 400-20 mcg tablet  Generic drug: mv,Ca,min-folic acid-vit K1           ProAir RespiClick 90 mcg/actuation aerosol powdr breath activated inhaler  Generic drug: albuterol      Inhale 2 puffs every 6 hours if needed for wheezing.                 Where to Get Your Medications        These medications were sent to [id number] - [place], [place] - [address] AT [facility] [address]  [address], [facility] [address]      Phone: [phone]   sodium chloride 1,000 mg tablet  thiamine 100 mg tablet         Test Results Pending At Discharge  Pending Labs       Order Current Status    Blood Culture Preliminary result    Blood Culture  "Preliminary result            Procedures       Hospital Course   Grace was admitted to hospital after syncopal episode.  She does have a history of autonomic disorders where she has had multiple syncopal episodes in the past.  This was potentially precipitated by some mild dehydration given that she did have an upper respiratory tract infection several days prior to admission and is not great at drinking fluids.  During her stay here she was rehydrated and did well.  She had orthostatic vital signs that were negative.  There were some difficulties with patient's family as there seems to be quite a bit of miscommunication or lack of communication between patient's daughters which has complicated patient's overall discharge plan.  The patient's POA has concerns about patient's capacity and so we had the psychiatry team evaluate patient.  Their recommendations are forthcoming.  However at this time she is otherwise hemodynamically stable and appropriate for discharge.  She and her family will need to continue to follow-up outpatient for concerns of competency/capacity as well as ongoing living situations if patient is deemed unsafe at her independent living facility.  This plan was discussed with her as well as her 2 daughters and son-in-law.  They are all in agreement.  All questions were answered.    Blood pressure 136/60, pulse 99, temperature 36.4 °C (97.5 °F), temperature source Temporal, resp. rate 18, height 1.575 m (5' 2\"), weight 54.1 kg (119 lb 4.3 oz), SpO2 99 %.  Pertinent Physical Exam At Time of Discharge  Physical Exam  Vitals and nursing note reviewed.   Constitutional:       General: She is not in acute distress.     Appearance: She is normal weight. She is not ill-appearing or toxic-appearing.   HENT:      Head: Normocephalic and atraumatic.   Eyes:      Extraocular Movements: Extraocular movements intact.      Conjunctiva/sclera: Conjunctivae normal.   Cardiovascular:      Rate and Rhythm: Normal " rate and regular rhythm.      Heart sounds: Normal heart sounds.   Pulmonary:      Effort: Pulmonary effort is normal. No respiratory distress.      Breath sounds: Normal breath sounds.   Abdominal:      General: Bowel sounds are normal.      Palpations: Abdomen is soft.   Musculoskeletal:         General: Normal range of motion.      Cervical back: Normal range of motion and neck supple.   Skin:     General: Skin is warm and dry.      Capillary Refill: Capillary refill takes less than 2 seconds.   Neurological:      General: No focal deficit present.      Mental Status: She is alert.   Psychiatric:         Mood and Affect: Mood normal.         Behavior: Behavior normal.         Outpatient Follow-Up  Future Appointments   Date Time Provider Department Center   2/13/2024 11:00 AM Naima Quiñonez MD [id number] [facility]   2/27/2024 10:45 AM Leona Jones MD [id number] [facility]   4/9/2024  9:30 AM Tessy Brooks MD [id number] [facility]   5/10/2024  2:00 PM INF 01 [facility]         Ramone Varghese DO    Time spent during this discharge: >30 min

## 2024-02-08 NOTE — NURSING NOTE
Patient had blood pressure of 202/62. Dr. Engle was notified and hydralazine was ordered for patient. Patient was sleeping, I woke patient up to give medication and educate her about her high blood pressure. Rhoda Winslow (daughter) was called on behalf of patient and was notified of increased blood pressure.

## 2024-02-08 NOTE — PROGRESS NOTES
"Grace Ahn is a 89 y.o. female on day 0 of admission presenting with Syncope and collapse.    Subjective   No acute events overnight. I spent several hours with patient and either one or the other of patients daughters discussing home going needs and concerns for capacity. Patient generally feeling well and would like to go home.        Objective     VITALS  Blood pressure 136/60, pulse 99, temperature 36.4 °C (97.5 °F), temperature source Temporal, resp. rate 18, height 1.575 m (5' 2\"), weight 54.1 kg (119 lb 4.3 oz), SpO2 99 %.  Physical Exam  Vitals and nursing note reviewed.   Constitutional:       General: She is not in acute distress.     Appearance: She is normal weight. She is not ill-appearing or toxic-appearing.   HENT:      Head: Normocephalic and atraumatic.   Eyes:      Extraocular Movements: Extraocular movements intact.      Conjunctiva/sclera: Conjunctivae normal.   Cardiovascular:      Rate and Rhythm: Normal rate and regular rhythm.      Heart sounds: Normal heart sounds.   Pulmonary:      Effort: Pulmonary effort is normal. No respiratory distress.      Breath sounds: Normal breath sounds.   Abdominal:      General: Bowel sounds are normal.      Palpations: Abdomen is soft.   Musculoskeletal:         General: Normal range of motion.      Cervical back: Normal range of motion and neck supple.   Skin:     General: Skin is warm and dry.      Capillary Refill: Capillary refill takes less than 2 seconds.   Neurological:      General: No focal deficit present.      Mental Status: She is alert.   Psychiatric:         Mood and Affect: Mood normal.         Behavior: Behavior normal.           Intake/Output last 3 Shifts:  I/O last 3 completed shifts:  In: 120 (2.2 mL/kg) [P.O.:120]  Out: - (0 mL/kg)   Weight: 54.1 kg     Relevant Results  Results for orders placed or performed during the hospital encounter of 02/05/24 (from the past 24 hour(s))   POCT GLUCOSE   Result Value Ref Range    POCT Glucose " 109 (H) 74 - 99 mg/dL   Electrocardiogram, 12-lead PRN ACS symptoms   Result Value Ref Range    Ventricular Rate 85 BPM    Atrial Rate 85 BPM    ID Interval 190 ms    QRS Duration 106 ms    QT Interval 378 ms    QTC Calculation(Bazett) 449 ms    P Axis 82 degrees    R Axis -68 degrees    T Axis 88 degrees    QRS Count 14 beats    Q Onset 221 ms    P Onset 126 ms    P Offset 183 ms    T Offset 410 ms    QTC Fredericia 424 ms   Urinalysis with Reflex Microscopic   Result Value Ref Range    Color, Urine Straw Straw, Yellow    Appearance, Urine Clear Clear    Specific Gravity, Urine 1.010 1.005 - 1.035    pH, Urine 8.0 5.0, 5.5, 6.0, 6.5, 7.0, 7.5, 8.0    Protein, Urine NEGATIVE NEGATIVE mg/dL    Glucose, Urine NEGATIVE NEGATIVE mg/dL    Blood, Urine NEGATIVE NEGATIVE    Ketones, Urine NEGATIVE NEGATIVE mg/dL    Bilirubin, Urine NEGATIVE NEGATIVE    Urobilinogen, Urine <2.0 <2.0 mg/dL    Nitrite, Urine NEGATIVE NEGATIVE    Leukocyte Esterase, Urine NEGATIVE NEGATIVE   Basic metabolic panel   Result Value Ref Range    Glucose 107 (H) 74 - 99 mg/dL    Sodium 135 (L) 136 - 145 mmol/L    Potassium 4.0 3.5 - 5.3 mmol/L    Chloride 103 98 - 107 mmol/L    Bicarbonate 24 21 - 32 mmol/L    Anion Gap 12 10 - 20 mmol/L    Urea Nitrogen 17 6 - 23 mg/dL    Creatinine 0.65 0.50 - 1.05 mg/dL    eGFR 82 >60 mL/min/1.73m*2    Calcium 8.6 8.6 - 10.3 mg/dL   CBC   Result Value Ref Range    WBC 6.7 4.4 - 11.3 x10*3/uL    nRBC 0.0 0.0 - 0.0 /100 WBCs    RBC 3.73 (L) 4.00 - 5.20 x10*6/uL    Hemoglobin 11.7 (L) 12.0 - 16.0 g/dL    Hematocrit 35.1 (L) 36.0 - 46.0 %    MCV 94 80 - 100 fL    MCH 31.4 26.0 - 34.0 pg    MCHC 33.3 32.0 - 36.0 g/dL    RDW 13.1 11.5 - 14.5 %    Platelets 231 150 - 450 x10*3/uL       Imaging Results  CT head wo IV contrast   Final Result   1. Diffuse volume loss and periventricular white matter changes, most   consistent with small vessel ischemic disease.   2. No evidence of acute cortical infarct or intracranial  hemorrhage.             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   Signed by Kyle French MD          Medications:  enoxaparin, 40 mg, subcutaneous, q24h  hydrALAZINE, 10 mg, intravenous, Once  polyethylene glycol, 17 g, oral, Daily  thiamine, 100 mg, oral, BID with meals       PRN medications: acetaminophen **OR** acetaminophen **OR** acetaminophen, albuterol, ondansetron **OR** ondansetron, oxygen        Assessment/Plan   Principal Problem:    Syncope and collapse  Active Problems:    Acute cough    Recurrent syncopal episodes with autonomic dysfunction  -Discussed appropriate water consumption.   -orthostatic vitals normal     Dementia   - Scored moderate on MOCA recently  -Appreciate Dr. Jerry dumont     DVT Prophylaxis:  Lovenox subq    Disposition:  Tried to DC home today however daughter and patient refused to leave given its past 6pm.     Ramone Varghese, [facility]

## 2024-02-09 ENCOUNTER — PATIENT OUTREACH (OUTPATIENT)
Dept: PRIMARY CARE | Facility: CLINIC | Age: 89
End: 2024-02-09
Payer: MEDICARE

## 2024-02-09 LAB
BACTERIA BLD CULT: NORMAL
BACTERIA BLD CULT: NORMAL

## 2024-02-09 ASSESSMENT — PROMIS GLOBAL HEALTH SCALE
RATE_AVERAGE_PAIN: 1
RATE_PHYSICAL_HEALTH: GOOD
RATE_GENERAL_HEALTH: GOOD
EMOTIONAL_PROBLEMS: RARELY
RATE_QUALITY_OF_LIFE: GOOD
CARRYOUT_PHYSICAL_ACTIVITIES: MODERATELY
CARRYOUT_SOCIAL_ACTIVITIES: FAIR
RATE_MENTAL_HEALTH: GOOD
RATE_SOCIAL_SATISFACTION: GOOD
RATE_AVERAGE_FATIGUE: MILD

## 2024-02-09 NOTE — PROGRESS NOTES
Discharge Facility:[facility]  Discharge Diagnosis:Syncope and Collapse  Admission Date:2/5/24  Discharge Date: 2/8/24    PCP Appointment Date:2/13/24  Specialist Appointment Date: N/A  Hospital Encounter and Summary: Linked  See discharge assessment below for further details  Engagement  Call Start Time: 1340 (2/9/2024  1:40 PM)    Medications  Medications reviewed with patient/caregiver?: Yes (2/9/2024  1:40 PM)  Is the patient having any side effects they believe may be caused by any medication additions or changes?: No (2/9/2024  1:40 PM)  Does the patient have all medications ordered at discharge?: Yes (2/9/2024  1:40 PM)  Prescription Comments: sodium chloride 1,000 mg tablet        Take 1 tablet (1 g) by mouth 3 times a day.           thiamine 100 mg tablet  Commonly known as: Vitamin B-1        Take 1 tablet (100 mg) by mouth 2 times a day with meals. Do not start before February 8, 2024. (2/9/2024  1:40 PM)  Is the patient taking all medications as directed (includes completed medication regime)?: Yes (2/9/2024  1:40 PM)  Medication Comments: see med list (2/9/2024  1:40 PM)    Appointments  Does the patient have a primary care provider?: Yes (2/9/2024  1:40 PM)  Care Management Interventions: Verified appointment date/time/provider ([facility] 2/13/24) (2/9/2024  1:40 PM)  Has the patient kept scheduled appointments due by today?: Yes (2/9/2024  1:40 PM)  Care Management Interventions: Advised patient to keep appointment (2/9/2024  1:40 PM)    Patient Teaching  Does the patient have access to their discharge instructions?: Yes (2/9/2024  1:40 PM)  Care Management Interventions: Reviewed instructions with patient (2/9/2024  1:40 PM)  What is the patient's perception of their health status since discharge?: Improving (2/9/2024  1:40 PM)  Is the patient/caregiver able to teach back the hierarchy of who to call/visit for symptoms/problems? PCP, Specialist, Home Health nurse, Urgent Care, ED, 911: Yes (2/9/2024  1:40  PM)    Issues Requiring Follow-Up  Continued follow up with geriatrics   Social work conversations on family dynamics      Beth Lezama LPN

## 2024-02-11 LAB
ATRIAL RATE: 85 BPM
P AXIS: 82 DEGREES
P OFFSET: 183 MS
P ONSET: 126 MS
PR INTERVAL: 190 MS
Q ONSET: 221 MS
QRS COUNT: 14 BEATS
QRS DURATION: 106 MS
QT INTERVAL: 378 MS
QTC CALCULATION(BAZETT): 449 MS
QTC FREDERICIA: 424 MS
R AXIS: -68 DEGREES
T AXIS: 88 DEGREES
T OFFSET: 410 MS
VENTRICULAR RATE: 85 BPM

## 2024-02-12 NOTE — PROGRESS NOTES
"Subjective   Patient ID: Grace Ahn is a 89 y.o. female who presents for Hospital Follow-up.    HPI   The patient reports that she went into the ED 8 days ago for a syncopal episode and was discharged 5 days ago. She denies any weakness in her legs and lightheadedness. She is on Prolia for her osteoporosis and uses Albuterol as needed for wheezing. She complains of a rash in the area of tape.    Her daughter was concerned about her medications in the hospital. Patient is doing well and has no confusion today. She understands she needs to be well hydrated.     Review of Systems  Constitutional: No fever or chills  Cardiovascular: no chest pain, no palpitations and no syncope.   Respiratory: no cough, no shortness of breath during exertion and no shortness of breath at rest.   Gastrointestinal: no abdominal pain, no nausea and no vomiting.  Neuro: No Headache, no dizziness  Skin: + rash.    Objective   /54   Pulse 63   Ht 1.575 m (5' 2\")   Wt 53.1 kg (117 lb)   BMI 21.40 kg/m²     Physical Exam  Constitutional: Alert and in no acute distress. Well developed, well nourished  Head and Face: Head and face: Normal.    Cardiovascular: Heart rate and rhythm were normal, normal S1 and S2. No peripheral edema.   Pulmonary: No respiratory distress. Clear bilateral breath sounds.  Musculoskeletal: Gait and station: Normal. Muscle strength/tone: Normal.   Skin: Normal skin color and pigmentation, normal skin turgor, + rash.    Psychiatric: Judgment and insight: Intact. Mood and affect: Normal.    Lab Results   Component Value Date    WBC 6.7 02/08/2024    HGB 11.7 (L) 02/08/2024    HCT 35.1 (L) 02/08/2024     02/08/2024    CHOL 183 06/14/2023    TRIG 64 06/14/2023    HDL 61.8 06/14/2023    ALT 9 02/05/2024    AST 18 02/05/2024     (L) 02/08/2024    K 4.0 02/08/2024     02/08/2024    CREATININE 0.65 02/08/2024    BUN 17 02/08/2024    CO2 24 02/08/2024    TSH 2.61 01/16/2024    INR 1.2 (H) " 01/21/2020    HGBA1C 5.8 (H) 01/16/2024       Electrocardiogram, 12-lead PRN ACS symptoms  Normal sinus rhythm  Left axis deviation  Septal infarct (cited on or before 05-FEB-2024)  Inferior infarct , age undetermined  Abnormal ECG  When compared with ECG of 05-FEB-2024 08:52,  Questionable change in initial forces of Anterior leads  Confirmed by Raj Slater ([id number]) on 2/11/2024 3:48:19 PM      Assessment/Plan   Diagnoses and all orders for this visit:  Rash  -     triamcinolone (Kenalog) 0.1 % cream; Apply topically 2 times a day. Apply to affected area 1-2 times daily as needed.  -     mupirocin (Bactroban) 2 % cream; Apply topically 3 times a day for 10 days. apply to affected area  Osteoporosis, unspecified osteoporosis type, unspecified pathological fracture presence  -     Follow Up In [facility] - PCP - Medicare Annual        Dear Grace Ahn     It was my pleasure to take care of you today in the office. Below are the things we discussed today:    1. Osteoporosis: Continue Prolia.     2. Rash: Use triamcinolone cream twice daily for 2 weeks.    3. Wheezing: Use Albuterol as needed.    4. Syncopal episode: Likely from dehydration. Advised her to maintain proper oral hydration.    5. Abrasion: Use Bactroban.    Your yearly Physical is due in: May 2024   When you call the office for your yearly Physical, please ask them to inform me to order your blood work, so that you can get the fasting blood work before your appointment and we can discuss the results at your physical.      Please call me if any questions arise from now until your next visit. I will call you after I am done seeing patients. A Doctor is always available by phone when the office is closed. Please feel free to call for help with any problem that you feel shouldn't wait until the office re-opens.     Scribe Attestation  By signing my name below, ITheodora Scribe   attest that this documentation has been prepared under  the direction and in the presence of Naima Quiñonez MD.

## 2024-02-13 ENCOUNTER — OFFICE VISIT (OUTPATIENT)
Dept: PRIMARY CARE | Facility: CLINIC | Age: 89
End: 2024-02-13
Payer: MEDICARE

## 2024-02-13 VITALS
SYSTOLIC BLOOD PRESSURE: 150 MMHG | HEIGHT: 62 IN | DIASTOLIC BLOOD PRESSURE: 54 MMHG | BODY MASS INDEX: 21.53 KG/M2 | HEART RATE: 63 BPM | WEIGHT: 117 LBS

## 2024-02-13 DIAGNOSIS — R21 RASH: Primary | ICD-10-CM

## 2024-02-13 DIAGNOSIS — M81.0 OSTEOPOROSIS, UNSPECIFIED OSTEOPOROSIS TYPE, UNSPECIFIED PATHOLOGICAL FRACTURE PRESENCE: ICD-10-CM

## 2024-02-13 PROCEDURE — 1159F MED LIST DOCD IN RCRD: CPT | Performed by: FAMILY MEDICINE

## 2024-02-13 PROCEDURE — 1036F TOBACCO NON-USER: CPT | Performed by: FAMILY MEDICINE

## 2024-02-13 PROCEDURE — 99496 TRANSJ CARE MGMT HIGH F2F 7D: CPT | Performed by: FAMILY MEDICINE

## 2024-02-13 PROCEDURE — 1160F RVW MEDS BY RX/DR IN RCRD: CPT | Performed by: FAMILY MEDICINE

## 2024-02-13 PROCEDURE — 1126F AMNT PAIN NOTED NONE PRSNT: CPT | Performed by: FAMILY MEDICINE

## 2024-02-13 PROCEDURE — 1157F ADVNC CARE PLAN IN RCRD: CPT | Performed by: FAMILY MEDICINE

## 2024-02-13 RX ORDER — MUPIROCIN CALCIUM 20 MG/G
CREAM TOPICAL 3 TIMES DAILY
Qty: 15 G | Refills: 0 | Status: SHIPPED | OUTPATIENT
Start: 2024-02-13 | End: 2024-02-23

## 2024-02-13 RX ORDER — TRIAMCINOLONE ACETONIDE 1 MG/G
CREAM TOPICAL 2 TIMES DAILY
Qty: 15 G | Refills: 0 | Status: SHIPPED | OUTPATIENT
Start: 2024-02-13

## 2024-02-21 ENCOUNTER — PATIENT OUTREACH (OUTPATIENT)
Dept: PRIMARY CARE | Facility: CLINIC | Age: 89
End: 2024-02-21
Payer: MEDICARE

## 2024-02-27 ENCOUNTER — OFFICE VISIT (OUTPATIENT)
Dept: DERMATOLOGY | Facility: CLINIC | Age: 89
End: 2024-02-27
Payer: MEDICARE

## 2024-02-27 DIAGNOSIS — Z12.83 SCREENING EXAM FOR SKIN CANCER: ICD-10-CM

## 2024-02-27 DIAGNOSIS — L57.8 ACTINIC SKIN DAMAGE: ICD-10-CM

## 2024-02-27 DIAGNOSIS — L82.1 SEBORRHEIC KERATOSIS: ICD-10-CM

## 2024-02-27 DIAGNOSIS — L30.9 LIP LICKING DERMATITIS: ICD-10-CM

## 2024-02-27 DIAGNOSIS — L82.0 INFLAMED SEBORRHEIC KERATOSIS: ICD-10-CM

## 2024-02-27 DIAGNOSIS — L81.4 LENTIGO: ICD-10-CM

## 2024-02-27 DIAGNOSIS — D22.9 MULTIPLE BENIGN NEVI: Primary | ICD-10-CM

## 2024-02-27 DIAGNOSIS — L28.0 LICHEN SIMPLEX CHRONICUS: ICD-10-CM

## 2024-02-27 PROCEDURE — 1159F MED LIST DOCD IN RCRD: CPT | Performed by: DERMATOLOGY

## 2024-02-27 PROCEDURE — 99213 OFFICE O/P EST LOW 20 MIN: CPT | Performed by: DERMATOLOGY

## 2024-02-27 PROCEDURE — 1036F TOBACCO NON-USER: CPT | Performed by: DERMATOLOGY

## 2024-02-27 PROCEDURE — 1126F AMNT PAIN NOTED NONE PRSNT: CPT | Performed by: DERMATOLOGY

## 2024-02-27 PROCEDURE — 1160F RVW MEDS BY RX/DR IN RCRD: CPT | Performed by: DERMATOLOGY

## 2024-02-27 PROCEDURE — 1157F ADVNC CARE PLAN IN RCRD: CPT | Performed by: DERMATOLOGY

## 2024-02-27 ASSESSMENT — DERMATOLOGY PATIENT ASSESSMENT
HAVE YOU HAD OR DO YOU HAVE VASCULAR DISEASE: NO
ARE YOU ON BIRTH CONTROL: NO
DO YOU HAVE IRREGULAR MENSTRUAL CYCLES: NO
DO YOU USE A TANNING BED: NO
HAVE YOU HAD OR DO YOU HAVE A STAPH INFECTION: NO
ARE YOU TRYING TO GET PREGNANT: NO
DO YOU HAVE ANY NEW OR CHANGING LESIONS: NO
DO YOU USE SUNSCREEN: OCCASIONALLY
ARE YOU AN ORGAN TRANSPLANT RECIPIENT: NO

## 2024-02-27 ASSESSMENT — DERMATOLOGY QUALITY OF LIFE (QOL) ASSESSMENT
RATE HOW BOTHERED YOU ARE BY SYMPTOMS OF YOUR SKIN PROBLEM (EG, ITCHING, STINGING BURNING, HURTING OR SKIN IRRITATION): 0 - NEVER BOTHERED
DATE THE QUALITY-OF-LIFE ASSESSMENT WAS COMPLETED: 66897
RATE HOW EMOTIONALLY BOTHERED YOU ARE BY YOUR SKIN PROBLEM (FOR EXAMPLE, WORRY, EMBARRASSMENT, FRUSTRATION): 0 - NEVER BOTHERED
ARE THERE EXCLUSIONS OR EXCEPTIONS FOR THE QUALITY OF LIFE ASSESSMENT: NO
RATE HOW BOTHERED YOU ARE BY EFFECTS OF YOUR SKIN PROBLEMS ON YOUR ACTIVITIES (EG, GOING OUT, ACCOMPLISHING WHAT YOU WANT, WORK ACTIVITIES OR YOUR RELATIONSHIPS WITH OTHERS): 0 - NEVER BOTHERED

## 2024-02-27 ASSESSMENT — ITCH NUMERIC RATING SCALE: HOW SEVERE IS YOUR ITCHING?: 0

## 2024-02-27 ASSESSMENT — PATIENT GLOBAL ASSESSMENT (PGA): PATIENT GLOBAL ASSESSMENT: PATIENT GLOBAL ASSESSMENT:  1 - CLEAR

## 2024-02-27 NOTE — PROGRESS NOTES
Subjective     Grace Ahn is a 89 y.o. female who presents for the following: Skin Check.     Review of Systems:  No other skin or systemic complaints other than what is documented elsewhere in the note.    The following portions of the chart were reviewed this encounter and updated as appropriate:         Skin Cancer History  No skin cancer on file.      Specialty Problems          Dermatology Problems    AK (actinic keratosis)     Formatting of this note might be different from the original. To back, face, scalp Formatting of this note might be different from the original. Below nose,upper lip,left shoulder         Generalized skin eruption due to drugs and medicaments taken internally    Irritant contact dermatitis due to fecal, urinary or dual incontinence    Lichen simplex chronicus    Personal history of other malignant neoplasm of skin     Lesion 1: Squamous Cell Carcinoma. Year Diagnosed: 2014. November. Location: Forehead. Treatment(s): Staged Excision. Excision performed by Dr. Keshawn Schultz (Plastics) in [place]     Lesion 2: Basal Cell Carcinoma. Year Diagnosed: 2014. January. Location: Left Lower Lip. Treatment(s): Staged Excision. 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    A full examination was performed including scalp, head, eyes, ears, nose, lips, neck, chest, axillae, abdomen, back, buttocks, bilateral upper extremities, bilateral lower extremities, hands, feet, fingers, toes, fingernails, and toenails. All findings within normal limits unless otherwise noted below.    Assessment/Plan   1. Multiple benign nevi  Brown and tan macules and papules with reassuring  findings on dermoscopy    -These lesions have benign, reassuring patterns on dermoscopy  -Recommend continued self observation, and to contact the office if any changes in nevi are noticed    2. Screening exam for skin cancer    Full body skin exam  -No lesions concerning for malignancy on the remainder the skin exam today   - The ugly duckling sign was discussed. Monitor for any skin lesions that are different in color, shape, or size than others on body  -Sun protection was discussed. Recommend SPF 30+, hats with brims, sun protective clothing, and avoiding sun exposure between 10 AM and 2 PM whenever possible  -Recommend regular skin exams or sooner if new or changing lesions       Related Procedures  Follow Up In Dermatology - Established Patient  Follow Up In Dermatology - Established Patient    3. Lentigo  Tan macules    -Benign appearing on exam  -Reassurance, recommend observation    4. Seborrheic keratosis  Stuck on, waxy macule(s)/papule(s)/plaque(s) with comedo-like openings and milia like cysts    -Discussed the nature of the diagnosis  -Reassurance, recommend continued observation    5. Actinic skin damage  Background of photodamage with hyper- and hypo-pigmented macules on the skin    Actinic keratoses treated at last visit resolved    6. Inflamed seborrheic keratosis (4)  Neck - Posterior, Right Upper Back (3)  Stuck-on, waxy macule(s)/papule(s)/plaque(s) with comedo-like openings and milia-like cysts with surrounding erythema and crusting    - declines cryotherapy today  - use triamcinolone as needed    7. Lichen simplex chronicus  Pubic  Light pink erythema, no thickening of skin    - bx 10/2023 with Dr. Lentz consistent with lichen simplex chronicus  - bland emollients and premarin; happy with this level of control    8. Lip licking dermatitis  Lips  Mild erythema    Using aquaphor  Switch to CeraVe lip ointment which is lanolin free        Follow up 4 months Full Skin Exam, patient prefers frequent  follow up

## 2024-03-08 ENCOUNTER — PATIENT OUTREACH (OUTPATIENT)
Dept: PRIMARY CARE | Facility: CLINIC | Age: 89
End: 2024-03-08
Payer: MEDICARE

## 2024-04-09 ENCOUNTER — APPOINTMENT (OUTPATIENT)
Dept: GERIATRIC MEDICINE | Facility: CLINIC | Age: 89
End: 2024-04-09
Payer: MEDICARE

## 2024-04-22 ENCOUNTER — APPOINTMENT (OUTPATIENT)
Dept: RADIOLOGY | Facility: CLINIC | Age: 89
End: 2024-04-22
Payer: MEDICARE

## 2024-05-01 ENCOUNTER — LAB (OUTPATIENT)
Dept: LAB | Facility: LAB | Age: 89
End: 2024-05-01
Payer: MEDICARE

## 2024-05-01 DIAGNOSIS — M81.0 AGE-RELATED OSTEOPOROSIS WITHOUT CURRENT PATHOLOGICAL FRACTURE: ICD-10-CM

## 2024-05-01 LAB
ALBUMIN SERPL BCP-MCNC: 3.8 G/DL (ref 3.4–5)
ALP SERPL-CCNC: 60 U/L (ref 33–136)
ALT SERPL W P-5'-P-CCNC: 11 U/L (ref 7–45)
ANION GAP SERPL CALC-SCNC: 15 MMOL/L (ref 10–20)
AST SERPL W P-5'-P-CCNC: 19 U/L (ref 9–39)
BILIRUB SERPL-MCNC: 0.5 MG/DL (ref 0–1.2)
BUN SERPL-MCNC: 25 MG/DL (ref 6–23)
CALCIUM SERPL-MCNC: 9.7 MG/DL (ref 8.6–10.6)
CHLORIDE SERPL-SCNC: 101 MMOL/L (ref 98–107)
CO2 SERPL-SCNC: 28 MMOL/L (ref 21–32)
CREAT SERPL-MCNC: 0.83 MG/DL (ref 0.5–1.05)
EGFRCR SERPLBLD CKD-EPI 2021: 65 ML/MIN/1.73M*2
GLUCOSE SERPL-MCNC: 85 MG/DL (ref 74–99)
POTASSIUM SERPL-SCNC: 4.8 MMOL/L (ref 3.5–5.3)
PROT SERPL-MCNC: 7.2 G/DL (ref 6.4–8.2)
SODIUM SERPL-SCNC: 139 MMOL/L (ref 136–145)

## 2024-05-01 PROCEDURE — 36415 COLL VENOUS BLD VENIPUNCTURE: CPT

## 2024-05-01 PROCEDURE — 80053 COMPREHEN METABOLIC PANEL: CPT

## 2024-05-07 ENCOUNTER — OFFICE VISIT (OUTPATIENT)
Dept: DERMATOLOGY | Facility: CLINIC | Age: 89
End: 2024-05-07
Payer: MEDICARE

## 2024-05-07 ENCOUNTER — PATIENT OUTREACH (OUTPATIENT)
Dept: PRIMARY CARE | Facility: CLINIC | Age: 89
End: 2024-05-07
Payer: MEDICARE

## 2024-05-07 DIAGNOSIS — K05.10 DESQUAMATIVE GINGIVITIS: Primary | ICD-10-CM

## 2024-05-07 PROCEDURE — 1160F RVW MEDS BY RX/DR IN RCRD: CPT | Performed by: DERMATOLOGY

## 2024-05-07 PROCEDURE — 87070 CULTURE OTHR SPECIMN AEROBIC: CPT | Performed by: DERMATOLOGY

## 2024-05-07 PROCEDURE — 1157F ADVNC CARE PLAN IN RCRD: CPT | Performed by: DERMATOLOGY

## 2024-05-07 PROCEDURE — 99213 OFFICE O/P EST LOW 20 MIN: CPT | Performed by: DERMATOLOGY

## 2024-05-07 PROCEDURE — 87102 FUNGUS ISOLATION CULTURE: CPT | Performed by: DERMATOLOGY

## 2024-05-07 PROCEDURE — 87529 HSV DNA AMP PROBE: CPT | Performed by: DERMATOLOGY

## 2024-05-07 PROCEDURE — 1159F MED LIST DOCD IN RCRD: CPT | Performed by: DERMATOLOGY

## 2024-05-09 LAB
HSV1 DNA SKIN QL NAA+PROBE: NOT DETECTED
HSV2 DNA SKIN QL NAA+PROBE: NOT DETECTED

## 2024-05-09 RX ORDER — CLOTRIMAZOLE 10 MG/1
10 LOZENGE ORAL; TOPICAL
Qty: 70 TROCHE | Refills: 1 | Status: SHIPPED | OUTPATIENT
Start: 2024-05-09 | End: 2024-05-19

## 2024-05-09 NOTE — PROGRESS NOTES
Subjective     Grace Ahn is a 89 y.o. female who presents for the following: Mouth Lesions (Painful sore on the upper inner lip x 1 week).     Review of Systems:  No other skin or systemic complaints other than what is documented elsewhere in the note.    The following portions of the chart were reviewed this encounter and updated as appropriate:   Tobacco  Allergies  Meds  Problems  Med Hx  Surg Hx         Skin Cancer History  No skin cancer on file.      Specialty Problems          Dermatology Problems    AK (actinic keratosis)     Formatting of this note might be different from the original. To back, face, scalp Formatting of this note might be different from the original. Below nose,upper lip,left shoulder         Generalized skin eruption due to drugs and medicaments taken internally    Irritant contact dermatitis due to fecal, urinary or dual incontinence    Lichen simplex chronicus    Personal history of other malignant neoplasm of skin     Lesion 1: Squamous Cell Carcinoma. Year Diagnosed: 2014. November. Location: Forehead. Treatment(s): Staged Excision. Excision performed by Dr. Keshawn Schultz (Plastics) in [place]     Lesion 2: Basal Cell Carcinoma. Year Diagnosed: 2014. January. Location: Left Lower Lip. Treatment(s): Staged Excision. 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    A focused skin examination was performed. All findings within normal limits unless otherwise noted below.    Assessment/Plan   1. Desquamative gingivitis (4)  Left Labial Mucosa of the Upper Lip, Left Maxillary Gingiva, Right Labial Mucosa of the Upper Lip, Right Maxillary Gingiva  White  non-adherent plaques loosely on gingiva and mucosal lip of upper    Swabs today to evaluate for thrush and HSV    Also consider immunobullous disease or other cause of desquamative gingivitis    She says it is improving    Send clotrimazole troches in case worsening, other treatments pending results    Specimen A - Tissue/Wound Culture/Smear      Specimen B - HSV PCR, Skin/Mucosa      Specimen C - Fungal Culture/Smear      Related Medications  clotrimazole (Mycelex) 10 mg alvino  Take 1 tablet (10 mg) by mouth 5 times a day for 10 days.

## 2024-05-10 ENCOUNTER — INFUSION (OUTPATIENT)
Dept: INFUSION THERAPY | Facility: CLINIC | Age: 89
End: 2024-05-10
Payer: MEDICARE

## 2024-05-10 VITALS
HEART RATE: 69 BPM | BODY MASS INDEX: 21.61 KG/M2 | WEIGHT: 118.17 LBS | SYSTOLIC BLOOD PRESSURE: 130 MMHG | RESPIRATION RATE: 16 BRPM | DIASTOLIC BLOOD PRESSURE: 76 MMHG | TEMPERATURE: 97.2 F | OXYGEN SATURATION: 95 %

## 2024-05-10 DIAGNOSIS — M81.0 AGE-RELATED OSTEOPOROSIS WITHOUT CURRENT PATHOLOGICAL FRACTURE: ICD-10-CM

## 2024-05-10 LAB
FUNGUS SPEC CULT: ABNORMAL
FUNGUS SPEC FUNGUS STN: ABNORMAL

## 2024-05-10 PROCEDURE — 96372 THER/PROPH/DIAG INJ SC/IM: CPT | Performed by: NURSE PRACTITIONER

## 2024-05-10 RX ORDER — DIPHENHYDRAMINE HYDROCHLORIDE 50 MG/ML
50 INJECTION INTRAMUSCULAR; INTRAVENOUS AS NEEDED
OUTPATIENT
Start: 2024-10-28

## 2024-05-10 RX ORDER — FAMOTIDINE 10 MG/ML
20 INJECTION INTRAVENOUS ONCE AS NEEDED
OUTPATIENT
Start: 2024-10-28

## 2024-05-10 RX ORDER — ALBUTEROL SULFATE 0.83 MG/ML
3 SOLUTION RESPIRATORY (INHALATION) AS NEEDED
OUTPATIENT
Start: 2024-10-28

## 2024-05-10 RX ORDER — EPINEPHRINE 0.3 MG/.3ML
0.3 INJECTION SUBCUTANEOUS EVERY 5 MIN PRN
OUTPATIENT
Start: 2024-10-28

## 2024-05-10 ASSESSMENT — ENCOUNTER SYMPTOMS
LEG SWELLING: 0
ABDOMINAL PAIN: 0
DEPRESSION: 0
ARTHRALGIAS: 0
LIGHT-HEADEDNESS: 0
BLOOD IN STOOL: 0
WOUND: 0
HEADACHES: 0
CONSTIPATION: 0
NAUSEA: 0
CHILLS: 0
FREQUENCY: 0
SORE THROAT: 0
SHORTNESS OF BREATH: 0
EXTREMITY WEAKNESS: 0
MYALGIAS: 0
DIZZINESS: 0
COUGH: 0
NERVOUS/ANXIOUS: 0
NUMBNESS: 0
WHEEZING: 0
HEMATURIA: 0
FATIGUE: 0
VOICE CHANGE: 0
APPETITE CHANGE: 0
UNEXPECTED WEIGHT CHANGE: 0
DYSURIA: 0
FEVER: 0
DIARRHEA: 0
TROUBLE SWALLOWING: 0
PALPITATIONS: 0
VOMITING: 0
EYE PROBLEMS: 0

## 2024-05-10 NOTE — PROGRESS NOTES
[facility]   Infusion Clinic Note   Date: May 10, 2024   Name: Grace Ahn  : 1929   MRN: [id number]         Reason for Visit: Injections and Follow-up (PT HERE FOR PROLIA 60 MG INJECTION/NEXT APPT: 180 DAYS)         Visit Type: INJECTION       Ordered By: DR MACK      Accompanied by:Child/Children      Diagnosis: Age-related osteoporosis without current pathological fracture       Allergies:   Allergies as of 05/10/2024 - Reviewed 05/10/2024   Allergen Reaction Noted    Bacitracin-polymyxin b Unknown 10/05/2010    Latex Other 2023    Penicillins Hives 2008    Adhesive tape-silicones Rash 10/20/2022    Bacitracin Rash 2020    Cephalosporins Hives and Rash 10/20/2022    Chlorine Rash 10/20/2022    Ciprofloxacin Rash and Unknown 10/20/2022    Doxycycline Rash 10/20/2022    Neomycin Rash 10/20/2022    Omeprazole Rash 2023    Pantoprazole Rash 10/20/2022    Sulfamethoxazole Rash 2023         Current Medications has a current medication list which includes the following prescription(s): acetaminophen, proair respiclick, biotin, calcium carbonate, cholecalciferol, clotrimazole, denosumab, one-a-day women's 50 plus, premarin, and triamcinolone, and the following Facility-Administered Medications: denosumab.       Vitals:   Vitals:    05/10/24 1345   BP: 130/76   Pulse: 69   Resp: 16   Temp: 36.2 °C (97.2 °F)   SpO2: 95%   Weight: 53.6 kg (118 lb 2.7 oz)             Infusion Pre-procedure Checklist:   - Allergies reviewed: yes   - Medications reviewed: yes       - Previous reaction to current treatment: no      Assess patient for the concerns below. Document provider notification as appropriate.  - Active or recent infection with/without current antibiotic use: no  - Recent or planned invasive dental work: no  - Recent or planned surgeries: no  - Recently received or plans to receive vaccinations: no  - Has treatment related toxicities: no  - Is pregnant:   n/a      Pain: 0   - Is the pain different from normal: n/a   - Is the pain tolerable: n/a   - Is your Doctor aware:  n/a      Labs: N/A         Fall Risk Screening: Christine Fall Risk  History of Falling, Immediate or Within 3 Months: No  Secondary Diagnosis: No  Ambulatory Aid: Walks without aid/bedrest/nurse assist  Intravenous Therapy/Heparin Lock: No  Gait/Transferring: Normal/bedrest/immobile  Mental Status: Oriented to own ability  Christine Fall Risk Score: 0       Review Of Systems:  Review of Systems   Constitutional:  Negative for appetite change, chills, fatigue, fever and unexpected weight change.   HENT:   Negative for hearing loss, mouth sores, sore throat, tinnitus, trouble swallowing and voice change.    Eyes:  Negative for eye problems.   Respiratory:  Negative for cough, shortness of breath and wheezing.    Cardiovascular:  Negative for chest pain, leg swelling and palpitations.   Gastrointestinal:  Negative for abdominal pain, blood in stool, constipation, diarrhea, nausea and vomiting.   Genitourinary:  Negative for dysuria, frequency and hematuria.    Musculoskeletal:  Negative for arthralgias and myalgias.   Skin:  Negative for itching, rash and wound.   Neurological:  Negative for dizziness, extremity weakness, headaches, light-headedness and numbness.   Psychiatric/Behavioral:  Negative for depression. The patient is not nervous/anxious.          Infusion Readiness:   - Assessment Concerns Related to Infusion: No  - Provider notified: n/a      Document Below Only If Indicated:   New Patient Education:    N/A (returning patient for continuation of therapy. Ongoing education provided as needed.)        Treatment Conditions & Drug Specific Questions:    Denosumab  (PROLIA. XGEVA)    (Unless otherwise specified on patient specific therapy plan):     TREATMENT CONDITIONS:  Unless otherwise specified on patient specific therapy plan HOLD and notify provider prior to proceeding with today's injection if  "patients:  o Calcium is LESS THAN 8.6 mg/dL OR  Ionized Calcium LESS THAN 1.1 mmol/L  o Recent or planned invasive dental procedure (within 4 weeks)    Lab Results   Component Value Date    CALCIUM 9.7 05/01/2024    PHOS 4.7 09/29/2022      No results found for: \"CAION\"    Labs reviewed and patient meets treatment conditions? Yes    DRUG SPECIFIC QUESTIONS:  Is the patient taking calcium and vitamin D? Yes  (Recommended)    Pt Instructed on following risks: (1) hypocalcemia, (2) osteonecrosis of the jaw, (3) atypical femoral fractures, (4) serious infections, and (5) dermatologic reactions?  Yes      REMINDER:  PREGNANCY CATEGORY X DRUG. OBTAIN NEGTATIVE PREGNANCY TEST PRIOR TO FIRST INFUSION FOR WOMEN OF CHILDBEARING ABILITY   REMS DRUG    Recommended Vitals/Observation:  Vitals: Obtain vitals prior to injection.  Observation: Patient may leave immediately following injection.        Weight Based Drug Calculations:    WEIGHT BASED DRUGS: NOT APPLICABLE / FLAT DOSE          Initiated By: Leta Cook RN   Time: 2:01 PM     We administered denosumab.      "

## 2024-05-10 NOTE — PATIENT INSTRUCTIONS
Today :We administered denosumab.     For:   1. Age-related osteoporosis without current pathological fracture         Your next appointment is due in:  180 DAYS        Please read the  Medication Guide that was given to you and reviewed during todays visit.     (Tell all doctors including dentists that you are taking this medication)     Go to the emergency room or call 911 if:  -You have signs of allergic reaction:   -Rash, hives, itching.   -Swollen, blistered, peeling skin.   -Swelling of face, lips, mouth, tongue or throat.   -Tightness of chest, trouble breathing, swallowing or talking     Call your doctor:  - If IV / injection site gets red, warm, swollen, itchy or leaks fluid or pus.     (Leave dressing on your IV site for at least 2 hours and keep area clean and dry  - If you get sick or have symptoms of infection or are not feeling well for any reason.    (Wash your hands often, stay away from people who are sick)  - If you have side effects from your medication that do not go away or are bothersome.     (Refer to the teaching your nurse gave you for side effects to call your doctor about)    - Common side effects may include:  stuffy nose, headache, feeling tired, muscle aches, upset stomach  - Before receiving any vaccines     - Call the [facility] at   If:  - You get sick, are on antibiotics, have had a recent vaccine, have surgery or dental work and your doctor wants your visit rescheduled.  - You need to cancel and reschedule your visit for any reason. Call at least 2 days before your visit if you need to cancel.   - Your insurance changes before your next visit.    (We will need to get approval from your new insurance. This can take up to two weeks.)     The [facility] is opened Monday thru Friday. We are closed on weekends and holidays.   Voice mail will take your call if the center is closed. If you leave a message please allow 24 hours for a call back during  weekdays. If you leave a message on a weekend/holiday, we will call you back the next business day.

## 2024-05-11 LAB
BACTERIA SPEC CULT: NORMAL
GRAM STN SPEC: NORMAL
GRAM STN SPEC: NORMAL
LABORATORY COMMENT REPORT: NORMAL

## 2024-05-14 ENCOUNTER — PATIENT MESSAGE (OUTPATIENT)
Dept: PRIMARY CARE | Facility: CLINIC | Age: 89
End: 2024-05-14

## 2024-05-14 ENCOUNTER — APPOINTMENT (OUTPATIENT)
Dept: PRIMARY CARE | Facility: CLINIC | Age: 89
End: 2024-05-14
Payer: MEDICARE

## 2024-05-14 DIAGNOSIS — R21 RASH: Primary | ICD-10-CM

## 2024-05-15 RX ORDER — MUPIROCIN 20 MG/G
OINTMENT TOPICAL 3 TIMES DAILY
Qty: 22 G | Refills: 0 | Status: SHIPPED | OUTPATIENT
Start: 2024-05-15 | End: 2024-05-25

## 2024-06-13 NOTE — PROGRESS NOTES
"Subjective   Patient ID: Grace Ahn is a 89 y.o. female who presents for Medicare Annual Wellness Visit Subsequent.      HPI   The patient reports that she is on Prolia for osteoporosis and she denies having any side effects from it.      Review of Systems  Constitutional: No fever or chills, No Night Sweats  Eyes: No Blurry Vision or Eye sight problems  ENT: No Nasal Discharge, Hoarseness, sore throat  Cardiovascular: no chest pain, no palpitations and no syncope.   Respiratory: no cough, no shortness of breath during exertion and no shortness of breath at rest.   Gastrointestinal: no abdominal pain, no nausea and no vomiting.   : No vaginal discharge, burning with urination, no blood in urine or stools  Skin: No Skin rashes or Lesions  Neuro: No Headache, no dizziness or Numbness or tingling  Psych: No Anxiety, depression or sleeping problems  Heme: No Easy bleeding or brusing.     Objective   /54   Pulse 76   Ht 1.575 m (5' 2\")   Wt 54.4 kg (120 lb)   SpO2 96%   BMI 21.95 kg/m²     Physical Exam  Constitutional: Alert and in no acute distress. Well developed, well nourished.   Head and Face: Head and face: Normal.    Eyes: Normal external exam.   Hearing: Normal.    Cardiovascular: Heart rate and rhythm were normal, normal S1 and S2. Pedal pulses: Normal. No peripheral edema.   Pulmonary: No respiratory distress. Clear bilateral breath sounds.   Musculoskeletal: No joint swelling seen, normal movements of all extremities. Range of motion: Normal.  Muscle strength/tone: Normal.    Skin: Normal skin color and pigmentation, normal skin turgor, and no rash.    Psychiatric: Judgment and insight: Intact. Mood and affect: Normal.      Lab Results   Component Value Date    WBC 6.7 02/08/2024    HGB 11.7 (L) 02/08/2024    HCT 35.1 (L) 02/08/2024     02/08/2024    CHOL 183 06/14/2023    TRIG 64 06/14/2023    HDL 61.8 06/14/2023    ALT 11 05/01/2024    AST 19 05/01/2024     05/01/2024    K 4.8 " 05/01/2024     05/01/2024    CREATININE 0.83 05/01/2024    BUN 25 (H) 05/01/2024    CO2 28 05/01/2024    TSH 2.61 01/16/2024    INR 1.2 (H) 01/21/2020    HGBA1C 5.8 (H) 01/16/2024       ECG 12 lead  Normal sinus rhythm  Left anterior fascicular block  Minimal voltage criteria for LVH, may be normal variant ( Artemio product )  Anteroseptal infarct (cited on or before 05-FEB-2024)  Abnormal ECG  When compared with ECG of 08-FEB-2023 13:14,  Previous ECG has undetermined rhythm, needs review  See ED provider note for full interpretation and clinical correlation  Confirmed by Mayela Sofia ([id number]) on 2/7/2024 10:35:39 AM      Assessment/Plan   Diagnoses and all orders for this visit:  Medicare annual wellness visit, subsequent  Age-related osteoporosis without current pathological fracture  -     Follow Up In [facility] - PCP - Established; Future  -     Comprehensive Metabolic Panel; Future  Moderate mixed hyperlipidemia not requiring statin therapy  -     Lipid Panel; Future  Elevated blood sugar  -     CBC; Future  -     Hemoglobin A1C; Future        Dear Grace Ahn     It was my pleasure to take care of you today in the office. Below are the things we discussed today:    1. 1. Immunizations: Yearly Flu shot is recommended. Up-to-date          a: COVID: Up-to-Date         b: Tetanus: Up-to-date         c: Shingrix: Please get from the pharmacy          d: Pneumovax: Up-to-date         e: Prevnar: Up-to-date        F. RSV: Please get from the pharmacy in fall     2. Blood Work: Ordered  3. Seen your dentist twice a year  4. Yearly Eye exam is recommended    5. BMI: Normal  6: Diet recommendations:   Eat Clean, Try to have as many home cooked meals as possible  Avoid processed foods which contain excess calories, sugar, and sodium.    7. Exercise recommendations:   150 minutes a week to maintain your weight     If you have to loose weight, you need a better diet and exercise plan.     8.  Supplements recommended:  a - Calcium 600 mg up to twice a day to get a total of 1200 mg. Each 8 oz of milk or yogurt or 1 oz of cheese, 1 Banana, 1 serving of green Leafy vegetable has about 300 mg of Calcium, so you may subtract that amount. Calcium citrate is the only acceptable supplement to take if you take an acid suppressing medication like Prilosec; otherwise Calcium carbonate is acceptable too (It can cause Constipation).   b - Vitamin D - 2000 IU daily     9. Please get your Living will / Advance directive completed if you do not have one already. Please make sure our office has a copy of the latest one.     10. Colonoscopy: Not indicated   11. Mammogram: Not indicated   12. PAP: Not indicated   13. DEXA: Up-to-date. Due Nov 2025   14: Skin Check: Please see Dermatology once a year for a Skin Check.     15. Osteoporosis: The patient is on Prolia.    16. Dehydration: Advised her to maintain proper oral hydration.     Follow up in 3 months.    Follow up in one year for a Physical. Please call the office before your Physical to see if you need blood work completed prior to your physical.     Please call me if any questions arise from now until your next visit. I will call you after I am done seeing patients. A Doctor is always available by phone when the office is closed. Please feel free to call for help with any problem that you feel shouldn't wait until the office re-opens.     Scribe Attestation  By signing my name below, ITheodora, Talya   attest that this documentation has been prepared under the direction and in the presence of Naima Quiñonez MD.

## 2024-06-17 ENCOUNTER — APPOINTMENT (OUTPATIENT)
Dept: PRIMARY CARE | Facility: CLINIC | Age: 89
End: 2024-06-17
Payer: MEDICARE

## 2024-06-17 VITALS
HEIGHT: 62 IN | OXYGEN SATURATION: 96 % | BODY MASS INDEX: 22.08 KG/M2 | HEART RATE: 76 BPM | WEIGHT: 120 LBS | DIASTOLIC BLOOD PRESSURE: 54 MMHG | SYSTOLIC BLOOD PRESSURE: 136 MMHG

## 2024-06-17 DIAGNOSIS — Z00.00 MEDICARE ANNUAL WELLNESS VISIT, SUBSEQUENT: Primary | ICD-10-CM

## 2024-06-17 DIAGNOSIS — M81.0 AGE-RELATED OSTEOPOROSIS WITHOUT CURRENT PATHOLOGICAL FRACTURE: ICD-10-CM

## 2024-06-17 DIAGNOSIS — R73.9 ELEVATED BLOOD SUGAR: ICD-10-CM

## 2024-06-17 DIAGNOSIS — E78.2 MODERATE MIXED HYPERLIPIDEMIA NOT REQUIRING STATIN THERAPY: ICD-10-CM

## 2024-06-17 PROBLEM — T24.112A: Status: RESOLVED | Noted: 2023-10-16 | Resolved: 2024-06-17

## 2024-06-17 PROBLEM — N76.0 VAGINITIS: Status: RESOLVED | Noted: 2023-10-16 | Resolved: 2024-06-17

## 2024-06-17 PROBLEM — R42 DIZZINESS: Status: RESOLVED | Noted: 2023-10-16 | Resolved: 2024-06-17

## 2024-06-17 PROBLEM — R26.2 DIFFICULTY WALKING: Status: RESOLVED | Noted: 2023-10-16 | Resolved: 2024-06-17

## 2024-06-17 PROBLEM — F43.0 STRESS REACTION: Status: RESOLVED | Noted: 2023-06-16 | Resolved: 2024-06-17

## 2024-06-17 PROBLEM — R05.1 ACUTE COUGH: Status: RESOLVED | Noted: 2024-02-05 | Resolved: 2024-06-17

## 2024-06-17 PROBLEM — R06.2 WHEEZING: Status: RESOLVED | Noted: 2023-10-16 | Resolved: 2024-06-17

## 2024-06-17 PROBLEM — R90.89 ABNORMAL FINDING ON MRI OF BRAIN: Status: RESOLVED | Noted: 2023-10-16 | Resolved: 2024-06-17

## 2024-06-17 PROBLEM — L97.521 NON-PRESSURE CHRONIC ULCER OF OTHER PART OF LEFT FOOT LIMITED TO BREAKDOWN OF SKIN (MULTI): Status: RESOLVED | Noted: 2023-10-16 | Resolved: 2024-06-17

## 2024-06-17 PROBLEM — L29.9 ITCHING: Status: RESOLVED | Noted: 2023-10-16 | Resolved: 2024-06-17

## 2024-06-17 PROCEDURE — G0439 PPPS, SUBSEQ VISIT: HCPCS | Performed by: FAMILY MEDICINE

## 2024-06-17 PROCEDURE — 99213 OFFICE O/P EST LOW 20 MIN: CPT | Performed by: FAMILY MEDICINE

## 2024-06-17 PROCEDURE — 1170F FXNL STATUS ASSESSED: CPT | Performed by: FAMILY MEDICINE

## 2024-06-17 PROCEDURE — 1036F TOBACCO NON-USER: CPT | Performed by: FAMILY MEDICINE

## 2024-06-17 PROCEDURE — 1159F MED LIST DOCD IN RCRD: CPT | Performed by: FAMILY MEDICINE

## 2024-06-17 PROCEDURE — 1160F RVW MEDS BY RX/DR IN RCRD: CPT | Performed by: FAMILY MEDICINE

## 2024-06-17 PROCEDURE — 1157F ADVNC CARE PLAN IN RCRD: CPT | Performed by: FAMILY MEDICINE

## 2024-06-17 PROCEDURE — 1123F ACP DISCUSS/DSCN MKR DOCD: CPT | Performed by: FAMILY MEDICINE

## 2024-06-17 ASSESSMENT — ACTIVITIES OF DAILY LIVING (ADL)
BATHING: INDEPENDENT
TAKING_MEDICATION: INDEPENDENT
GROCERY_SHOPPING: NEEDS ASSISTANCE
MANAGING_FINANCES: NEEDS ASSISTANCE
DOING_HOUSEWORK: NEEDS ASSISTANCE
DRESSING: INDEPENDENT

## 2024-06-17 ASSESSMENT — COLUMBIA-SUICIDE SEVERITY RATING SCALE - C-SSRS
1. IN THE PAST MONTH, HAVE YOU WISHED YOU WERE DEAD OR WISHED YOU COULD GO TO SLEEP AND NOT WAKE UP?: NO
2. HAVE YOU ACTUALLY HAD ANY THOUGHTS OF KILLING YOURSELF?: NO

## 2024-06-17 NOTE — PATIENT INSTRUCTIONS
Immunizations you need to get from the pharmacy: Shingrix, RSV    If you need labs done, please go to any  lab, no paperwork needed. If a Lipid panel is ordered, you will need to fast overnight, other blood work does not require fasting.   Lab in this building is in [address] (M-F 7:30-5:00pm and Sat 8-12pm)    Please call me if any questions arise from now until your next visit. I will call you after I am done seeing patients. A Doctor is always available by phone when the office is closed. Please feel free to call for help with any problem that you feel shouldn't wait until the office re-opens.

## 2024-06-22 ASSESSMENT — DERMATOLOGY QUALITY OF LIFE (QOL) ASSESSMENT
DATE THE QUALITY-OF-LIFE ASSESSMENT WAS COMPLETED: 66890
RATE HOW BOTHERED YOU ARE BY EFFECTS OF YOUR SKIN PROBLEMS ON YOUR ACTIVITIES (EG, GOING OUT, ACCOMPLISHING WHAT YOU WANT, WORK ACTIVITIES OR YOUR RELATIONSHIPS WITH OTHERS): 0 - NEVER BOTHERED
RATE HOW EMOTIONALLY BOTHERED YOU ARE BY YOUR SKIN PROBLEM (FOR EXAMPLE, WORRY, EMBARRASSMENT, FRUSTRATION): 0 - NEVER BOTHERED
RATE HOW BOTHERED YOU ARE BY EFFECTS OF YOUR SKIN PROBLEMS ON YOUR ACTIVITIES (EG, GOING OUT, ACCOMPLISHING WHAT YOU WANT, WORK ACTIVITIES OR YOUR RELATIONSHIPS WITH OTHERS): 0 - NEVER BOTHERED
WHAT SINGLE SKIN CONDITION LISTED BELOW IS THE PATIENT ANSWERING THE QUALITY-OF-LIFE ASSESSMENT QUESTIONS ABOUT: NONE OF THE ABOVE
RATE HOW BOTHERED YOU ARE BY SYMPTOMS OF YOUR SKIN PROBLEM (EG, ITCHING, STINGING BURNING, HURTING OR SKIN IRRITATION): 1
WHAT SINGLE SKIN CONDITION LISTED BELOW IS THE PATIENT ANSWERING THE QUALITY-OF-LIFE ASSESSMENT QUESTIONS ABOUT: NONE OF THE ABOVE
RATE HOW EMOTIONALLY BOTHERED YOU ARE BY YOUR SKIN PROBLEM (FOR EXAMPLE, WORRY, EMBARRASSMENT, FRUSTRATION): 0 - NEVER BOTHERED
RATE HOW BOTHERED YOU ARE BY SYMPTOMS OF YOUR SKIN PROBLEM (EG, ITCHING, STINGING BURNING, HURTING OR SKIN IRRITATION): 1

## 2024-06-22 ASSESSMENT — PATIENT GLOBAL ASSESSMENT (PGA): WHAT IS THE PGA: PATIENT GLOBAL ASSESSMENT:  1 - CLEAR

## 2024-06-25 ENCOUNTER — APPOINTMENT (OUTPATIENT)
Dept: DERMATOLOGY | Facility: CLINIC | Age: 89
End: 2024-06-25
Payer: MEDICARE

## 2024-06-25 DIAGNOSIS — Z85.828 PERSONAL HISTORY OF SKIN CANCER: ICD-10-CM

## 2024-06-25 DIAGNOSIS — D22.9 MULTIPLE BENIGN NEVI: Primary | ICD-10-CM

## 2024-06-25 DIAGNOSIS — L57.8 ACTINIC SKIN DAMAGE: ICD-10-CM

## 2024-06-25 DIAGNOSIS — Z12.83 SCREENING EXAM FOR SKIN CANCER: ICD-10-CM

## 2024-06-25 DIAGNOSIS — L81.4 LENTIGO: ICD-10-CM

## 2024-06-25 DIAGNOSIS — L82.1 SEBORRHEIC KERATOSIS: ICD-10-CM

## 2024-06-25 PROCEDURE — 99213 OFFICE O/P EST LOW 20 MIN: CPT | Performed by: DERMATOLOGY

## 2024-06-25 PROCEDURE — 1159F MED LIST DOCD IN RCRD: CPT | Performed by: DERMATOLOGY

## 2024-06-25 PROCEDURE — 1157F ADVNC CARE PLAN IN RCRD: CPT | Performed by: DERMATOLOGY

## 2024-06-25 PROCEDURE — 1160F RVW MEDS BY RX/DR IN RCRD: CPT | Performed by: DERMATOLOGY

## 2024-06-25 PROCEDURE — 1036F TOBACCO NON-USER: CPT | Performed by: DERMATOLOGY

## 2024-06-25 PROCEDURE — 1123F ACP DISCUSS/DSCN MKR DOCD: CPT | Performed by: DERMATOLOGY

## 2024-06-25 ASSESSMENT — DERMATOLOGY PATIENT ASSESSMENT
DO YOU HAVE ANY NEW OR CHANGING LESIONS: NO
ARE YOU TRYING TO GET PREGNANT: NO
ARE YOU AN ORGAN TRANSPLANT RECIPIENT: NO
DO YOU HAVE IRREGULAR MENSTRUAL CYCLES: NO
HAVE YOU HAD OR DO YOU HAVE VASCULAR DISEASE: NO
DO YOU USE SUNSCREEN: OCCASIONALLY
HAVE YOU HAD OR DO YOU HAVE A STAPH INFECTION: NO
DO YOU USE A TANNING BED: NO
ARE YOU ON BIRTH CONTROL: NO

## 2024-06-25 ASSESSMENT — DERMATOLOGY QUALITY OF LIFE (QOL) ASSESSMENT
DATE THE QUALITY-OF-LIFE ASSESSMENT WAS COMPLETED: 67016
RATE HOW BOTHERED YOU ARE BY SYMPTOMS OF YOUR SKIN PROBLEM (EG, ITCHING, STINGING BURNING, HURTING OR SKIN IRRITATION): 0 - NEVER BOTHERED
RATE HOW EMOTIONALLY BOTHERED YOU ARE BY YOUR SKIN PROBLEM (FOR EXAMPLE, WORRY, EMBARRASSMENT, FRUSTRATION): 0 - NEVER BOTHERED
RATE HOW BOTHERED YOU ARE BY EFFECTS OF YOUR SKIN PROBLEMS ON YOUR ACTIVITIES (EG, GOING OUT, ACCOMPLISHING WHAT YOU WANT, WORK ACTIVITIES OR YOUR RELATIONSHIPS WITH OTHERS): 0 - NEVER BOTHERED
ARE THERE EXCLUSIONS OR EXCEPTIONS FOR THE QUALITY OF LIFE ASSESSMENT: NO

## 2024-06-25 ASSESSMENT — PATIENT GLOBAL ASSESSMENT (PGA): PATIENT GLOBAL ASSESSMENT: PATIENT GLOBAL ASSESSMENT:  1 - CLEAR

## 2024-06-25 ASSESSMENT — ITCH NUMERIC RATING SCALE: HOW SEVERE IS YOUR ITCHING?: 0

## 2024-07-02 ENCOUNTER — PATIENT MESSAGE (OUTPATIENT)
Dept: PRIMARY CARE | Facility: CLINIC | Age: 89
End: 2024-07-02
Payer: MEDICARE

## 2024-07-02 DIAGNOSIS — R29.6 FREQUENT FALLS: Primary | ICD-10-CM

## 2024-07-07 NOTE — PROGRESS NOTES
"Subjective   Ms. Ahn is 89 y.o. year old female and here for f/u of steoporosis, vulvar lesions, non-pressure ulcer of the L foot, sleep apnea (as per dtr), and wheezing. Here with her daughter lori munoz (a physician).     Last visit 1/2024   Per pt discussion/summary:   \"Will hold off on memory medications such as donepezil or aricept as no improvement noted in 3 months time  Labwork to be done today  Spoke about lexapro- an antidepressant but patient does not want at this time and will discuss again next visit  Schedule for 3 month follow up\"    Hospitalized 2/5-2/8 at [facility] for syncope  - discharged on sodium chloride 1g TID  Thiamine BID   - premarin with new instructions  Discharge summary  \" Grace was admitted to hospital after syncopal episode.  She does have a history of autonomic disorders where she has had multiple syncopal episodes in the past.  This was potentially precipitated by some mild dehydration given that she did have an upper respiratory tract infection several days prior to admission and is not great at drinking fluids.  During her stay here she was rehydrated and did well.  She had orthostatic vital signs that were negative.  There were some difficulties with patient's family as there seems to be quite a bit of miscommunication or lack of communication between patient's daughters which has complicated patient's overall discharge plan.  The patient's POA has concerns about patient's capacity and so we had the psychiatry team evaluate patient.  Their recommendations are forthcoming.  However at this time she is otherwise hemodynamically stable and appropriate for discharge.  She and her family will need to continue to follow-up outpatient for concerns of competency/capacity as well as ongoing living situations if patient is deemed unsafe at her independent living facility.  This plan was discussed with her as well as her 2 daughters and son-in-law.  They are all in agreement.  All " "questions were answered.\"    Per NP note from 2/8/24 during a rapid response that was called for hypertension  \"I was called back to the patient room at ~01:45 to manage the patient's daughter's (Drea's) rude and inappropriate behavior toward staff. When I arrived, daughter Drea was berating the nursing staff who were outside of the room. When I entered the room, Drea began berating me and stating that she wanted to speak to a physician and that she was extremely distressed to get a call from her mother so early in the morning. I asked her if we could sit and calmly discuss the events of the rapid since I was the provider who responded to it. We sat and I attempted to relay the events prior to and during the rapid response, as well as the plan for continued follow up, to her and her  (via telephone) but she frequently interrupted me and continued to raise her voice. I asked if she had any concerns that I could address right now and she asked what physicians were in-house and if/when they would be notified. I informed her that I would be updating Dr. Engle our nocturnist and that he would follow her mother's care from now on and Dr. Varghese who will take over in the morning. She stated that she would like a physician to evaluate her mother tonight and I told her that I would communicate that to Dr. Engle. She then asked her  Dr. Ozzy Tavera to call Dr. Engle to come see her mother. At this point, I asked if she had any more questions for me, to which she continued to talk to her  on the phone, so I excused myself and left. \"     Saw PCP 2/13/24  \"   1. Osteoporosis: Continue Prolia.   2. Rash: Use triamcinolone cream twice daily for 2 weeks.  3. Wheezing: Use Albuterol as needed.  4. Syncopal episode: Likely from dehydration. Advised her to maintain proper oral hydration.  5. Abrasion: Use Bactroban.\"    Saw dr. Quiñonez for medicare wellness 6/17  \"Diagnoses and all orders for this " "visit:  Medicare annual wellness visit, subsequent  Age-related osteoporosis without current pathological fracture  -     Follow Up In [facility] - PCP - Established; Future  -     Comprehensive Metabolic Panel; Future  Moderate mixed hyperlipidemia not requiring statin therapy  -     Lipid Panel; Future  Elevated blood sugar  -     CBC; Future  -     Hemoglobin A1C; Future\"    HPI     Per patient and dtr  (obtained in addition due to cognitive impairment)  - per dtr, pt's other dtr in [place] has stopped calling pt daily   - the other dtr also stopped emailing the dtr  - doing so much better per dtr  - pt says she doesn't think that the other dtr stressed her out. The dtr says it does when the other dtr accuses this dtr of abusing the pt.   - the other dtr called aps on this dtr.   - dtr says pt had vasovagal. Was slightly dehydrated.   - while pt was inh hospital, the other dtr from [place] was demanding records on pt. Insisted that it would show something so that she could have her sister put in nursing home.   - the family conflict delayed   - dtr says while pt was in hospital, she was given iv hydralazine, unmonitored. \"It almost killed her\"  - Dr. Sierra from psych at [facility] recommended high dose thiamine 100mg BID. Didn;t start taking this.   - dtr concerned that she is not drinking enough fluid.  - had cereal and milk and fruit  - usually has soup for lunch  - bowels are moving ok  - does wear   - mood good per pt. Denies passive death wish. No worthlessness or guilt.   - not worrying more than she needs to.   - memory is good per pt  - rarely has pain   - weight stable    - almost fell on volleyball net. Caught herself. Pulled muscle in inner thigh    - has documented sleep apnea. But didn't like the cpap    -- pt says she gets up couple times to urinate. Says she sleeps ok    - does snore per dtr. Pt doesn';t know that she does     Home environment: lives at an [facility]"" at [facility]. Independent living "     - goes to chair volleyball 2-3x per week. Does exercise classes daily.   - goes to other programs  - [employer] every Saturday evening    - has 3 meals per day included. But usually does breakfast and sometimes lunch     Alcohol: denies  Smoking: denies     Is dependant or requires assistance in the following BADL: independent  Is dependant or requires assistance in the following Instrumental ADL: independent to all except transporation, requires her daughter for transportation and requires assistance with finance. Her son was doing finances after pt's  . And the son . And now dtr doing finances. On auto pay. Pt takes vitamins out of bottle. Denies forgetting to take  Not driving. Volutarily stopped 1 year ago  Prepares food for herself     History of Abuse/Neglect/exploitation: caregiver burden stress noted but no abuse noted.       Medications reviewed and reconciled.     Objective   /68   Pulse 70   Temp 35.7 °C (96.2 °F) (Tympanic)   Resp 16   Wt 54.2 kg (119 lb 6.4 oz)   BMI 21.84 kg/m²     Physical Exam  Constitutional: No Acute Distress; Well Kempt  Eyes: PERRLA  Ears: auditory canals clear, TM's +LR b/l  ENT: Pharynx clear, neck supple  Lymphatic: No anterior cervical, supraclavicular adenopathy  Cardiovascular: RRR, +S1, S2, no murmurs appreciated, physiologic JVD.  Extremities: no cyanosis, clubbing. Trace pedal edema. Pedal pulses intact  Respiratory: clear without rales, rhonchi or wheezes  Gastrointestinal: +BS, soft, nontender  Genitourinary: not examined  Musculoskeletal: some kyphosis of spine  Integumentary: skin warm, no tenting, no remarkable lesions  Neurological: non-focal deficit. Get-up-and-go: pushes to stand  Gait: stable  Psychiatric: affect mostly full     Component  Ref Range & Units 2 mo ago  (24) 5 mo ago  (24) 5 mo ago  (24) 5 mo ago  (24) 5 mo ago  (24) 5 mo ago  (24) 8 mo ago  (10/16/23)   Glucose  74 - 99 mg/dL 85 107  High  97 99 131 High  105 High  101 High    Sodium  136 - 145 mmol/L 139 135 Low  133 Low  134 Low  134 Low  136 136   Potassium  3.5 - 5.3 mmol/L 4.8 4.0 4.0 4.0 3.7 5.1 5.3   Chloride  98 - 107 mmol/L 101 103 102 101 101 100 98   Bicarbonate  21 - 32 mmol/L 28 24 23 27 23 28 28   Anion Gap  10 - 20 mmol/L 15 12 12 10 14 13 15   Urea Nitrogen  6 - 23 mg/dL 25 High  17 19 20 22 23 25 High    Creatinine  0.50 - 1.05 mg/dL 0.83 0.65 0.62 0.82 0.85 0.76 0.92   eGFR  >60 mL/min/1.73m*2 65 82 CM 83 CM 66 CM 64 CM 73 CM 58 Low  CM   Comment: Calculations of estimated GFR are performed using the 2021 CKD-EPI Study Refit equation without the race variable for the IDMS-Traceable creatinine methods.  https://jasn.asnjournals.org/content/early/2021/09/22/ASN.2096164675   Calcium  8.6 - 10.6 mg/dL 9.7 8.6 R 8.4 Low  R 8.6 R 9.0 R 9.9 10.3   Albumin  3.4 - 5.0 g/dL 3.8    3.6 4.3 4.4   Alkaline Phosphatase  33 - 136 U/L 60    58 63 62   Total Protein  6.4 - 8.2 g/dL 7.2    6.6 7.8 7.6   AST  9 - 39 U/L 19    18 19 18   Bilirubin, Total  0.0 - 1.2 mg/dL 0.5    0.5 0.5 0.5   ALT  7 - 45 U/L 11    9 CM 13 CM 10 CM       Lab Results   Component Value Date    TSH 2.61 01/16/2024    TSH 1.80 06/14/2023    TSH 1.50 12/02/2022     Lab Results   Component Value Date    FREET4 1.04 03/05/2020     Lab Results   Component Value Date    JYUISMEO68 461 01/16/2024    SMKIPHMF65 549 06/14/2023    HOUOWCFY16 928 (H) 12/02/2022     Lab Results   Component Value Date    HGBA1C 5.8 (H) 01/16/2024    HGBA1C 5.8 (A) 06/14/2023    HGBA1C 6.0 03/10/2021     Lab Results   Component Value Date    VITD25 67 01/16/2024    VITD25 69 06/14/2023    VITD25 61 12/02/2022      Component  Ref Range & Units 5 mo ago  (2/8/24) 5 mo ago  (2/7/24) 5 mo ago  (2/6/24) 5 mo ago  (2/5/24) 5 mo ago  (1/16/24) 1 yr ago  (6/14/23) 1 yr ago  (2/8/23)   WBC  4.4 - 11.3 x10*3/uL 6.7 6.4 5.6 9.1 7.3 6.1 R 6.5 R   nRBC  0.0 - 0.0 /100 WBCs 0.0 0.0 0.0 0.0 0.0 0.0 R    RBC  4.00  - 5.20 x10*6/uL 3.73 Low  3.53 Low  3.49 Low  3.77 Low  3.95 Low  3.97 Low  R 3.64 Low  R   Hemoglobin  12.0 - 16.0 g/dL 11.7 Low  11.6 Low  11.3 Low  12.5 13.0 12.4 11.6 Low    Hematocrit  36.0 - 46.0 % 35.1 Low  35.0 Low  34.9 Low  36.2 39.4 37.2 35.8 Low    MCV  80 - 100 fL 94 99 100 96 100 94 98   MCH  26.0 - 34.0 pg 31.4 32.9 32.4 33.2 32.9     MCHC  32.0 - 36.0 g/dL 33.3 33.1 32.4 34.5 33.0 33.3 32.4   RDW  11.5 - 14.5 % 13.1 13.2 13.3 13.2 13.2 13.0 14.4   Platelets  150 - 450 x10*3/uL 231 208 209 234 196 190 R 167 R          Component  Ref Range & Units 1 yr ago 6 yr ago   Cholesterol  0 - 199 mg/dL 183 138 CM   HDL  mg/dL 61.8 51.9 CM   Cholesterol/HDL Ratio 3.0 2.7 CM   LDL  0 - 99 mg/dL 108 High  76 CM   VLDL  0 - 40 mg/dL 13 10   Triglycerides  0 - 149 mg/dL 64 52 CM        XR DEXA bone density 11/28/2023  FINDINGS:  SPINE L1-L4  Bone Mineral Density: 1.313  T-Score 1.2  Z-Score 3.7  LEFT FEMUR -TOTAL  Bone Mineral Density: 0.715  T-Score -2.3   Z-Score  0.7  LEFT FEMUR -NECK  Bone Mineral Density: 0.758  T-Score -2.0  Z-Score 1.0  Impression  DEXA:  According to World Health Organization criteria,  classification is low bone mass (osteopenia)       CT head wo IV contrast 02/05/2024  FINDINGS:  INTRACRANIAL:  Mild-to-moderate diffuse volume loss is seen bilaterally. Mild  periventricular white matter changes are seen.  The grey-white  differentiation is intact. There is no mass effect or midline shift.  There is no extraaxial fluid collection. There is no intracranial  hemorrhage.  The calvarium  is unremarkable.  EXTRACRANIAL:  Visualized paranasal sinuses and mastoids are clear.  Impression  1. Diffuse volume loss and periventricular white matter changes, most consistent with small vessel ischemic disease.  2. No evidence of acute cortical infarct or intracranial hemorrhage.    Assessment/Plan   1. Memory concerns for possible dementia  2. Caregiver burden stress  3. Anger and irritability/anxiety    patient was living independently until 12/2019 when she fell and broke her knee cap. her daughter Drea brought her up to the [facility] for rehabilitation and stay in an AL. then the pandemic occurred and dtr took her out of the facility and had her in hotel and then apartment with care givers. Then was switched to [facility] in independent living.  dtr noted some forgetfulness during this period, mainly when stressed per dtr. pt denies repeating, misplacing things or forgetting words. and she was still managing her own meds (supplements) and laundry. dtr has been doing her finances out of convenience. she hasn't driven since the pandemic. was not have difficulty previously. she scored 22/30 on MoCA in 2021 and then 17/30 in 1/2024. Deficits in executive function (3/5), attention (4/6 total), and 0/5 in delayed recall with a MIS score of 6/15, and orientation of 4/6. No reported change in memory or functioning today.  Patient previously was adamant on not starting any meds to help with memory.  Pt also adamant on not going to NH or even [facility].  Pt's dtr reported at last visit that pt had irritability and anger towards her.  today she states that pt's mood is better since pt's other dtr from [place] has stopped calling her every day. Pt denies depression or anxiety. She seems to be doing well in her IL at [facility] currently. Participating in exercise activities daily and in other social activities. Will repeat MoCA at next visit    4. Syncope  - pt had a vasovagal episode in February that resulted in hospitalization at [facility]. Was felt to be due to dehydration. Was given fluids in hospital. Her discharge was prolonged due to family dynamics (per discharge summary and hospital notes)    5. osteopenia  Last DeXA in 11/2023 showed T of -2.3 in L total femur and -2.0 in femoral neck. May want to consider prolia injections for the patient to reduce risk for fracture.    6. Urinary frequency and leakage  - of  note pt had been prescribed premarin in past. She is only using it as needed. She wears disposable pull ups. Does toilet as well. Not bothered by leakage.     F/up in 6 months with repeat MoCA     Tessy Brooks MD

## 2024-07-09 ENCOUNTER — OFFICE VISIT (OUTPATIENT)
Dept: GERIATRIC MEDICINE | Facility: CLINIC | Age: 89
End: 2024-07-09
Payer: MEDICARE

## 2024-07-09 VITALS
HEART RATE: 70 BPM | BODY MASS INDEX: 21.84 KG/M2 | WEIGHT: 119.4 LBS | TEMPERATURE: 96.2 F | RESPIRATION RATE: 16 BRPM | SYSTOLIC BLOOD PRESSURE: 144 MMHG | DIASTOLIC BLOOD PRESSURE: 68 MMHG

## 2024-07-09 DIAGNOSIS — R55 SYNCOPE, UNSPECIFIED SYNCOPE TYPE: ICD-10-CM

## 2024-07-09 DIAGNOSIS — R45.86 MOOD AND AFFECT DISTURBANCE: ICD-10-CM

## 2024-07-09 DIAGNOSIS — R41.89 COGNITIVE IMPAIRMENT: Primary | ICD-10-CM

## 2024-07-09 DIAGNOSIS — N95.8 GENITOURINARY SYNDROME OF MENOPAUSE: ICD-10-CM

## 2024-07-09 DIAGNOSIS — R21 RASH: ICD-10-CM

## 2024-07-09 DIAGNOSIS — M85.852 OSTEOPENIA OF LEFT HIP: ICD-10-CM

## 2024-07-09 PROCEDURE — 99215 OFFICE O/P EST HI 40 MIN: CPT | Performed by: INTERNAL MEDICINE

## 2024-07-09 PROCEDURE — 1157F ADVNC CARE PLAN IN RCRD: CPT | Performed by: INTERNAL MEDICINE

## 2024-07-09 PROCEDURE — 1123F ACP DISCUSS/DSCN MKR DOCD: CPT | Performed by: INTERNAL MEDICINE

## 2024-07-09 PROCEDURE — 1160F RVW MEDS BY RX/DR IN RCRD: CPT | Performed by: INTERNAL MEDICINE

## 2024-07-09 PROCEDURE — 1159F MED LIST DOCD IN RCRD: CPT | Performed by: INTERNAL MEDICINE

## 2024-07-09 PROCEDURE — 1126F AMNT PAIN NOTED NONE PRSNT: CPT | Performed by: INTERNAL MEDICINE

## 2024-07-09 RX ORDER — TRIAMCINOLONE ACETONIDE 1 MG/G
CREAM TOPICAL 2 TIMES DAILY PRN
Qty: 15 G | Refills: 0 | Status: SHIPPED | OUTPATIENT
Start: 2024-07-09

## 2024-07-09 ASSESSMENT — PAIN SCALES - GENERAL: PAINLEVEL: 0-NO PAIN

## 2024-07-09 ASSESSMENT — ENCOUNTER SYMPTOMS
LOSS OF SENSATION IN FEET: 0
OCCASIONAL FEELINGS OF UNSTEADINESS: 0

## 2024-07-09 NOTE — PATIENT INSTRUCTIONS
For your memory  - continue regular exercise, socialization, and mentally stimulating activities  - continue reading. Consider word searches, sudoku, puzzles, crafts, brain games     2. Follow up visit with Dr. Brooks in 6 months  - we will repeat the memory test

## 2024-07-11 ENCOUNTER — APPOINTMENT (OUTPATIENT)
Dept: RHEUMATOLOGY | Facility: CLINIC | Age: 89
End: 2024-07-11
Payer: MEDICARE

## 2024-07-11 ENCOUNTER — TELEPHONE (OUTPATIENT)
Dept: RHEUMATOLOGY | Facility: CLINIC | Age: 89
End: 2024-07-11

## 2024-07-11 VITALS — HEART RATE: 71 BPM | SYSTOLIC BLOOD PRESSURE: 157 MMHG | DIASTOLIC BLOOD PRESSURE: 68 MMHG | RESPIRATION RATE: 18 BRPM

## 2024-07-11 DIAGNOSIS — S32.000D COMPRESSION FRACTURE OF LUMBAR VERTEBRA WITH ROUTINE HEALING, UNSPECIFIED LUMBAR VERTEBRAL LEVEL, SUBSEQUENT ENCOUNTER: Primary | ICD-10-CM

## 2024-07-11 DIAGNOSIS — M80.00XD AGE-RELATED OSTEOPOROSIS WITH CURRENT PATHOLOGICAL FRACTURE WITH ROUTINE HEALING, SUBSEQUENT ENCOUNTER: ICD-10-CM

## 2024-07-11 PROCEDURE — 99215 OFFICE O/P EST HI 40 MIN: CPT | Performed by: STUDENT IN AN ORGANIZED HEALTH CARE EDUCATION/TRAINING PROGRAM

## 2024-07-11 PROCEDURE — 1159F MED LIST DOCD IN RCRD: CPT | Performed by: STUDENT IN AN ORGANIZED HEALTH CARE EDUCATION/TRAINING PROGRAM

## 2024-07-11 PROCEDURE — 1157F ADVNC CARE PLAN IN RCRD: CPT | Performed by: STUDENT IN AN ORGANIZED HEALTH CARE EDUCATION/TRAINING PROGRAM

## 2024-07-11 PROCEDURE — 1123F ACP DISCUSS/DSCN MKR DOCD: CPT | Performed by: STUDENT IN AN ORGANIZED HEALTH CARE EDUCATION/TRAINING PROGRAM

## 2024-07-11 NOTE — PROGRESS NOTES
Subjective   Patient ID: Grace Ahn is a 89 y.o. female who presents for No chief complaint on file..  HPI:  Female with a history of rash, osteoporosis, compression fractures of L1-L3, Some asymptomatic, due to , wheezing, syncope, SVT, varicose veins, mitral valve prolapse, here for osteoporosis. Last saw Dr Sierra 9.2021.    Lives at [facility]  Personal or family history of fragility fracture?        Compression fractures                                                      Hx of hypothyroidism/hyperthyroidism/hyperparathyroidism/hypoparathyroidism?no  Hx of GI , renal , liver, collagen or malignancy issues?                         Accelerated bone loss meds ?    anti-seizure/ depression meds, immunosuppressants , anti- aromatase inhibitors, Thyroid replacement therapy, anticoagulants?   steroids?            no              Anti-resorptive meds? Bisphosphonates, estrogen, Calcitonin- Prolia since?- started 2017 around age 88; briefly tried boniva for around 9 months prior to prolia; had body aches  dental hygiene?no  dental work planned?no; last extraction around 2022  Dairy (Y)  Ca/VitD takes caltrate 600 mg a day, Vitamin D3  (unclear dose but has it in her calcium tab, vitamin D3  alone, and in her one a day ) ETOH (Y/N) Tobacco (Y/N)    Menopause? Around 1986  Heart attack or stroke?no  History of Kidney Stones?no    Objective   There were no vitals taken for this visit.      Physical Exam  Constitutional: Alert and in no acute distress. Well developed, well nourished    Lab Results   Component Value Date    WBC 6.7 02/08/2024    HGB 11.7 (L) 02/08/2024    HCT 35.1 (L) 02/08/2024     02/08/2024    ALT 11 05/01/2024    AST 19 05/01/2024    CREATININE 0.83 05/01/2024          Lab Results   Component Value Date    CRP 6.11 (A) 08/31/2021   \\            There is currently no information documented on the homunculus. Go to the Rheumatology activity and complete the homunculus joint exam.      ECG 12  lead  Normal sinus rhythm  Left anterior fascicular block  Minimal voltage criteria for LVH, may be normal variant ( [place] product )  Anteroseptal infarct (cited on or before 05-FEB-2024)  Abnormal ECG  When compared with ECG of 08-FEB-2023 13:14,  Previous ECG has undetermined rhythm, needs review  See ED provider note for full interpretation and clinical correlation  Confirmed by Mayela Sofia ([id number]) on 2/7/2024 10:35:39 AM      === 11/28/23 ===    DEXA BONE DENSITY    - Impression -  DEXA:  According to World Health Organization criteria,  classification is low bone mass (osteopenia)    Followup recommended in two years or sooner as clinically warranted.    All images and detailed analysis are available on the  Radiology  PACS.    MACRO:  None    Signed by: Renato Lopez 12/1/2023 11:35 AM  Dictation workstation:   [id number]    Assessment/Plan:  #Osteoporosis  -Patient counseled on osteoporosis diagnosis. Patient counseled on necessary vitamin D and calcium supplementation.  Patient counseled on importance of stability and balance.  Weightbearing exercises, such as walking, encouraged.  Patient handout given.    11/2023 dexa at [facility] - next due 11/2025 at [facility]  SPINE L1-L4  Bone Mineral Density: 1.313  T-Score 1.2  Z-Score 3.7 (difficult to interpret d/t hx of compression fractures)          LEFT FEMUR -TOTAL  Bone Mineral Density: 0.715  T-Score -2.3   Z-Score  0.7          LEFT FEMUR -NECK  Bone Mineral Density: 0.758  T-Score -2.0  Z-Score 1.0    -November 2023 DEXA personally reviewed and interpreted by myself, consistent with osteopenia at left femoral neck and left femur, however in setting of spinal compression fractures, patient has osteoporosis  -recommend continuing prolia- likely lifelong- specialty pharmacy messaged 7/11/2024 to order for 1 year  -Risks of denosumab discussed including but not limited to back pain, hand  pain, feet  pain, , low calcium, jaw osteonecrosis, atypical femur fracture, allergies, risk of rebound bone loss if patient is not completely adherent with injection schedule. Patient understanding and aware of risks  -should repeat dexa 11/2025 at [facility]  Patient counseled to seek medical care if any new or worsening symptoms, urgently if needed.    -Prescription management : ordered prolia for one year    Note will be sent to primary care doctor.    Return to clinic in 1 yr,  sooner if needed    Dragon dictation software was used to dictate this note. Errors may have occurred during dictation that was not intended by the user.

## 2024-07-11 NOTE — PATIENT INSTRUCTIONS
Goal of 2000 units of vitamin d3    Continue caltrate- goal 500-600 mg a day     Continue Prolia    Next one scheduled for 11/11

## 2024-07-19 DIAGNOSIS — M81.0 AGE-RELATED OSTEOPOROSIS WITHOUT CURRENT PATHOLOGICAL FRACTURE: Primary | ICD-10-CM

## 2024-07-19 RX ORDER — DIPHENHYDRAMINE HYDROCHLORIDE 50 MG/ML
50 INJECTION INTRAMUSCULAR; INTRAVENOUS AS NEEDED
OUTPATIENT
Start: 2024-11-11

## 2024-07-19 RX ORDER — EPINEPHRINE 0.3 MG/.3ML
0.3 INJECTION SUBCUTANEOUS EVERY 5 MIN PRN
OUTPATIENT
Start: 2024-11-11

## 2024-07-19 RX ORDER — FAMOTIDINE 10 MG/ML
20 INJECTION INTRAVENOUS ONCE AS NEEDED
OUTPATIENT
Start: 2024-11-11

## 2024-07-19 RX ORDER — ALBUTEROL SULFATE 0.83 MG/ML
3 SOLUTION RESPIRATORY (INHALATION) AS NEEDED
OUTPATIENT
Start: 2024-11-11

## 2024-07-19 NOTE — PROGRESS NOTES
Per Dr. Pierce:     Hi specialty pharmacy team, I am taking over ordering the Prolia for this patient from her primary care Dr. Quiñonez.    Can you please order Prolia for 1 year for this patient, with a CMP before each injection?  She goes to [facility].  She states her next injection is already scheduled for November 2024.  Thank you.

## 2024-09-20 ENCOUNTER — APPOINTMENT (OUTPATIENT)
Dept: PRIMARY CARE | Facility: CLINIC | Age: 89
End: 2024-09-20
Payer: MEDICARE

## 2024-09-20 VITALS
WEIGHT: 121.2 LBS | BODY MASS INDEX: 22.31 KG/M2 | DIASTOLIC BLOOD PRESSURE: 64 MMHG | HEIGHT: 62 IN | HEART RATE: 55 BPM | OXYGEN SATURATION: 93 % | SYSTOLIC BLOOD PRESSURE: 139 MMHG

## 2024-09-20 DIAGNOSIS — M81.0 AGE-RELATED OSTEOPOROSIS WITHOUT CURRENT PATHOLOGICAL FRACTURE: Primary | ICD-10-CM

## 2024-09-20 DIAGNOSIS — E04.1 THYROID NODULE: ICD-10-CM

## 2024-09-20 DIAGNOSIS — N89.8 VAGINAL IRRITATION: ICD-10-CM

## 2024-09-20 PROBLEM — L27.0 GENERALIZED SKIN ERUPTION DUE TO DRUGS AND MEDICAMENTS TAKEN INTERNALLY: Status: RESOLVED | Noted: 2023-07-31 | Resolved: 2024-09-20

## 2024-09-20 PROBLEM — R55 SYNCOPE AND COLLAPSE: Status: RESOLVED | Noted: 2023-10-16 | Resolved: 2024-09-20

## 2024-09-20 PROBLEM — E87.1 HYPONATREMIA: Status: RESOLVED | Noted: 2023-06-16 | Resolved: 2024-09-20

## 2024-09-20 PROBLEM — I83.10 VARICOSE VEINS WITH INFLAMMATION: Status: RESOLVED | Noted: 2023-10-16 | Resolved: 2024-09-20

## 2024-09-20 PROBLEM — T45.1X5D ADVERSE EFFECT OF ANTINEOPLASTIC AND IMMUNOSUPPRESSIVE DRUGS, SUBSEQUENT ENCOUNTER: Status: RESOLVED | Noted: 2023-07-31 | Resolved: 2024-09-20

## 2024-09-20 PROBLEM — R20.2 PARESTHESIA OF SKIN: Status: RESOLVED | Noted: 2023-07-31 | Resolved: 2024-09-20

## 2024-09-20 PROBLEM — I34.1 MVP (MITRAL VALVE PROLAPSE): Status: RESOLVED | Noted: 2023-06-16 | Resolved: 2024-09-20

## 2024-09-20 PROBLEM — H49.22 6TH NERVE PALSY, LEFT: Status: RESOLVED | Noted: 2023-06-07 | Resolved: 2024-09-20

## 2024-09-20 PROBLEM — M62.81 MUSCLE WEAKNESS: Status: RESOLVED | Noted: 2023-10-16 | Resolved: 2024-09-20

## 2024-09-20 PROBLEM — R39.11 URINARY HESITANCY: Status: RESOLVED | Noted: 2023-06-16 | Resolved: 2024-09-20

## 2024-09-20 PROBLEM — T25.232A: Status: RESOLVED | Noted: 2023-10-16 | Resolved: 2024-09-20

## 2024-09-20 PROBLEM — M17.12 PRIMARY LOCALIZED OSTEOARTHROSIS OF LEFT LOWER LEG: Status: RESOLVED | Noted: 2023-10-16 | Resolved: 2024-09-20

## 2024-09-20 PROBLEM — L24.A2 IRRITANT CONTACT DERMATITIS DUE TO FECAL, URINARY OR DUAL INCONTINENCE: Status: RESOLVED | Noted: 2023-07-31 | Resolved: 2024-09-20

## 2024-09-20 PROBLEM — E87.6 HYPOKALEMIA: Status: RESOLVED | Noted: 2023-06-16 | Resolved: 2024-09-20

## 2024-09-20 PROCEDURE — 1036F TOBACCO NON-USER: CPT | Performed by: FAMILY MEDICINE

## 2024-09-20 PROCEDURE — 1157F ADVNC CARE PLAN IN RCRD: CPT | Performed by: FAMILY MEDICINE

## 2024-09-20 PROCEDURE — 1123F ACP DISCUSS/DSCN MKR DOCD: CPT | Performed by: FAMILY MEDICINE

## 2024-09-20 PROCEDURE — 99214 OFFICE O/P EST MOD 30 MIN: CPT | Performed by: FAMILY MEDICINE

## 2024-09-20 PROCEDURE — G2211 COMPLEX E/M VISIT ADD ON: HCPCS | Performed by: FAMILY MEDICINE

## 2024-09-20 RX ORDER — ESTRADIOL 0.1 MG/G
CREAM VAGINAL
Qty: 42.5 G | Refills: 3 | Status: SHIPPED | OUTPATIENT
Start: 2024-09-20

## 2024-09-20 NOTE — PROGRESS NOTES
"Subjective   Patient ID: Grace Ahn is a 89 y.o. female who presents for Follow-up (3 month follow up ).    HPI   The patient reports that she is on Prolia injections osteoporosis.    Review of Systems  Constitutional: No fever or chills  Cardiovascular: no chest pain, no palpitations and no syncope.   Respiratory: no cough, no shortness of breath during exertion and no shortness of breath at rest.   Gastrointestinal: no abdominal pain, no nausea and no vomiting.  Neuro: No Headache, no dizziness    Objective   /64   Pulse 55   Ht 1.575 m (5' 2\")   Wt 55 kg (121 lb 3.2 oz)   SpO2 93%   BMI 22.17 kg/m²     Physical Exam  Constitutional: Alert and in no acute distress. Well developed, well nourished  Head and Face: Head and face: Normal.    Cardiovascular: Heart rate and rhythm were normal, normal S1 and S2. No peripheral edema.   Pulmonary: No respiratory distress. Clear bilateral breath sounds.  Musculoskeletal: Gait and station: Normal. Muscle strength/tone: Normal.   Skin: Normal skin color and pigmentation, normal skin turgor, and no rash.    Psychiatric: Judgment and insight: Intact. Mood and affect: Normal.    Lab Results   Component Value Date    WBC 6.7 02/08/2024    HGB 11.7 (L) 02/08/2024    HCT 35.1 (L) 02/08/2024     02/08/2024    CHOL 183 06/14/2023    TRIG 64 06/14/2023    HDL 61.8 06/14/2023    ALT 11 05/01/2024    AST 19 05/01/2024     05/01/2024    K 4.8 05/01/2024     05/01/2024    CREATININE 0.83 05/01/2024    BUN 25 (H) 05/01/2024    CO2 28 05/01/2024    TSH 2.61 01/16/2024    INR 1.2 (H) 01/21/2020    HGBA1C 5.8 (H) 01/16/2024       ECG 12 lead  Normal sinus rhythm  Left anterior fascicular block  Minimal voltage criteria for LVH, may be normal variant ( [place] product )  Anteroseptal infarct (cited on or before 05-FEB-2024)  Abnormal ECG  When compared with ECG of 08-FEB-2023 13:14,  Previous ECG has undetermined rhythm, needs review  See ED provider note for " full interpretation and clinical correlation  Confirmed by Mayela Sofia ([id number]) on 2/7/2024 10:35:39 AM      Assessment/Plan   Assessment & Plan  Age-related osteoporosis without current pathological fracture  The patient is on Prolia injections.  Orders:    Follow Up In [facility] - PCP - Established    Follow Up In [facility] - PCP - Established; Future    Thyroid nodule  Thyroid US ordered.  Orders:    US thyroid; Future    Follow up in 4 months.    Your yearly Physical is due in: June 2025   When you call the office for your yearly Physical, please ask them to inform me to order your blood work, so that you can get the fasting blood work before your appointment and we can discuss the results at your physical.      Please call me if any questions arise from now until your next visit. I will call you after I am done seeing patients. A Doctor is always available by phone when the office is closed. Please feel free to call for help with any problem that you feel shouldn't wait until the office re-opens.     Scribe Attestation  By signing my name below, ITheodora Scribe   attest that this documentation has been prepared under the direction and in the presence of Naima Quiñonez MD.

## 2024-09-20 NOTE — ASSESSMENT & PLAN NOTE
The patient is on Prolia injections.  Orders:    Follow Up In [facility] - PCP - Established    Follow Up In [facility] - PCP - Established; Future

## 2024-09-25 ENCOUNTER — APPOINTMENT (OUTPATIENT)
Dept: RADIOLOGY | Facility: CLINIC | Age: 89
End: 2024-09-25
Payer: MEDICARE

## 2024-10-27 ASSESSMENT — DERMATOLOGY QUALITY OF LIFE (QOL) ASSESSMENT
RATE HOW EMOTIONALLY BOTHERED YOU ARE BY YOUR SKIN PROBLEM (FOR EXAMPLE, WORRY, EMBARRASSMENT, FRUSTRATION): 1
DATE THE QUALITY-OF-LIFE ASSESSMENT WAS COMPLETED: 67140
RATE HOW EMOTIONALLY BOTHERED YOU ARE BY YOUR SKIN PROBLEM (FOR EXAMPLE, WORRY, EMBARRASSMENT, FRUSTRATION): 1
WHAT SINGLE SKIN CONDITION LISTED BELOW IS THE PATIENT ANSWERING THE QUALITY-OF-LIFE ASSESSMENT QUESTIONS ABOUT: NONE OF THE ABOVE
RATE HOW BOTHERED YOU ARE BY SYMPTOMS OF YOUR SKIN PROBLEM (EG, ITCHING, STINGING BURNING, HURTING OR SKIN IRRITATION): 1
RATE HOW BOTHERED YOU ARE BY SYMPTOMS OF YOUR SKIN PROBLEM (EG, ITCHING, STINGING BURNING, HURTING OR SKIN IRRITATION): 1
WHAT SINGLE SKIN CONDITION LISTED BELOW IS THE PATIENT ANSWERING THE QUALITY-OF-LIFE ASSESSMENT QUESTIONS ABOUT: NONE OF THE ABOVE

## 2024-10-27 ASSESSMENT — PATIENT GLOBAL ASSESSMENT (PGA): WHAT IS THE PGA: PATIENT GLOBAL ASSESSMENT:  1 - CLEAR

## 2024-10-29 ENCOUNTER — APPOINTMENT (OUTPATIENT)
Dept: DERMATOLOGY | Facility: CLINIC | Age: 89
End: 2024-10-29
Payer: MEDICARE

## 2024-10-29 DIAGNOSIS — L57.8 ACTINIC SKIN DAMAGE: ICD-10-CM

## 2024-10-29 DIAGNOSIS — L81.4 LENTIGO: ICD-10-CM

## 2024-10-29 DIAGNOSIS — L57.0 ACTINIC KERATOSIS: ICD-10-CM

## 2024-10-29 DIAGNOSIS — D22.9 MULTIPLE BENIGN NEVI: Primary | ICD-10-CM

## 2024-10-29 DIAGNOSIS — L30.9 LIP LICKING DERMATITIS: ICD-10-CM

## 2024-10-29 DIAGNOSIS — Z85.828 PERSONAL HISTORY OF SKIN CANCER: ICD-10-CM

## 2024-10-29 DIAGNOSIS — R21 RASH: ICD-10-CM

## 2024-10-29 DIAGNOSIS — Z12.83 SCREENING EXAM FOR SKIN CANCER: ICD-10-CM

## 2024-10-29 DIAGNOSIS — L82.1 SEBORRHEIC KERATOSIS: ICD-10-CM

## 2024-10-29 PROCEDURE — 1123F ACP DISCUSS/DSCN MKR DOCD: CPT | Performed by: DERMATOLOGY

## 2024-10-29 PROCEDURE — 1160F RVW MEDS BY RX/DR IN RCRD: CPT | Performed by: DERMATOLOGY

## 2024-10-29 PROCEDURE — 17000 DESTRUCT PREMALG LESION: CPT | Performed by: DERMATOLOGY

## 2024-10-29 PROCEDURE — 1157F ADVNC CARE PLAN IN RCRD: CPT | Performed by: DERMATOLOGY

## 2024-10-29 PROCEDURE — 99213 OFFICE O/P EST LOW 20 MIN: CPT | Performed by: DERMATOLOGY

## 2024-10-29 PROCEDURE — 17003 DESTRUCT PREMALG LES 2-14: CPT | Performed by: DERMATOLOGY

## 2024-10-29 PROCEDURE — 1159F MED LIST DOCD IN RCRD: CPT | Performed by: DERMATOLOGY

## 2024-10-29 PROCEDURE — 1036F TOBACCO NON-USER: CPT | Performed by: DERMATOLOGY

## 2024-10-29 RX ORDER — MUPIROCIN 20 MG/G
OINTMENT TOPICAL 3 TIMES DAILY
Qty: 22 G | Refills: 3 | Status: SHIPPED | OUTPATIENT
Start: 2024-10-29

## 2024-10-29 ASSESSMENT — ITCH NUMERIC RATING SCALE: HOW SEVERE IS YOUR ITCHING?: 0

## 2024-10-29 ASSESSMENT — DERMATOLOGY PATIENT ASSESSMENT
DO YOU HAVE IRREGULAR MENSTRUAL CYCLES: NO
HAVE YOU HAD OR DO YOU HAVE A STAPH INFECTION: NO
DO YOU HAVE ANY NEW OR CHANGING LESIONS: NO
DO YOU USE A TANNING BED: NO
DO YOU USE SUNSCREEN: OCCASIONALLY
HAVE YOU HAD OR DO YOU HAVE VASCULAR DISEASE: NO
ARE YOU AN ORGAN TRANSPLANT RECIPIENT: NO
ARE YOU ON BIRTH CONTROL: NO
ARE YOU TRYING TO GET PREGNANT: NO

## 2024-10-29 ASSESSMENT — PATIENT GLOBAL ASSESSMENT (PGA): PATIENT GLOBAL ASSESSMENT: PATIENT GLOBAL ASSESSMENT:  1 - CLEAR

## 2024-10-29 ASSESSMENT — DERMATOLOGY QUALITY OF LIFE (QOL) ASSESSMENT
RATE HOW EMOTIONALLY BOTHERED YOU ARE BY YOUR SKIN PROBLEM (FOR EXAMPLE, WORRY, EMBARRASSMENT, FRUSTRATION): 0 - NEVER BOTHERED
ARE THERE EXCLUSIONS OR EXCEPTIONS FOR THE QUALITY OF LIFE ASSESSMENT: NO
RATE HOW BOTHERED YOU ARE BY EFFECTS OF YOUR SKIN PROBLEMS ON YOUR ACTIVITIES (EG, GOING OUT, ACCOMPLISHING WHAT YOU WANT, WORK ACTIVITIES OR YOUR RELATIONSHIPS WITH OTHERS): 0 - NEVER BOTHERED
RATE HOW BOTHERED YOU ARE BY SYMPTOMS OF YOUR SKIN PROBLEM (EG, ITCHING, STINGING BURNING, HURTING OR SKIN IRRITATION): 0 - NEVER BOTHERED
DATE THE QUALITY-OF-LIFE ASSESSMENT WAS COMPLETED: 67142

## 2024-11-05 ENCOUNTER — LAB (OUTPATIENT)
Dept: LAB | Facility: LAB | Age: 89
End: 2024-11-05
Payer: MEDICARE

## 2024-11-05 DIAGNOSIS — R73.9 ELEVATED BLOOD SUGAR: ICD-10-CM

## 2024-11-05 DIAGNOSIS — M81.0 AGE-RELATED OSTEOPOROSIS WITHOUT CURRENT PATHOLOGICAL FRACTURE: ICD-10-CM

## 2024-11-05 LAB
ALBUMIN SERPL BCP-MCNC: 3.8 G/DL (ref 3.4–5)
ALP SERPL-CCNC: 65 U/L (ref 33–136)
ALT SERPL W P-5'-P-CCNC: 10 U/L (ref 7–45)
ANION GAP SERPL CALC-SCNC: 14 MMOL/L (ref 10–20)
AST SERPL W P-5'-P-CCNC: 16 U/L (ref 9–39)
BILIRUB SERPL-MCNC: 0.4 MG/DL (ref 0–1.2)
BUN SERPL-MCNC: 20 MG/DL (ref 6–23)
CALCIUM SERPL-MCNC: 9.2 MG/DL (ref 8.6–10.6)
CHLORIDE SERPL-SCNC: 100 MMOL/L (ref 98–107)
CO2 SERPL-SCNC: 28 MMOL/L (ref 21–32)
CREAT SERPL-MCNC: 0.91 MG/DL (ref 0.5–1.05)
EGFRCR SERPLBLD CKD-EPI 2021: 59 ML/MIN/1.73M*2
ERYTHROCYTE [DISTWIDTH] IN BLOOD BY AUTOMATED COUNT: 13.1 % (ref 11.5–14.5)
EST. AVERAGE GLUCOSE BLD GHB EST-MCNC: 117 MG/DL
GLUCOSE SERPL-MCNC: 98 MG/DL (ref 74–99)
HBA1C MFR BLD: 5.7 %
HCT VFR BLD AUTO: 39.1 % (ref 36–46)
HGB BLD-MCNC: 12.5 G/DL (ref 12–16)
MCH RBC QN AUTO: 31.9 PG (ref 26–34)
MCHC RBC AUTO-ENTMCNC: 32 G/DL (ref 32–36)
MCV RBC AUTO: 100 FL (ref 80–100)
NRBC BLD-RTO: 0 /100 WBCS (ref 0–0)
PLATELET # BLD AUTO: 219 X10*3/UL (ref 150–450)
POTASSIUM SERPL-SCNC: 4.7 MMOL/L (ref 3.5–5.3)
PROT SERPL-MCNC: 7.2 G/DL (ref 6.4–8.2)
RBC # BLD AUTO: 3.92 X10*6/UL (ref 4–5.2)
SODIUM SERPL-SCNC: 137 MMOL/L (ref 136–145)
WBC # BLD AUTO: 7 X10*3/UL (ref 4.4–11.3)

## 2024-11-05 PROCEDURE — 36415 COLL VENOUS BLD VENIPUNCTURE: CPT

## 2024-11-05 PROCEDURE — 80053 COMPREHEN METABOLIC PANEL: CPT

## 2024-11-05 PROCEDURE — 85027 COMPLETE CBC AUTOMATED: CPT

## 2024-11-05 PROCEDURE — 83036 HEMOGLOBIN GLYCOSYLATED A1C: CPT

## 2024-11-11 ENCOUNTER — APPOINTMENT (OUTPATIENT)
Dept: INFUSION THERAPY | Facility: CLINIC | Age: 89
End: 2024-11-11
Payer: MEDICARE

## 2024-11-11 VITALS
RESPIRATION RATE: 16 BRPM | DIASTOLIC BLOOD PRESSURE: 73 MMHG | TEMPERATURE: 98 F | HEART RATE: 76 BPM | OXYGEN SATURATION: 98 % | SYSTOLIC BLOOD PRESSURE: 150 MMHG

## 2024-11-11 DIAGNOSIS — M81.0 AGE-RELATED OSTEOPOROSIS WITHOUT CURRENT PATHOLOGICAL FRACTURE: ICD-10-CM

## 2024-11-11 PROCEDURE — 96372 THER/PROPH/DIAG INJ SC/IM: CPT | Performed by: NURSE PRACTITIONER

## 2024-11-11 RX ORDER — EPINEPHRINE 0.3 MG/.3ML
0.3 INJECTION SUBCUTANEOUS EVERY 5 MIN PRN
OUTPATIENT
Start: 2025-05-10

## 2024-11-11 RX ORDER — DIPHENHYDRAMINE HYDROCHLORIDE 50 MG/ML
50 INJECTION INTRAMUSCULAR; INTRAVENOUS AS NEEDED
OUTPATIENT
Start: 2025-05-10

## 2024-11-11 RX ORDER — FAMOTIDINE 10 MG/ML
20 INJECTION INTRAVENOUS ONCE AS NEEDED
OUTPATIENT
Start: 2025-05-10

## 2024-11-11 RX ORDER — ALBUTEROL SULFATE 0.83 MG/ML
3 SOLUTION RESPIRATORY (INHALATION) AS NEEDED
OUTPATIENT
Start: 2025-05-10

## 2024-11-11 ASSESSMENT — ENCOUNTER SYMPTOMS
COUGH: 0
WOUND: 0
WHEEZING: 0
DIZZINESS: 0
LIGHT-HEADEDNESS: 0
SHORTNESS OF BREATH: 0
NUMBNESS: 0
EXTREMITY WEAKNESS: 0
LEG SWELLING: 0
PALPITATIONS: 0

## 2024-11-11 NOTE — PATIENT INSTRUCTIONS
Today :We administered denosumab. Prolia 60mg subcutaneous injection right upper arm  Blood Calcium within 28 days of next Prolia appointment    For:   1. Age-related osteoporosis without current pathological fracture       Your next appointment is due in:  6 months        Please read the  Medication Guide that was given to you and reviewed during todays visit.     (Tell all doctors including dentists that you are taking this medication)     Go to the emergency room or call 911 if:  -You have signs of allergic reaction:   -Rash, hives, itching.   -Swollen, blistered, peeling skin.   -Swelling of face, lips, mouth, tongue or throat.   -Tightness of chest, trouble breathing, swallowing or talking     Call your doctor:  - If injection site gets red, warm, swollen, itchy or leaks fluid or pus.     (Leave band aid on your injection site for at least 2 hours and keep area clean and dry  - If you get sick or have symptoms of infection or are not feeling well for any reason.    (Wash your hands often, stay away from people who are sick)  - If you have side effects from your medication that do not go away or are bothersome.     (Refer to the teaching your nurse gave you for side effects to call your doctor about)    - Common side effects may include:  stuffy nose, headache, feeling tired, muscle aches, upset stomach  - Before receiving any vaccines     - Call [facility] at   If:  - You get sick, are on antibiotics, have had a recent vaccine, have surgery or dental work and your doctor wants your visit rescheduled.  - You need to cancel and reschedule your visit for any reason. Call at least 2 days before your visit if you need to cancel.   - Your insurance changes before your next visit.    (We will need to get approval from your new insurance. This can take up to two weeks.)     The [facility] is opened Monday thru Friday. We are closed on weekends and holidays.   Voice mail will take  your call if the center is closed. If you leave a message please allow 24 hours for a call back during weekdays. If you leave a message on a weekend/holiday, we will call you back the next business day.    A pharmacist is available Monday - Friday from 8:30AM to 3:30PM to help answer any questions you may have about your prescriptions(s). Please call pharmacy at:    [facility]: [phone]  [facility]: [phone]  [facility]: [phone]

## 2024-11-11 NOTE — PROGRESS NOTES
[facility]   Infusion Clinic Note   Date: 2024   Name: Grace Ahn  : 1929   MRN: [id number]          Reason for Visit: Injections (Every 6 months Prolia 60mg subcutaneous injection)         Today: We administered denosumab.       Visit Type: INJECTION       Ordered By: Meenakshi Pierce MD       Accompanied by: Daughter       Diagnosis: Age-related osteoporosis without current pathological fracture        Allergies:   Allergies as of 2024 - Reviewed 2024   Allergen Reaction Noted    Bacitracin-polymyxin b Unknown 10/05/2010    Latex Other 2023    Penicillins Hives 2008    Adhesive tape-silicones Rash 10/20/2022    Bacitracin Rash 2020    Cephalosporins Hives and Rash 10/20/2022    Chlorine Rash 10/20/2022    Ciprofloxacin Rash and Unknown 10/20/2022    Doxycycline Rash 10/20/2022    Neomycin Rash 10/20/2022    Omeprazole Rash 2023    Pantoprazole Rash 10/20/2022    Sulfamethoxazole Rash 2023          Current Medications has a current medication list which includes the following prescription(s): acetaminophen, proair respiclick, calcium carbonate, cholecalciferol, denosumab, estradiol, estrogens (conjugated), mupirocin, one-a-day women's 50 plus, and triamcinolone.       Vitals:   Vitals:    24 1456   BP: 150/73   Pulse: 76   Resp: 16   Temp: 36.7 °C (98 °F)   TempSrc: Temporal   SpO2: 98%             Infusion Pre-procedure Checklist:   - Allergies reviewed: yes   - Medications reviewed: yes       - Previous reaction to current treatment: no      Assess patient for the concerns below. Document provider notification as appropriate.  - Active or recent infection with/without current antibiotic use: no  - Recent or planned invasive dental work: no  - Recent or planned surgeries: no  - Recently received or plans to receive vaccinations: no  - Has treatment related toxicities: no  - Is pregnant:  no      Pain: 0   - Is the pain  different from normal: n/a   - Is your Doctor aware:  n/a                Fall Risk Screening: Christine Fall Risk  History of Falling, Immediate or Within 3 Months: No  Ambulatory Aid: Walks without aid/bedrest/nurse assist  Intravenous Therapy/Heparin Lock: No  Gait/Transferring: Normal/bedrest/immobile  Mental Status: Oriented to own ability       Review Of Systems:  Review of Systems   Respiratory:  Negative for cough, shortness of breath and wheezing.    Cardiovascular:  Negative for chest pain, leg swelling and palpitations.   Skin:  Negative for itching, rash and wound.   Neurological:  Negative for dizziness, extremity weakness, light-headedness and numbness.         ROS completed? Yes      Infusion Readiness:  - Assessment Concerns Related to Infusion: No  - Provider notified: n/a      Document Below Only If Indicated:   New Patient Education:    N/A (returning patient for continuation of therapy. Ongoing education provided as needed.)        Treatment Conditions & Drug Specific Questions:    Denosumab  (PROLIA. XGEVA)    (Unless otherwise specified on patient specific therapy plan):     TREATMENT CONDITIONS:  Unless otherwise specified on patient specific therapy plan HOLD and notify provider prior to proceeding with today's injection if patients:  o Calcium is LESS THAN 8.6 mg/dL OR  Ionized Calcium LESS THAN 1.1 mmol/L  o Recent or planned invasive dental procedure (within 4 weeks)    Lab Results   Component Value Date    CALCIUM 9.2 11/05/2024    PHOS 4.7 09/29/2022      Labs reviewed and patient meets treatment conditions? Yes    DRUG SPECIFIC QUESTIONS:  Is the patient taking calcium and vitamin D? Yes  (Recommended)    Pt Instructed on following risks: (1) hypocalcemia, (2) osteonecrosis of the jaw, (3) atypical femoral fractures, (4) serious infections, and (5) dermatologic reactions?  Yes      REMINDER:  PREGNANCY CATEGORY X DRUG. OBTAIN NEGTATIVE PREGNANCY TEST PRIOR TO FIRST INFUSION FOR WOMEN OF  CHILDBEARING ABILITY   REMS DRUG    Recommended Vitals/Observation:  Vitals: Obtain vitals prior to injection.  Observation: Patient may leave immediately following injection.        Weight Based Drug Calculations:    WEIGHT BASED DRUGS: NOT APPLICABLE / FLAT DOSE          Initiated By: Michelle Sloan LPN

## 2025-01-07 ENCOUNTER — CLINICAL SUPPORT (OUTPATIENT)
Dept: GERIATRIC MEDICINE | Facility: CLINIC | Age: OVER 89
End: 2025-01-07
Payer: MEDICARE

## 2025-01-07 ENCOUNTER — OFFICE VISIT (OUTPATIENT)
Dept: GERIATRIC MEDICINE | Facility: CLINIC | Age: OVER 89
End: 2025-01-07
Payer: MEDICARE

## 2025-01-07 VITALS
DIASTOLIC BLOOD PRESSURE: 60 MMHG | SYSTOLIC BLOOD PRESSURE: 168 MMHG | RESPIRATION RATE: 18 BRPM | BODY MASS INDEX: 22.52 KG/M2 | WEIGHT: 123.1 LBS | HEART RATE: 67 BPM | TEMPERATURE: 97 F

## 2025-01-07 DIAGNOSIS — M85.852 OSTEOPENIA OF LEFT HIP: ICD-10-CM

## 2025-01-07 DIAGNOSIS — N95.8 GENITOURINARY SYNDROME OF MENOPAUSE: ICD-10-CM

## 2025-01-07 DIAGNOSIS — R45.86 MOOD AND AFFECT DISTURBANCE: Primary | ICD-10-CM

## 2025-01-07 DIAGNOSIS — I10 PRIMARY HYPERTENSION: ICD-10-CM

## 2025-01-07 DIAGNOSIS — R32 URINARY INCONTINENCE, UNSPECIFIED TYPE: ICD-10-CM

## 2025-01-07 DIAGNOSIS — R41.89 COGNITIVE IMPAIRMENT: ICD-10-CM

## 2025-01-07 DIAGNOSIS — R45.86 MOOD AND AFFECT DISTURBANCE: ICD-10-CM

## 2025-01-07 PROCEDURE — 1170F FXNL STATUS ASSESSED: CPT | Performed by: INTERNAL MEDICINE

## 2025-01-07 PROCEDURE — 1126F AMNT PAIN NOTED NONE PRSNT: CPT | Performed by: INTERNAL MEDICINE

## 2025-01-07 PROCEDURE — 3077F SYST BP >= 140 MM HG: CPT | Performed by: INTERNAL MEDICINE

## 2025-01-07 PROCEDURE — 99214 OFFICE O/P EST MOD 30 MIN: CPT | Performed by: INTERNAL MEDICINE

## 2025-01-07 PROCEDURE — 1157F ADVNC CARE PLAN IN RCRD: CPT | Performed by: INTERNAL MEDICINE

## 2025-01-07 PROCEDURE — 1123F ACP DISCUSS/DSCN MKR DOCD: CPT | Performed by: INTERNAL MEDICINE

## 2025-01-07 PROCEDURE — 1159F MED LIST DOCD IN RCRD: CPT | Performed by: INTERNAL MEDICINE

## 2025-01-07 PROCEDURE — 3078F DIAST BP <80 MM HG: CPT | Performed by: INTERNAL MEDICINE

## 2025-01-07 PROCEDURE — G2211 COMPLEX E/M VISIT ADD ON: HCPCS | Performed by: INTERNAL MEDICINE

## 2025-01-07 ASSESSMENT — MONTREAL COGNITIVE ASSESSMENT (MOCA)
VISUOSPATIAL/EXECUTIVE SUBSCORE: 3
WHAT IS THE TOTAL SCORE (OUT OF 30): 15
5. MEMORY TRIALS: 0
9. REPEAT EACH SENTENCE: 2
10. [FLUENCY] NAME WORDS STARTING WITH DESIGNATED LETTER: 0
12. MEMORY INDEX SCORE: 0
4. NAME EACH OF THE THREE ANIMALS SHOWN: 2
11. FOR EACH PAIR OF WORDS, WHAT CATEGORY DO THEY BELONG TO (OUT OF 2): 1
8. SERIAL SUBTRACTION OF 7S: 3
13. ORIENTATION SUBSCORE: 2
6. READ LIST OF DIGITS [FORWARD/BACKWARD]: 1
7. [VIGILENCE] TAP WHEN HEARING DESIGNATED LETTER: 1
WHAT LEVEL OF EDUCATION WAS ATTAINED: 0

## 2025-01-07 ASSESSMENT — ACTIVITIES OF DAILY LIVING (ADL)
DOING_HOUSEWORK: NEEDS ASSISTANCE
TAKING_MEDICATION: INDEPENDENT
DRESSING: INDEPENDENT
BATHING: INDEPENDENT
MANAGING_FINANCES: TOTAL CARE
GROCERY_SHOPPING: NEEDS ASSISTANCE

## 2025-01-07 ASSESSMENT — ENCOUNTER SYMPTOMS
OCCASIONAL FEELINGS OF UNSTEADINESS: 0
DEPRESSION: 0
LOSS OF SENSATION IN FEET: 0

## 2025-01-07 ASSESSMENT — PAIN SCALES - GENERAL: PAINLEVEL_OUTOF10: 0-NO PAIN

## 2025-01-07 NOTE — PATIENT INSTRUCTIONS
Try to increase cognitive stimulation  - puzzles, cards, word searches, dominos, reading, cross words     2. Continue socialization and exercise     3. Follow up in 6 months

## 2025-01-07 NOTE — PROGRESS NOTES
"Subjective   Ms. Ahn is 89 y.o. year old female and here for f/u of cognitive changes, anger and irritability, caregiver burden, osteoporosis, vulvar lesions, non-pressure ulcer of the L foot, sleep apnea (as per dtr), and wheezing. Here with her daughter lori munoz (a physician).     Last visit 7/9/24  Per pt discussion/summary:   For your memory  - continue regular exercise, socialization, and mentally stimulating activities  - continue reading. Consider word searches, sudoku, puzzles, crafts, brain games   2. Follow up visit with Dr. Brooks in 6 months  - we will repeat the memory test\"    7/11/24 saw rheum Dr. Pierce  \"#Osteoporosis  -Patient counseled on osteoporosis diagnosis. Patient counseled on necessary vitamin D and calcium supplementation.  Patient counseled on importance of stability and balance.  Weightbearing exercises, such as walking, encouraged.  Patient handout given.  11/2023 dexa at [facility] - next due 11/2025 at [facility]  SPINE L1-L4  Bone Mineral Density: 1.313  T-Score 1.2  Z-Score 3.7 (difficult to interpret d/t hx of compression fractures)  LEFT FEMUR -TOTAL  Bone Mineral Density: 0.715  T-Score -2.3   Z-Score  0.7  LEFT FEMUR -NECK  Bone Mineral Density: 0.758  T-Score -2.0  Z-Score 1.0  -November 2023 DEXA personally reviewed and interpreted by myself, consistent with osteopenia at left femoral neck and left femur, however in setting of spinal compression fractures, patient has osteoporosis  -recommend continuing prolia- likely lifelong- specialty pharmacy messaged 7/11/2024 to order for 1 year  -Risks of denosumab discussed including but not limited to back pain, hand pain, feet  pain, , low calcium, jaw osteonecrosis, atypical femur fracture, allergies, risk of rebound bone loss if patient is not completely adherent with injection schedule. Patient understanding and aware of risks  -should repeat dexa 11/2025 " "at [facility] place\"    Saw pcp 24. Thyroid ultrasound ordered. Next AWV 2025    Had prolia 24. On it for 5-6 years     HPI     Per patient and dtr (obtained in addition due to cognitive change)  - got flu, covid booster, rsv.   - got 1st shingrix. Needs 2nd.   - very social at her facility. Talks with friends during and after meals. Goes to activities.   - pt thinks her memory is ok  - dtr says her memory is variable.   - pt's other dtr (in [place]) had been waking pt up at 9:30pm was affecting her sleep  - dtr tooked off ringer to phone (land line). And put pt's cell phone on do not disturb during that time.   - up 6 lbs in past year.     Home environment: lives at an [facility] at [facility]. Independent living      - goes to chair volleyball 2-3x per week. Does exercise classes daily.   - goes to other programs  - [employer] dominos every Saturday evening     - has 3 meals per day included. But usually does breakfast and sometimes lunch     Alcohol: denies  Smoking: denies     Is dependant or requires assistance in the following BADL: independent  Is dependant or requires assistance in the following Instrumental ADL: independent to all except transporation, requires her daughter for transportation and requires assistance with finance. Her son was doing finances after pt's  . And the son . And now dtr doing finances. On auto pay. Pt takes vitamins out of bottle. Denies forgetting to take  Not driving. Volutarily stopped 1 year ago  Prepares food for herself     History of Abuse/Neglect/exploitation: caregiver burden stress noted but no abuse noted.    Medications reviewed and reconciled.     Objective   /60 (BP Location: Left arm, Patient Position: Sitting, BP Cuff Size: Adult)   Pulse 67   Temp 36.1 °C (97 °F) (Tympanic)   Resp 18   Wt 55.8 kg (123 lb 1.6 oz)   BMI 22.52 kg/m²     124/70 on manual recheck    Physical Exam  Constitutional: No Acute " Distress; Well Kempt  Eyes: PERRLA  Ears: auditory canals clear, TM's +LR b/l  ENT: Pharynx clear, neck supple  Lymphatic: No anterior cervical, supraclavicular adenopathy  Cardiovascular: RRR, +S1, S2, no murmurs appreciated, physiologic JVD.  Extremities: no cyanosis, clubbing, or edema. Pedal pulses intact  Respiratory: clear without rales, rhonchi or wheezes  Gastrointestinal: +BS, soft, nontender  Genitourinary: not examined  Musculoskeletal: kyphosis of spine.   Integumentary: skin warm, no tenting, no remarkable lesions  Neurological: non-focal deficit. Get-up-and-go:  slightly pushes to stand with 1 hand. Gait: stable without device  Psychiatric: affect full     MoCA 15/30 (some college)  MoCA  Visuospatial/Executive: 3 (missed hands on clock and cube (but was close on it))  Namin (missed rhino)  Memory (Score '0' as this is an Unscored Section): 0  Attention: Read List of Digits: 1 (missed 3 digits backwards)  Attention: Read List of Letters: 1  Attention: Serial Sevens: 3  Language: Repeat: 2  Language: Fluency: 0 (11 words)  Abstraction: 1  Delayed Recall: 0 (MIS 5/15. got 3 missede with mult choice and 1 missed with cat cues.)  Orientation: 2 (missed date, mionth, year and place.)  Add 1 Point if </=12 yr Education: 0 (college graduate)  MOCA Total Score: 15              Component  Ref Range & Units 2 mo ago  (24) 8 mo ago  (24) 11 mo ago  (24) 11 mo ago  (24) 11 mo ago  (24) 11 mo ago  (24) 11 mo ago  (24)   Glucose  74 - 99 mg/dL 98 85 107 High  97 99 131 High  105 High    Sodium  136 - 145 mmol/L 137 139 135 Low  133 Low  134 Low  134 Low  136   Potassium  3.5 - 5.3 mmol/L 4.7 4.8 4.0 4.0 4.0 3.7 5.1   Chloride  98 - 107 mmol/L 100 101 103 102 101 101 100   Bicarbonate  21 - 32 mmol/L 28 28 24 23 27 23 28   Anion Gap  10 - 20 mmol/L 14 15 12 12 10 14 13   Urea Nitrogen  6 - 23 mg/dL 20 25 High  17 19 20 22 23   Creatinine  0.50 - 1.05 mg/dL 0.91 0.83 0.65 0.62 0.82  0.85 0.76   eGFR  >60 mL/min/1.73m*2 59 Low  65 CM 82 CM 83 CM 66 CM 64 CM 73 CM   Calcium  8.6 - 10.6 mg/dL 9.2 9.7 8.6 R 8.4 Low  R 8.6 R 9.0 R 9.9   Albumin  3.4 - 5.0 g/dL 3.8 3.8    3.6 4.3   Alkaline Phosphatase  33 - 136 U/L 65 60    58 63   Total Protein  6.4 - 8.2 g/dL 7.2 7.2    6.6 7.8   AST  9 - 39 U/L 16 19    18 19   Bilirubin, Total  0.0 - 1.2 mg/dL 0.4 0.5    0.5 0.5   ALT  7 - 45 U/L 10 11 CM    9 CM 13 CM        Component  Ref Range & Units 2 mo ago  (11/5/24) 11 mo ago  (2/8/24) 11 mo ago  (2/7/24) 11 mo ago  (2/6/24) 11 mo ago  (2/5/24) 11 mo ago  (1/16/24) 1 yr ago  (6/14/23)   WBC  4.4 - 11.3 x10*3/uL 7.0 6.7 6.4 5.6 9.1 7.3 6.1 R   Hemoglobin  12.0 - 16.0 g/dL 12.5 11.7 Low  11.6 Low  11.3 Low  12.5 13.0 12.4   Hematocrit  36.0 - 46.0 % 39.1 35.1 Low  35.0 Low  34.9 Low  36.2 39.4 37.2   MCV  80 - 100 fL 100 94 99 100 96 100 94   RDW  11.5 - 14.5 % 13.1 13.1 13.2 13.3 13.2 13.2 13.0   Platelets  150 - 450 x10*3/uL 219 231 208 209 234 196 190 R     Lab Results   Component Value Date    TSH 2.61 01/16/2024    TSH 1.80 06/14/2023    TSH 1.50 12/02/2022     Lab Results   Component Value Date    FREET4 1.04 03/05/2020     Lab Results   Component Value Date    YIDPCOIJ72 461 01/16/2024    NBADQPPG20 549 06/14/2023    ETHYLWDO12 928 (H) 12/02/2022     Lab Results   Component Value Date    HGBA1C 5.7 (H) 11/05/2024    HGBA1C 5.8 (H) 01/16/2024    HGBA1C 5.8 (A) 06/14/2023     Lab Results   Component Value Date    VITD25 67 01/16/2024    VITD25 69 06/14/2023    VITD25 61 12/02/2022      CT head wo IV contrast 02/05/2024  FINDINGS:  INTRACRANIAL:  Mild-to-moderate diffuse volume loss is seen bilaterally. Mild  periventricular white matter changes are seen.  The grey-white  differentiation is intact. There is no mass effect or midline shift.  There is no extraaxial fluid collection. There is no intracranial  hemorrhage.  The calvarium  is unremarkable.  EXTRACRANIAL:  Visualized paranasal sinuses and  mastoids are clear.  Impression  1. Diffuse volume loss and periventricular white matter changes, most  consistent with small vessel ischemic disease.  2. No evidence of acute cortical infarct or intracranial hemorrhage.    Assessment/Plan   1. Cognitive impairment  3. Mood changes  Has been living in Independent living at [facility]. Pt denies concerns about memory. Her dtr notes that pt's memory fluctuates. Pt takes vitamins on her own. Getting help with finances, meals.  she scored 22/30 on MoCA in 2021 and then 17/30 in 1/2024. And 15/30 today. Pt is participating in activities regularly at her facility. Pt had irritability and anger in the past towards her dtr (according to her dtr). pt's mood has been better. Pt denies depression or anxiety.     4. HTN  - bp was elevated to 168/60 initially. And was in 120s systolic on manual recheck. Will continue to monitor     5. osteopenia  Last DeXA in 11/2023 showed T of -2.3 in L total femur and -2.0 in femoral neck. Has been  getting prolia injections. For 5-6 years per dtr. Had most recent 11/11/24. Will have repeat DeXA in 11/2025     6. Urinary frequency and leakage  - of note pt had been prescribed estradiol past. She is only using it as needed for itching at night. She wears disposable pull ups. Does toilet as well. Not bothered by leakage.      F/up in 6 months with repeat MoCA     Tessy Brooks MD

## 2025-01-15 ENCOUNTER — APPOINTMENT (OUTPATIENT)
Dept: PRIMARY CARE | Facility: CLINIC | Age: OVER 89
End: 2025-01-15
Payer: MEDICARE

## 2025-01-15 VITALS
HEIGHT: 62 IN | DIASTOLIC BLOOD PRESSURE: 62 MMHG | SYSTOLIC BLOOD PRESSURE: 132 MMHG | OXYGEN SATURATION: 98 % | WEIGHT: 122 LBS | BODY MASS INDEX: 22.45 KG/M2 | HEART RATE: 68 BPM

## 2025-01-15 DIAGNOSIS — R41.844 EXECUTIVE FUNCTION DEFICIT: ICD-10-CM

## 2025-01-15 DIAGNOSIS — N95.8 GENITOURINARY SYNDROME OF MENOPAUSE: ICD-10-CM

## 2025-01-15 DIAGNOSIS — M81.0 AGE-RELATED OSTEOPOROSIS WITHOUT CURRENT PATHOLOGICAL FRACTURE: Primary | ICD-10-CM

## 2025-01-15 PROCEDURE — 1159F MED LIST DOCD IN RCRD: CPT | Performed by: FAMILY MEDICINE

## 2025-01-15 PROCEDURE — 1036F TOBACCO NON-USER: CPT | Performed by: FAMILY MEDICINE

## 2025-01-15 PROCEDURE — 1160F RVW MEDS BY RX/DR IN RCRD: CPT | Performed by: FAMILY MEDICINE

## 2025-01-15 PROCEDURE — 1123F ACP DISCUSS/DSCN MKR DOCD: CPT | Performed by: FAMILY MEDICINE

## 2025-01-15 PROCEDURE — G2211 COMPLEX E/M VISIT ADD ON: HCPCS | Performed by: FAMILY MEDICINE

## 2025-01-15 PROCEDURE — 99214 OFFICE O/P EST MOD 30 MIN: CPT | Performed by: FAMILY MEDICINE

## 2025-01-15 PROCEDURE — 1157F ADVNC CARE PLAN IN RCRD: CPT | Performed by: FAMILY MEDICINE

## 2025-01-15 ASSESSMENT — PATIENT HEALTH QUESTIONNAIRE - PHQ9
SUM OF ALL RESPONSES TO PHQ9 QUESTIONS 1 AND 2: 0
2. FEELING DOWN, DEPRESSED OR HOPELESS: NOT AT ALL
1. LITTLE INTEREST OR PLEASURE IN DOING THINGS: NOT AT ALL

## 2025-01-15 ASSESSMENT — ENCOUNTER SYMPTOMS
DEPRESSION: 0
OCCASIONAL FEELINGS OF UNSTEADINESS: 0
LOSS OF SENSATION IN FEET: 0

## 2025-01-15 NOTE — ASSESSMENT & PLAN NOTE
The patient is on Prolia injections.   Orders:    Follow Up In [facility] - PCP - Established

## 2025-01-15 NOTE — PROGRESS NOTES
"Subjective   Patient ID: Grace Ahn is a 89 y.o. female who presents for Follow-up (4 Month).    HPI   The patient reports that she is on Prolia injections osteoporosis. She is taking Calcium and Vitamin D. She is utilizing the vaginal estrogen cream occasionally. She has the albuterol inhaler as needed.She also uses Bactroban and triamcinolone as prescribed       Review of Systems  Constitutional: No fever or chills  Cardiovascular: no chest pain, no palpitations and no syncope.   Respiratory: no cough, no shortness of breath during exertion and no shortness of breath at rest.   Gastrointestinal: no abdominal pain, no nausea and no vomiting.  Neuro: No Headache, no dizziness    Objective   /62   Pulse 68   Ht 1.575 m (5' 2\")   Wt 55.3 kg (122 lb)   SpO2 98%   BMI 22.31 kg/m²     Physical Exam  Constitutional: Alert and in no acute distress. Well developed, well nourished  Head and Face: Head and face: Normal.    Cardiovascular: Heart rate and rhythm were normal, normal S1 and S2. No peripheral edema.   Pulmonary: No respiratory distress. Clear bilateral breath sounds.  Musculoskeletal: Gait and station: Normal. Muscle strength/tone: Normal.   Skin: Normal skin color and pigmentation, normal skin turgor, and no rash.    Psychiatric: Judgment and insight: Intact. Mood and affect: Normal.      Lab Results   Component Value Date    WBC 7.0 11/05/2024    HGB 12.5 11/05/2024    HCT 39.1 11/05/2024     11/05/2024    CHOL 183 06/14/2023    TRIG 64 06/14/2023    HDL 61.8 06/14/2023    ALT 10 11/05/2024    AST 16 11/05/2024     11/05/2024    K 4.7 11/05/2024     11/05/2024    CREATININE 0.91 11/05/2024    BUN 20 11/05/2024    CO2 28 11/05/2024    TSH 2.61 01/16/2024    INR 1.2 (H) 01/21/2020    HGBA1C 5.7 (H) 11/05/2024       ECG 12 lead  Normal sinus rhythm  Left anterior fascicular block  Minimal voltage criteria for LVH, may be normal variant ( Artemio product )  Anteroseptal infarct " (cited on or before 05-FEB-2024)  Abnormal ECG  When compared with ECG of 08-FEB-2023 13:14,  Previous ECG has undetermined rhythm, needs review  See ED provider note for full interpretation and clinical correlation  Confirmed by Mayela Sofia ([id number]) on 2/7/2024 10:35:39 AM      Assessment/Plan   Assessment & Plan  Age-related osteoporosis without current pathological fracture  The patient is on Prolia injections.   Orders:    Follow Up In [facility] - PCP - Established    Executive function deficit  Stable, Monitored with Dr Brooks       Genitourinary syndrome of menopause  Continue Vaginal estrogen         Your yearly Physical is due in: June 2025  When you call the office for your yearly Physical, please ask them to inform me to order your blood work, so that you can get the fasting blood work before your appointment and we can discuss the results at your physical.      Please call me if any questions arise from now until your next visit. I will call you after I am done seeing patients. A Doctor is always available by phone when the office is closed. Please feel free to call for help with any problem that you feel shouldn't wait until the office re-opens.     Scribe Attestation  By signing my name below, ITyra Scribe attest that this documentation has been prepared under the direction and in the presence of Naima Quiñonez MD. All medical record entries made by the Scribe were at my direction or personally dictated by me. I have reviewed the chart and agree that the record accurately reflects my personal performance of the history, physical exam, discussion and plan.

## 2025-01-22 ENCOUNTER — PATIENT MESSAGE (OUTPATIENT)
Dept: PRIMARY CARE | Facility: CLINIC | Age: OVER 89
End: 2025-01-22
Payer: MEDICARE

## 2025-01-22 DIAGNOSIS — S51.002A OPEN WOUND OF LEFT ELBOW, INITIAL ENCOUNTER: Primary | ICD-10-CM

## 2025-02-06 ENCOUNTER — OFFICE VISIT (OUTPATIENT)
Dept: UROLOGY | Facility: CLINIC | Age: OVER 89
End: 2025-02-06
Payer: MEDICARE

## 2025-02-06 VITALS — TEMPERATURE: 97.2 F

## 2025-02-06 DIAGNOSIS — N95.8 GENITOURINARY SYNDROME OF MENOPAUSE: ICD-10-CM

## 2025-02-06 DIAGNOSIS — N89.8 VAGINAL IRRITATION: ICD-10-CM

## 2025-02-06 PROCEDURE — 1036F TOBACCO NON-USER: CPT | Performed by: OBSTETRICS & GYNECOLOGY

## 2025-02-06 PROCEDURE — 1126F AMNT PAIN NOTED NONE PRSNT: CPT | Performed by: OBSTETRICS & GYNECOLOGY

## 2025-02-06 PROCEDURE — 99213 OFFICE O/P EST LOW 20 MIN: CPT | Performed by: OBSTETRICS & GYNECOLOGY

## 2025-02-06 PROCEDURE — 1123F ACP DISCUSS/DSCN MKR DOCD: CPT | Performed by: OBSTETRICS & GYNECOLOGY

## 2025-02-06 PROCEDURE — 1157F ADVNC CARE PLAN IN RCRD: CPT | Performed by: OBSTETRICS & GYNECOLOGY

## 2025-02-06 PROCEDURE — 1159F MED LIST DOCD IN RCRD: CPT | Performed by: OBSTETRICS & GYNECOLOGY

## 2025-02-06 RX ORDER — ESTRADIOL 0.1 MG/G
CREAM VAGINAL
Qty: 42.5 G | Refills: 3 | Status: SHIPPED | OUTPATIENT
Start: 2025-02-06

## 2025-02-06 ASSESSMENT — PAIN SCALES - GENERAL: PAINLEVEL_OUTOF10: 0-NO PAIN

## 2025-02-21 ENCOUNTER — HOSPITAL ENCOUNTER (EMERGENCY)
Facility: HOSPITAL | Age: OVER 89
Discharge: HOME | End: 2025-02-21
Attending: EMERGENCY MEDICINE
Payer: MEDICARE

## 2025-02-21 VITALS
BODY MASS INDEX: 22.66 KG/M2 | WEIGHT: 120 LBS | HEIGHT: 61 IN | DIASTOLIC BLOOD PRESSURE: 88 MMHG | OXYGEN SATURATION: 99 % | TEMPERATURE: 98.4 F | HEART RATE: 55 BPM | SYSTOLIC BLOOD PRESSURE: 135 MMHG

## 2025-02-21 DIAGNOSIS — T39.1X1A ACCIDENTAL ACETAMINOPHEN OVERDOSE, INITIAL ENCOUNTER: Primary | ICD-10-CM

## 2025-02-21 LAB
ALBUMIN SERPL BCP-MCNC: 3.9 G/DL (ref 3.4–5)
ALP SERPL-CCNC: 66 U/L (ref 33–136)
ALT SERPL W P-5'-P-CCNC: 10 U/L (ref 7–45)
ANION GAP SERPL CALC-SCNC: 11 MMOL/L (ref 10–20)
APAP SERPL-MCNC: 11 UG/ML
APAP SERPL-MCNC: 22.7 UG/ML
AST SERPL W P-5'-P-CCNC: 17 U/L (ref 9–39)
BASOPHILS # BLD AUTO: 0.03 X10*3/UL (ref 0–0.1)
BASOPHILS NFR BLD AUTO: 0.3 %
BILIRUB SERPL-MCNC: 0.6 MG/DL (ref 0–1.2)
BUN SERPL-MCNC: 19 MG/DL (ref 6–23)
CALCIUM SERPL-MCNC: 8.9 MG/DL (ref 8.6–10.3)
CHLORIDE SERPL-SCNC: 102 MMOL/L (ref 98–107)
CO2 SERPL-SCNC: 24 MMOL/L (ref 21–32)
CREAT SERPL-MCNC: 0.84 MG/DL (ref 0.5–1.05)
EGFRCR SERPLBLD CKD-EPI 2021: 64 ML/MIN/1.73M*2
EOSINOPHIL # BLD AUTO: 0.08 X10*3/UL (ref 0–0.4)
EOSINOPHIL NFR BLD AUTO: 0.7 %
ERYTHROCYTE [DISTWIDTH] IN BLOOD BY AUTOMATED COUNT: 13.1 % (ref 11.5–14.5)
GLUCOSE SERPL-MCNC: 118 MG/DL (ref 74–99)
HCT VFR BLD AUTO: 38.8 % (ref 36–46)
HGB BLD-MCNC: 12.7 G/DL (ref 12–16)
IMM GRANULOCYTES # BLD AUTO: 0.03 X10*3/UL (ref 0–0.5)
IMM GRANULOCYTES NFR BLD AUTO: 0.3 % (ref 0–0.9)
LYMPHOCYTES # BLD AUTO: 1.13 X10*3/UL (ref 0.8–3)
LYMPHOCYTES NFR BLD AUTO: 10.2 %
MCH RBC QN AUTO: 31.3 PG (ref 26–34)
MCHC RBC AUTO-ENTMCNC: 32.7 G/DL (ref 32–36)
MCV RBC AUTO: 96 FL (ref 80–100)
MONOCYTES # BLD AUTO: 0.65 X10*3/UL (ref 0.05–0.8)
MONOCYTES NFR BLD AUTO: 5.9 %
NEUTROPHILS # BLD AUTO: 9.13 X10*3/UL (ref 1.6–5.5)
NEUTROPHILS NFR BLD AUTO: 82.6 %
NRBC BLD-RTO: 0 /100 WBCS (ref 0–0)
PLATELET # BLD AUTO: 228 X10*3/UL (ref 150–450)
POTASSIUM SERPL-SCNC: 4.6 MMOL/L (ref 3.5–5.3)
PROT SERPL-MCNC: 7.3 G/DL (ref 6.4–8.2)
RBC # BLD AUTO: 4.06 X10*6/UL (ref 4–5.2)
SODIUM SERPL-SCNC: 132 MMOL/L (ref 136–145)
WBC # BLD AUTO: 11.1 X10*3/UL (ref 4.4–11.3)

## 2025-02-21 PROCEDURE — 36415 COLL VENOUS BLD VENIPUNCTURE: CPT | Performed by: EMERGENCY MEDICINE

## 2025-02-21 PROCEDURE — 80143 DRUG ASSAY ACETAMINOPHEN: CPT | Performed by: EMERGENCY MEDICINE

## 2025-02-21 PROCEDURE — 80053 COMPREHEN METABOLIC PANEL: CPT | Performed by: EMERGENCY MEDICINE

## 2025-02-21 PROCEDURE — 85025 COMPLETE CBC W/AUTO DIFF WBC: CPT | Performed by: EMERGENCY MEDICINE

## 2025-02-21 PROCEDURE — 99283 EMERGENCY DEPT VISIT LOW MDM: CPT | Performed by: EMERGENCY MEDICINE

## 2025-02-21 ASSESSMENT — PAIN - FUNCTIONAL ASSESSMENT
PAIN_FUNCTIONAL_ASSESSMENT: 0-10
PAIN_FUNCTIONAL_ASSESSMENT: 0-10

## 2025-02-21 ASSESSMENT — COLUMBIA-SUICIDE SEVERITY RATING SCALE - C-SSRS
1. IN THE PAST MONTH, HAVE YOU WISHED YOU WERE DEAD OR WISHED YOU COULD GO TO SLEEP AND NOT WAKE UP?: NO
2. HAVE YOU ACTUALLY HAD ANY THOUGHTS OF KILLING YOURSELF?: NO
6. HAVE YOU EVER DONE ANYTHING, STARTED TO DO ANYTHING, OR PREPARED TO DO ANYTHING TO END YOUR LIFE?: NO

## 2025-02-21 ASSESSMENT — PAIN SCALES - GENERAL
PAINLEVEL_OUTOF10: 0 - NO PAIN

## 2025-02-21 ASSESSMENT — PAIN DESCRIPTION - PROGRESSION: CLINICAL_PROGRESSION: NOT CHANGED

## 2025-02-21 NOTE — ED PROVIDER NOTES
HPI   Chief Complaint   Patient presents with    Altered Mental Status       HPI        Patient History   Past Medical History:   Diagnosis Date    Acute cystitis without hematuria 02/10/2020    Acute cystitis without hematuria    Adverse effect of unspecified drugs, medicaments and biological substances, initial encounter 2021    Adverse reaction to drug, initial encounter    Allergic 1945    Delusional disorders 2021    Delusions    Laceration without foreign body, left lower leg, initial encounter 03/15/2020    Skin tear of left lower leg without complication    Other fracture of left patella, initial encounter for closed fracture 2020    Other fracture of left patella, initial encounter for closed fracture    Personal history of other specified conditions 2020    History of nocturia    Personal history of urinary (tract) infections 2021    History of urinary tract infection    Unspecified fracture of left patella, initial encounter for closed fracture 2020    Left patella fracture     Past Surgical History:   Procedure Laterality Date    APPENDECTOMY  193    EYE SURGERY      HYSTERECTOMY  1977    MR HEAD ANGIO WO IV CONTRAST  2022    MR HEAD ANGIO WO IV CONTRAST 2022 [facility] CLINICAL LEGACY    MR NECK ANGIO WO IV CONTRAST  2022    MR NECK ANGIO WO IV CONTRAST 2022 [facility] CLINICAL LEGACY    OTHER SURGICAL HISTORY  12/10/2019    Tonsillectomy with adenoidectomy    OTHER SURGICAL HISTORY  12/10/2019    Hysterectomy total abdominal with removal of both ovaries    OTHER SURGICAL HISTORY  12/10/2019    Root canal procedure    OTHER SURGICAL HISTORY  12/10/2019    Breast biopsy    OTHER SURGICAL HISTORY  12/10/2019     section    OTHER SURGICAL HISTORY  2021    Cataract surgery    OTHER SURGICAL HISTORY  2022    Carpal tunnel surgery    OTHER SURGICAL HISTORY  2022    Varicose vein sclerotherapy    OTHER SURGICAL HISTORY  2022     Excision of squamous cell carcinoma    OTHER SURGICAL HISTORY  02/23/2022    [facility] surgery     Family History   Problem Relation Name Age of Onset    Cancer Father Davis Delarosa      Social History     Tobacco Use    Smoking status: Never    Smokeless tobacco: Never   Substance Use Topics    Alcohol use: Not Currently    Drug use: Never       Physical Exam   ED Triage Vitals   Temp Pulse Resp BP   -- -- -- --      SpO2 Temp src Heart Rate Source Patient Position   -- -- -- --      BP Location FiO2 (%)     -- --       Physical Exam      ED Course & MDM                  No data recorded     [place] Coma Scale Score: 15 (02/21/25 0119 : Carlotta Carter RN)                           Medical Decision Making      Procedure  Procedures   Breath sounds: Normal breath sounds.   Abdominal:      General: Abdomen is flat.      Palpations: Abdomen is soft.      Tenderness: There is no abdominal tenderness. There is no guarding or rebound.   Musculoskeletal:         General: No deformity. Normal range of motion.      Cervical back: Normal range of motion and neck supple.   Skin:     General: Skin is warm.      Capillary Refill: Capillary refill takes less than 2 seconds.      Coloration: Skin is not jaundiced.   Neurological:      General: No focal deficit present.      Mental Status: She is alert. Mental status is at baseline.   Psychiatric:         Mood and Affect: Mood normal.         Behavior: Behavior normal.           ED Course & MDM   Diagnoses as of 02/25/25 2122   Accidental acetaminophen overdose, initial encounter                 No data recorded     [place] Coma Scale Score: 15 (02/21/25 0119 : Carlotta Carter RN)                           Medical Decision Making  Medical Decision Making: Patient remained stable during my time with her in the emergency department.  CBC demonstrated no significant abnormalities Chem-7 and LFTs were all within normal limits.  The patient's initial acetaminophen level was 22.    I explained the patient's family that at 4 hours after ingestion, a repeat Tylenol would be performed if this was in the nomogram for toxicity, the patient will be treated with NAC however, at 430, acetaminophen level was rechecked and was found to only be 11 as result, I can conclude that the patient did not ingest a toxic amount of acetaminophen today and as result and comfortable the patient being discharged home there instructed to follow-up with a primary care physician as needed.  Expressed understanding and agreement the patient was then discharged home in stable condition in custody of her family.    Amount and/or Complexity of Data Reviewed  Labs: ordered. Decision-making details documented in ED  Course.        Procedure  Procedures     Slava Quinn MD  02/27/25 1913

## 2025-02-25 ASSESSMENT — DERMATOLOGY QUALITY OF LIFE (QOL) ASSESSMENT
DATE THE QUALITY-OF-LIFE ASSESSMENT WAS COMPLETED: 67263
RATE HOW BOTHERED YOU ARE BY SYMPTOMS OF YOUR SKIN PROBLEM (EG, ITCHING, STINGING BURNING, HURTING OR SKIN IRRITATION): 0 - NEVER BOTHERED
RATE HOW EMOTIONALLY BOTHERED YOU ARE BY YOUR SKIN PROBLEM (FOR EXAMPLE, WORRY, EMBARRASSMENT, FRUSTRATION): 0 - NEVER BOTHERED
RATE HOW BOTHERED YOU ARE BY SYMPTOMS OF YOUR SKIN PROBLEM (EG, ITCHING, STINGING BURNING, HURTING OR SKIN IRRITATION): 0 - NEVER BOTHERED
RATE HOW BOTHERED YOU ARE BY EFFECTS OF YOUR SKIN PROBLEMS ON YOUR ACTIVITIES (EG, GOING OUT, ACCOMPLISHING WHAT YOU WANT, WORK ACTIVITIES OR YOUR RELATIONSHIPS WITH OTHERS): 0 - NEVER BOTHERED
WHAT SINGLE SKIN CONDITION LISTED BELOW IS THE PATIENT ANSWERING THE QUALITY-OF-LIFE ASSESSMENT QUESTIONS ABOUT: NONE OF THE ABOVE
RATE HOW EMOTIONALLY BOTHERED YOU ARE BY YOUR SKIN PROBLEM (FOR EXAMPLE, WORRY, EMBARRASSMENT, FRUSTRATION): 0 - NEVER BOTHERED
RATE HOW BOTHERED YOU ARE BY EFFECTS OF YOUR SKIN PROBLEMS ON YOUR ACTIVITIES (EG, GOING OUT, ACCOMPLISHING WHAT YOU WANT, WORK ACTIVITIES OR YOUR RELATIONSHIPS WITH OTHERS): 0 - NEVER BOTHERED
ARE THERE EXCLUSIONS OR EXCEPTIONS FOR THE QUALITY OF LIFE ASSESSMENT: NO
WHAT SINGLE SKIN CONDITION LISTED BELOW IS THE PATIENT ANSWERING THE QUALITY-OF-LIFE ASSESSMENT QUESTIONS ABOUT: NONE OF THE ABOVE

## 2025-02-25 ASSESSMENT — DERMATOLOGY PATIENT ASSESSMENT: DO YOU HAVE ANY NEW OR CHANGING LESIONS: YES

## 2025-02-25 ASSESSMENT — PATIENT GLOBAL ASSESSMENT (PGA): WHAT IS THE PGA: PATIENT GLOBAL ASSESSMENT:  1 - CLEAR

## 2025-02-27 ENCOUNTER — APPOINTMENT (OUTPATIENT)
Dept: DERMATOLOGY | Facility: CLINIC | Age: OVER 89
End: 2025-02-27
Payer: MEDICARE

## 2025-02-27 DIAGNOSIS — L57.0 ACTINIC KERATOSIS: Primary | ICD-10-CM

## 2025-02-27 DIAGNOSIS — L28.0 LICHEN SIMPLEX CHRONICUS: ICD-10-CM

## 2025-02-27 DIAGNOSIS — Z12.83 SCREENING EXAM FOR SKIN CANCER: ICD-10-CM

## 2025-02-27 DIAGNOSIS — L81.4 LENTIGO: ICD-10-CM

## 2025-02-27 DIAGNOSIS — L82.1 SEBORRHEIC KERATOSIS: ICD-10-CM

## 2025-02-27 DIAGNOSIS — D22.9 MULTIPLE BENIGN NEVI: ICD-10-CM

## 2025-02-27 DIAGNOSIS — Z85.828 PERSONAL HISTORY OF SKIN CANCER: ICD-10-CM

## 2025-02-27 DIAGNOSIS — L57.8 ACTINIC SKIN DAMAGE: ICD-10-CM

## 2025-02-27 PROCEDURE — 1160F RVW MEDS BY RX/DR IN RCRD: CPT | Performed by: DERMATOLOGY

## 2025-02-27 PROCEDURE — 1123F ACP DISCUSS/DSCN MKR DOCD: CPT | Performed by: DERMATOLOGY

## 2025-02-27 PROCEDURE — 1036F TOBACCO NON-USER: CPT | Performed by: DERMATOLOGY

## 2025-02-27 PROCEDURE — 17000 DESTRUCT PREMALG LESION: CPT | Performed by: DERMATOLOGY

## 2025-02-27 PROCEDURE — 99213 OFFICE O/P EST LOW 20 MIN: CPT | Performed by: DERMATOLOGY

## 2025-02-27 PROCEDURE — 1157F ADVNC CARE PLAN IN RCRD: CPT | Performed by: DERMATOLOGY

## 2025-02-27 PROCEDURE — 17003 DESTRUCT PREMALG LES 2-14: CPT | Performed by: DERMATOLOGY

## 2025-02-27 PROCEDURE — 1159F MED LIST DOCD IN RCRD: CPT | Performed by: DERMATOLOGY

## 2025-02-27 NOTE — PROGRESS NOTES
Subjective     Grace Ahn is a 89 y.o. female who presents for the following: Skin Check (Bilateral thighs, face; Hx of Aks, Non-Melanoma Skin Cancer; no family hx of skin ca). Last derm visit 10/29/24 for Full Skin Exam. History of squamous cell carcinoma, basal cell carcinoma, actinic keratosis.     Review of Systems:  No other skin or systemic complaints other than what is documented elsewhere in the note.    The following portions of the chart were reviewed this encounter and updated as appropriate:  Tobacco  Allergies  Meds  Problems  Med Hx  Surg Hx         Skin Cancer History  No skin cancer on file.      Specialty Problems          Dermatology Problems    AK (actinic keratosis)     Formatting of this note might be different from the original. To back, face, scalp Formatting of this note might be different from the original. Below nose,upper lip,left shoulder         Lichen simplex chronicus    Personal history of other malignant neoplasm of skin     Lesion 1: Squamous Cell Carcinoma. Year Diagnosed: 2014. November. Location: Forehead. Treatment(s): Staged Excision. Excision performed by Dr. Keshawn Schultz (Plastics) in [place]     Lesion 2: Basal Cell Carcinoma. Year Diagnosed: 2014. January. Location: Left Lower Lip. Treatment(s): Staged Excision. Treated by: Done by Mohs in [place]         Rosacea, unspecified    Xerosis cutis        Objective   Well appearing patient in no apparent distress; mood and affect are within normal limits.    A full examination was performed including scalp, head, eyes, ears, nose, lips, neck, chest, axillae, abdomen, back, buttocks, bilateral upper extremities, bilateral lower extremities, hands, feet, fingers, toes, fingernails, and toenails. All findings within normal limits unless otherwise noted below.    Assessment/Plan   1. Actinic keratosis (5)  Mid Forehead (2), Right Eyebrow, Right Nasal Sidewall (2)  Erythematous scaly macule(s)    -Discussed nature of  diagnosis and treatment options.   -Patient wishes to proceed with Cryotherapy today  -Possible side effects of liquid nitrogen treatment reviewed including formation of blisters, crusting, tenderness, scar, and discoloration which may be permanent.  -Patient advised to return the office for re-evaluation if the treated lesion(s) do not resolve within 4-6 weeks. Patient verbalizes understanding.    Destr of lesion - Mid Forehead (2), Right Eyebrow, Right Nasal Sidewall (2)  Complexity: simple    Destruction method: cryotherapy    Informed consent: discussed and consent obtained    Lesion destroyed using liquid nitrogen: Yes    Outcome: patient tolerated procedure well with no complications    Post-procedure details: wound care instructions given      2. Multiple benign nevi  Brown and tan macules and papules with reassuring findings on dermoscopy    -These lesions have benign, reassuring patterns on dermoscopy  -Recommend continued self observation, and to contact the office if any changes in nevi are noticed    3. Lentigo  Tan macules    -Benign appearing on exam  -Reassurance, recommend observation    4. Seborrheic keratosis (3)  Generalized, Right Lower Leg - Posterior, Right Thigh - Anterior  Stuck on, waxy macule(s)/papule(s)/plaque(s) with comedo-like openings and milia like cysts    -Discussed the nature of the diagnosis  -Reassurance, recommend continued observation    5. Actinic skin damage  Background of photodamage with hyper- and hypo-pigmented macules on the skin    A few small actinic keratoses on nose and forehead, monitor due to age     6. Personal history of skin cancer    Personal History of Non-Melanoma Skin Cancer  -Well healed scar(s) with no evidence of recurrence  -Discussed the need for annual or semi-annual skin examinations and to return sooner if any new or changing lesions are noticed. Patient verbalizes understanding    7. Screening exam for skin cancer        Upper body skin exam today  only    -No lesions concerning for malignancy on the remainder the skin exam today   - The ugly duckling sign was discussed. Monitor for any skin lesions that are different in color, shape, or size than others on body  -Sun protection was discussed. Recommend SPF 30+, hats with brims, sun protective clothing, and avoiding sun exposure between 10 AM and 2 PM whenever possible  -Recommend regular skin exams or sooner if new or changing lesions       Related Procedures  Follow Up In Dermatology - Established Patient    8. Lichen simplex chronicus  Pubic  Light pink erythema, one healing erosion on left labium minorum    Lichen simplex chronicus with vaginal irritation  - bx 10/2023 with Dr. eLntz consistent with lichen simplex chronicus  - bland emollients and premarin  - follows with Dr. Lentz in urogynecology        Follow up 4 months Full Skin Exam as scheduled; keep following every 4-6 months due to frequent skin changes/rashes

## 2025-05-08 ENCOUNTER — LAB (OUTPATIENT)
Dept: LAB | Facility: HOSPITAL | Age: OVER 89
End: 2025-05-08
Payer: MEDICARE

## 2025-05-08 DIAGNOSIS — M81.0 AGE-RELATED OSTEOPOROSIS WITHOUT CURRENT PATHOLOGICAL FRACTURE: Primary | ICD-10-CM

## 2025-05-08 LAB
ALBUMIN SERPL BCP-MCNC: 4 G/DL (ref 3.4–5)
ALP SERPL-CCNC: 67 U/L (ref 33–136)
ALT SERPL W P-5'-P-CCNC: 10 U/L (ref 7–45)
ANION GAP SERPL CALC-SCNC: 12 MMOL/L (ref 10–20)
AST SERPL W P-5'-P-CCNC: 18 U/L (ref 9–39)
BILIRUB SERPL-MCNC: 0.6 MG/DL (ref 0–1.2)
BUN SERPL-MCNC: 22 MG/DL (ref 6–23)
CALCIUM SERPL-MCNC: 9.9 MG/DL (ref 8.6–10.6)
CHLORIDE SERPL-SCNC: 101 MMOL/L (ref 98–107)
CO2 SERPL-SCNC: 30 MMOL/L (ref 21–32)
CREAT SERPL-MCNC: 1 MG/DL (ref 0.5–1.05)
EGFRCR SERPLBLD CKD-EPI 2021: 52 ML/MIN/1.73M*2
GLUCOSE SERPL-MCNC: 113 MG/DL (ref 74–99)
POTASSIUM SERPL-SCNC: 4.9 MMOL/L (ref 3.5–5.3)
PROT SERPL-MCNC: 7.4 G/DL (ref 6.4–8.2)
SODIUM SERPL-SCNC: 138 MMOL/L (ref 136–145)

## 2025-05-08 PROCEDURE — 80053 COMPREHEN METABOLIC PANEL: CPT

## 2025-05-15 ENCOUNTER — APPOINTMENT (OUTPATIENT)
Dept: INFUSION THERAPY | Facility: CLINIC | Age: OVER 89
End: 2025-05-15
Payer: MEDICARE

## 2025-05-15 VITALS
SYSTOLIC BLOOD PRESSURE: 132 MMHG | OXYGEN SATURATION: 99 % | WEIGHT: 123.46 LBS | RESPIRATION RATE: 16 BRPM | TEMPERATURE: 98.2 F | DIASTOLIC BLOOD PRESSURE: 78 MMHG | BODY MASS INDEX: 23.33 KG/M2 | HEART RATE: 67 BPM

## 2025-05-15 DIAGNOSIS — M81.0 AGE-RELATED OSTEOPOROSIS WITHOUT CURRENT PATHOLOGICAL FRACTURE: ICD-10-CM

## 2025-05-15 PROCEDURE — 96372 THER/PROPH/DIAG INJ SC/IM: CPT | Performed by: NURSE PRACTITIONER

## 2025-05-15 RX ORDER — EPINEPHRINE 0.3 MG/.3ML
0.3 INJECTION SUBCUTANEOUS EVERY 5 MIN PRN
OUTPATIENT
Start: 2025-11-06

## 2025-05-15 RX ORDER — DIPHENHYDRAMINE HYDROCHLORIDE 50 MG/ML
50 INJECTION, SOLUTION INTRAMUSCULAR; INTRAVENOUS AS NEEDED
OUTPATIENT
Start: 2025-11-06

## 2025-05-15 RX ORDER — ALBUTEROL SULFATE 0.83 MG/ML
3 SOLUTION RESPIRATORY (INHALATION) AS NEEDED
OUTPATIENT
Start: 2025-11-06

## 2025-05-15 RX ORDER — FAMOTIDINE 10 MG/ML
20 INJECTION, SOLUTION INTRAVENOUS ONCE AS NEEDED
OUTPATIENT
Start: 2025-11-06

## 2025-05-15 ASSESSMENT — ENCOUNTER SYMPTOMS
LEG SWELLING: 0
LIGHT-HEADEDNESS: 0
WOUND: 0
PALPITATIONS: 0
DIZZINESS: 0
COUGH: 0
SHORTNESS OF BREATH: 0
WHEEZING: 0
EXTREMITY WEAKNESS: 0
NUMBNESS: 0

## 2025-05-15 NOTE — PROGRESS NOTES
[facility]   Infusion Clinic Note   Date: May 15, 2025   Name: Grace Ahn  : 1929   MRN: [id number]         Reason for Visit: Follow-up and Injections (PT HERE FOR PROLIA 60 MG INJECTION EVERY 6 MO)         Today: We administered denosumab.       Ordered By: Meenakshi Pierce MD       For a Diagnosis of: Age-related osteoporosis without current pathological fracture       At today's visit patient accompanied by: Self      Today's Vitals:   Vitals:    05/15/25 1404   BP: 132/78   Pulse: 67   Resp: 16   Temp: 36.8 °C (98.2 °F)   SpO2: 99%   Weight: 56 kg (123 lb 7.3 oz)             Pre - Treatment Checklist:      - Previous reaction to current treatment: no      (Assess patient for the concerns below. Document provider notification as appropriate).  - Active or recent infection with/without current antibiotic use: no  - Recent or planned invasive dental work: no  - Recent or planned surgeries: no  - Recently received or plans to receive vaccinations: no  - Has treatment related toxicities: no  - Any chance may be pregnant:  n/a      Pain: 0   - Is the pain different from normal: n/a   - Is prescribing Doctor aware:  n/a      Labs: Reviewed       Fall Risk Screening: Christine Fall Risk  History of Falling, Immediate or Within 3 Months: No  Secondary Diagnosis: Yes  Ambulatory Aid: Crutches/cane/walker  Intravenous Therapy/Heparin Lock: No  Gait/Transferring: Weak  Mental Status: Oriented to own ability  Christine Fall Risk Score: 40       Review Of Systems:  Review of Systems   Respiratory:  Negative for cough, shortness of breath and wheezing.    Cardiovascular:  Negative for chest pain, leg swelling and palpitations.   Skin:  Negative for itching, rash and wound.   Neurological:  Negative for dizziness, extremity weakness, light-headedness and numbness.         Infusion Readiness:  - Assessment Concerns Related to Infusion: No  - Provider notified: n/a      New Patient Education:    N/A  "(returning patient for continuation of therapy. Ongoing education provided as needed.)        Treatment Conditions & Drug Specific Questions:    Denosumab  (PROLIA. XGEVA. STOBOCLO)    (Unless otherwise specified on patient specific therapy plan):     TREATMENT CONDITIONS:  Unless otherwise specified on patient specific therapy plan HOLD and notify provider prior to proceeding with today's injection if patients:  O Corrected or Serum Calcium LESS THAN 8.6 mg/dL  OR Ionized calcium less than 1.1 mmol/L or  less than 4.7 mg/dL (depending on resulting agency)  o Recent or planned invasive dental procedure (within 4 weeks)    Lab Results   Component Value Date    CALCIUM 9.9 05/08/2025    PHOS 4.7 09/29/2022      No results found for: \"CAION\"    Patient meets treatment conditions? Yes    DRUG SPECIFIC QUESTIONS:  Is the patient taking calcium and vitamin D? Yes  (Recommended)    Pt Instructed on following risks: (1) hypocalcemia, (2) osteonecrosis of the jaw, (3) atypical femoral fractures, (4) serious infections, and (5) dermatologic reactions?  Yes      REMINDER:  PREGNANCY CATEGORY X DRUG. OBTAIN NEGTATIVE PREGNANCY TEST PRIOR TO FIRST INFUSION FOR WOMEN OF CHILDBEARING ABILITY   REMS DRUG    Recommended Vitals/Observation:  Vitals: Obtain vitals prior to injection.  Observation: Patient may leave immediately following injection.        Weight Based Drug Calculations:    WEIGHT BASED DRUGS: NOT APPLICABLE / FLAT DOSE       Post Treatment: Patient tolerated treatment without issue and was discharged in no apparent distress.      Note Authored / Patient Cared for By: Concepcion Nava RN        "

## 2025-05-15 NOTE — PATIENT INSTRUCTIONS
Today :We administered denosumab.     For:   1. Age-related osteoporosis without current pathological fracture         Your next appointment is due in:  6 MO        Please read the  Medication Guide that was given to you and reviewed during todays visit.     (Tell all doctors including dentists that you are taking this medication)     Go to the emergency room or call 911 if:  -You have signs of allergic reaction:   -Rash, hives, itching.   -Swollen, blistered, peeling skin.   -Swelling of face, lips, mouth, tongue or throat.   -Tightness of chest, trouble breathing, swallowing or talking     Call your doctor:  - If IV / injection site gets red, warm, swollen, itchy or leaks fluid or pus.     (Leave dressing on your IV site for at least 2 hours and keep area clean and dry  - If you get sick or have symptoms of infection or are not feeling well for any reason.    (Wash your hands often, stay away from people who are sick)  - If you have side effects from your medication that do not go away or are bothersome.     (Refer to the teaching your nurse gave you for side effects to call your doctor about)    - Common side effects may include:  stuffy nose, headache, feeling tired, muscle aches, upset stomach  - Before receiving any vaccines     - Call the [facility] at   If:  - You get sick, are on antibiotics, have had a recent vaccine, have surgery or dental work and your doctor wants your visit rescheduled.  - You need to cancel and reschedule your visit for any reason. Call at least 2 days before your visit if you need to cancel.   - Your insurance changes before your next visit.    (We will need to get approval from your new insurance. This can take up to two weeks.)     The [facility] is opened Monday thru Friday. We are closed on weekends and holidays.   Voice mail will take your call if the center is closed. If you leave a message please allow 24 hours for a call back during weekdays.  If you leave a message on a weekend/holiday, we will call you back the next business day.    A pharmacist is available Monday - Friday from 8:30AM to 3:30PM to help answer any questions you may have about your prescriptions(s). Please call pharmacy at:    [facility]: [phone]  [facility]: [phone]  [facility]: [phone]               No

## 2025-06-04 ENCOUNTER — APPOINTMENT (OUTPATIENT)
Dept: RADIOLOGY | Facility: HOSPITAL | Age: OVER 89
End: 2025-06-04
Payer: MEDICARE

## 2025-06-04 ENCOUNTER — HOSPITAL ENCOUNTER (OUTPATIENT)
Facility: HOSPITAL | Age: OVER 89
Setting detail: OBSERVATION
Discharge: HOME | End: 2025-06-05
Attending: STUDENT IN AN ORGANIZED HEALTH CARE EDUCATION/TRAINING PROGRAM | Admitting: INTERNAL MEDICINE
Payer: MEDICARE

## 2025-06-04 ENCOUNTER — APPOINTMENT (OUTPATIENT)
Dept: CARDIOLOGY | Facility: HOSPITAL | Age: OVER 89
End: 2025-06-04
Payer: MEDICARE

## 2025-06-04 DIAGNOSIS — R42 DIZZINESS: ICD-10-CM

## 2025-06-04 DIAGNOSIS — R42 VERTIGO: Primary | ICD-10-CM

## 2025-06-04 LAB
ALBUMIN SERPL BCP-MCNC: 3.8 G/DL (ref 3.4–5)
ALP SERPL-CCNC: 65 U/L (ref 33–136)
ALT SERPL W P-5'-P-CCNC: 7 U/L (ref 7–45)
ANION GAP SERPL CALC-SCNC: 11 MMOL/L (ref 10–20)
APPEARANCE UR: CLEAR
AST SERPL W P-5'-P-CCNC: 19 U/L (ref 9–39)
BASOPHILS # BLD AUTO: 0.02 X10*3/UL (ref 0–0.1)
BASOPHILS NFR BLD AUTO: 0.3 %
BILIRUB SERPL-MCNC: 0.5 MG/DL (ref 0–1.2)
BILIRUB UR STRIP.AUTO-MCNC: NEGATIVE MG/DL
BUN SERPL-MCNC: 16 MG/DL (ref 6–23)
CALCIUM SERPL-MCNC: 8.9 MG/DL (ref 8.6–10.3)
CARDIAC TROPONIN I PNL SERPL HS: 25 NG/L (ref 0–13)
CARDIAC TROPONIN I PNL SERPL HS: 26 NG/L (ref 0–13)
CHLORIDE SERPL-SCNC: 103 MMOL/L (ref 98–107)
CO2 SERPL-SCNC: 26 MMOL/L (ref 21–32)
COLOR UR: NORMAL
CREAT SERPL-MCNC: 0.77 MG/DL (ref 0.5–1.05)
EGFRCR SERPLBLD CKD-EPI 2021: 71 ML/MIN/1.73M*2
EOSINOPHIL # BLD AUTO: 0.3 X10*3/UL (ref 0–0.4)
EOSINOPHIL NFR BLD AUTO: 3.9 %
ERYTHROCYTE [DISTWIDTH] IN BLOOD BY AUTOMATED COUNT: 12.9 % (ref 11.5–14.5)
GLUCOSE BLD MANUAL STRIP-MCNC: 100 MG/DL (ref 74–99)
GLUCOSE SERPL-MCNC: 97 MG/DL (ref 74–99)
GLUCOSE UR STRIP.AUTO-MCNC: NORMAL MG/DL
HCT VFR BLD AUTO: 39.3 % (ref 36–46)
HGB BLD-MCNC: 13.3 G/DL (ref 12–16)
IMM GRANULOCYTES # BLD AUTO: 0.02 X10*3/UL (ref 0–0.5)
IMM GRANULOCYTES NFR BLD AUTO: 0.3 % (ref 0–0.9)
KETONES UR STRIP.AUTO-MCNC: NEGATIVE MG/DL
LEUKOCYTE ESTERASE UR QL STRIP.AUTO: NEGATIVE
LYMPHOCYTES # BLD AUTO: 2.07 X10*3/UL (ref 0.8–3)
LYMPHOCYTES NFR BLD AUTO: 27.1 %
MAGNESIUM SERPL-MCNC: 1.86 MG/DL (ref 1.6–2.4)
MCH RBC QN AUTO: 32 PG (ref 26–34)
MCHC RBC AUTO-ENTMCNC: 33.8 G/DL (ref 32–36)
MCV RBC AUTO: 95 FL (ref 80–100)
MONOCYTES # BLD AUTO: 0.76 X10*3/UL (ref 0.05–0.8)
MONOCYTES NFR BLD AUTO: 9.9 %
NEUTROPHILS # BLD AUTO: 4.47 X10*3/UL (ref 1.6–5.5)
NEUTROPHILS NFR BLD AUTO: 58.5 %
NITRITE UR QL STRIP.AUTO: NEGATIVE
NRBC BLD-RTO: 0 /100 WBCS (ref 0–0)
PH UR STRIP.AUTO: 7.5 [PH]
PLATELET # BLD AUTO: 231 X10*3/UL (ref 150–450)
POTASSIUM SERPL-SCNC: 4.7 MMOL/L (ref 3.5–5.3)
PROT SERPL-MCNC: 7.1 G/DL (ref 6.4–8.2)
PROT UR STRIP.AUTO-MCNC: NEGATIVE MG/DL
RBC # BLD AUTO: 4.16 X10*6/UL (ref 4–5.2)
RBC # UR STRIP.AUTO: NEGATIVE MG/DL
SODIUM SERPL-SCNC: 135 MMOL/L (ref 136–145)
SP GR UR STRIP.AUTO: 1.01
TSH SERPL-ACNC: 1.34 MIU/L (ref 0.44–3.98)
UROBILINOGEN UR STRIP.AUTO-MCNC: NORMAL MG/DL
WBC # BLD AUTO: 7.6 X10*3/UL (ref 4.4–11.3)

## 2025-06-04 PROCEDURE — 36415 COLL VENOUS BLD VENIPUNCTURE: CPT | Performed by: EMERGENCY MEDICINE

## 2025-06-04 PROCEDURE — G0378 HOSPITAL OBSERVATION PER HR: HCPCS

## 2025-06-04 PROCEDURE — 84484 ASSAY OF TROPONIN QUANT: CPT | Performed by: EMERGENCY MEDICINE

## 2025-06-04 PROCEDURE — 70450 CT HEAD/BRAIN W/O DYE: CPT

## 2025-06-04 PROCEDURE — 81003 URINALYSIS AUTO W/O SCOPE: CPT | Performed by: STUDENT IN AN ORGANIZED HEALTH CARE EDUCATION/TRAINING PROGRAM

## 2025-06-04 PROCEDURE — 96361 HYDRATE IV INFUSION ADD-ON: CPT

## 2025-06-04 PROCEDURE — 84443 ASSAY THYROID STIM HORMONE: CPT | Performed by: STUDENT IN AN ORGANIZED HEALTH CARE EDUCATION/TRAINING PROGRAM

## 2025-06-04 PROCEDURE — 80053 COMPREHEN METABOLIC PANEL: CPT | Performed by: EMERGENCY MEDICINE

## 2025-06-04 PROCEDURE — 96372 THER/PROPH/DIAG INJ SC/IM: CPT | Performed by: NURSE PRACTITIONER

## 2025-06-04 PROCEDURE — 82310 ASSAY OF CALCIUM: CPT | Performed by: EMERGENCY MEDICINE

## 2025-06-04 PROCEDURE — 2500000004 HC RX 250 GENERAL PHARMACY W/ HCPCS (ALT 636 FOR OP/ED): Performed by: STUDENT IN AN ORGANIZED HEALTH CARE EDUCATION/TRAINING PROGRAM

## 2025-06-04 PROCEDURE — 82947 ASSAY GLUCOSE BLOOD QUANT: CPT

## 2025-06-04 PROCEDURE — 70450 CT HEAD/BRAIN W/O DYE: CPT | Performed by: INTERNAL MEDICINE

## 2025-06-04 PROCEDURE — 2500000002 HC RX 250 W HCPCS SELF ADMINISTERED DRUGS (ALT 637 FOR MEDICARE OP, ALT 636 FOR OP/ED): Performed by: STUDENT IN AN ORGANIZED HEALTH CARE EDUCATION/TRAINING PROGRAM

## 2025-06-04 PROCEDURE — 85025 COMPLETE CBC W/AUTO DIFF WBC: CPT | Performed by: EMERGENCY MEDICINE

## 2025-06-04 PROCEDURE — 2500000004 HC RX 250 GENERAL PHARMACY W/ HCPCS (ALT 636 FOR OP/ED): Performed by: NURSE PRACTITIONER

## 2025-06-04 PROCEDURE — 99285 EMERGENCY DEPT VISIT HI MDM: CPT | Mod: 25 | Performed by: STUDENT IN AN ORGANIZED HEALTH CARE EDUCATION/TRAINING PROGRAM

## 2025-06-04 PROCEDURE — 99222 1ST HOSP IP/OBS MODERATE 55: CPT | Performed by: NURSE PRACTITIONER

## 2025-06-04 PROCEDURE — 93005 ELECTROCARDIOGRAM TRACING: CPT

## 2025-06-04 PROCEDURE — 83735 ASSAY OF MAGNESIUM: CPT | Performed by: EMERGENCY MEDICINE

## 2025-06-04 RX ORDER — MECLIZINE HYDROCHLORIDE 25 MG/1
25 TABLET ORAL 2 TIMES DAILY PRN
Status: DISCONTINUED | OUTPATIENT
Start: 2025-06-04 | End: 2025-06-05 | Stop reason: HOSPADM

## 2025-06-04 RX ORDER — ONDANSETRON HYDROCHLORIDE 2 MG/ML
4 INJECTION, SOLUTION INTRAVENOUS ONCE
Status: DISCONTINUED | OUTPATIENT
Start: 2025-06-04 | End: 2025-06-05 | Stop reason: HOSPADM

## 2025-06-04 RX ORDER — ENOXAPARIN SODIUM 100 MG/ML
40 INJECTION SUBCUTANEOUS EVERY 24 HOURS
Status: DISCONTINUED | OUTPATIENT
Start: 2025-06-04 | End: 2025-06-05 | Stop reason: HOSPADM

## 2025-06-04 RX ORDER — MECLIZINE HYDROCHLORIDE 25 MG/1
25 TABLET ORAL ONCE
Status: COMPLETED | OUTPATIENT
Start: 2025-06-04 | End: 2025-06-04

## 2025-06-04 RX ORDER — CALCIUM CARBONATE/VITAMIN D3 600MG-5MCG
1 TABLET ORAL DAILY
COMMUNITY

## 2025-06-04 RX ORDER — ACETAMINOPHEN 325 MG/1
650 TABLET ORAL EVERY 8 HOURS PRN
Status: DISCONTINUED | OUTPATIENT
Start: 2025-06-04 | End: 2025-06-05 | Stop reason: HOSPADM

## 2025-06-04 RX ORDER — POLYETHYLENE GLYCOL 3350 17 G/17G
17 POWDER, FOR SOLUTION ORAL DAILY PRN
Status: DISCONTINUED | OUTPATIENT
Start: 2025-06-04 | End: 2025-06-05 | Stop reason: HOSPADM

## 2025-06-04 RX ORDER — SODIUM CHLORIDE 9 MG/ML
75 INJECTION, SOLUTION INTRAVENOUS CONTINUOUS
Status: DISCONTINUED | OUTPATIENT
Start: 2025-06-04 | End: 2025-06-05 | Stop reason: HOSPADM

## 2025-06-04 RX ADMIN — ENOXAPARIN SODIUM 40 MG: 40 INJECTION SUBCUTANEOUS at 20:59

## 2025-06-04 RX ADMIN — SODIUM CHLORIDE 500 ML: 0.9 INJECTION, SOLUTION INTRAVENOUS at 16:31

## 2025-06-04 RX ADMIN — MECLIZINE HYDROCHLORIDE 25 MG: 25 TABLET ORAL at 13:37

## 2025-06-04 RX ADMIN — MECLIZINE HYDROCHLORIDE 25 MG: 25 TABLET ORAL at 16:30

## 2025-06-04 RX ADMIN — SODIUM CHLORIDE 75 ML/HR: 0.9 INJECTION, SOLUTION INTRAVENOUS at 20:59

## 2025-06-04 SDOH — ECONOMIC STABILITY: INCOME INSECURITY: IN THE PAST 12 MONTHS HAS THE ELECTRIC, GAS, OIL, OR WATER COMPANY THREATENED TO SHUT OFF SERVICES IN YOUR HOME?: NO

## 2025-06-04 SDOH — SOCIAL STABILITY: SOCIAL INSECURITY: HAS ANYONE EVER THREATENED TO HURT YOUR FAMILY OR YOUR PETS?: NO

## 2025-06-04 SDOH — SOCIAL STABILITY: SOCIAL INSECURITY: WITHIN THE LAST YEAR, HAVE YOU BEEN HUMILIATED OR EMOTIONALLY ABUSED IN OTHER WAYS BY YOUR PARTNER OR EX-PARTNER?: NO

## 2025-06-04 SDOH — SOCIAL STABILITY: SOCIAL INSECURITY: WITHIN THE LAST YEAR, HAVE YOU BEEN AFRAID OF YOUR PARTNER OR EX-PARTNER?: NO

## 2025-06-04 SDOH — ECONOMIC STABILITY: FOOD INSECURITY: WITHIN THE PAST 12 MONTHS, YOU WORRIED THAT YOUR FOOD WOULD RUN OUT BEFORE YOU GOT THE MONEY TO BUY MORE.: NEVER TRUE

## 2025-06-04 SDOH — SOCIAL STABILITY: SOCIAL INSECURITY
WITHIN THE LAST YEAR, HAVE YOU BEEN RAPED OR FORCED TO HAVE ANY KIND OF SEXUAL ACTIVITY BY YOUR PARTNER OR EX-PARTNER?: NO

## 2025-06-04 SDOH — ECONOMIC STABILITY: FOOD INSECURITY: WITHIN THE PAST 12 MONTHS, THE FOOD YOU BOUGHT JUST DIDN'T LAST AND YOU DIDN'T HAVE MONEY TO GET MORE.: NEVER TRUE

## 2025-06-04 SDOH — SOCIAL STABILITY: SOCIAL INSECURITY: DO YOU FEEL ANYONE HAS EXPLOITED OR TAKEN ADVANTAGE OF YOU FINANCIALLY OR OF YOUR PERSONAL PROPERTY?: NO

## 2025-06-04 SDOH — SOCIAL STABILITY: SOCIAL INSECURITY: WERE YOU ABLE TO COMPLETE ALL THE BEHAVIORAL HEALTH SCREENINGS?: YES

## 2025-06-04 SDOH — SOCIAL STABILITY: SOCIAL INSECURITY: ABUSE: ADULT

## 2025-06-04 SDOH — SOCIAL STABILITY: SOCIAL INSECURITY: ARE THERE ANY APPARENT SIGNS OF INJURIES/BEHAVIORS THAT COULD BE RELATED TO ABUSE/NEGLECT?: NO

## 2025-06-04 SDOH — SOCIAL STABILITY: SOCIAL INSECURITY: DO YOU FEEL UNSAFE GOING BACK TO THE PLACE WHERE YOU ARE LIVING?: NO

## 2025-06-04 SDOH — SOCIAL STABILITY: SOCIAL INSECURITY: DOES ANYONE TRY TO KEEP YOU FROM HAVING/CONTACTING OTHER FRIENDS OR DOING THINGS OUTSIDE YOUR HOME?: NO

## 2025-06-04 SDOH — SOCIAL STABILITY: SOCIAL INSECURITY: HAVE YOU HAD THOUGHTS OF HARMING ANYONE ELSE?: NO

## 2025-06-04 SDOH — SOCIAL STABILITY: SOCIAL INSECURITY: ARE YOU OR HAVE YOU BEEN THREATENED OR ABUSED PHYSICALLY, EMOTIONALLY, OR SEXUALLY BY ANYONE?: NO

## 2025-06-04 ASSESSMENT — ACTIVITIES OF DAILY LIVING (ADL)
WALKS IN HOME: INDEPENDENT
TOILETING: INDEPENDENT
HEARING - LEFT EAR: FUNCTIONAL
BATHING: INDEPENDENT
DRESSING YOURSELF: INDEPENDENT
GROOMING: INDEPENDENT
HEARING - RIGHT EAR: FUNCTIONAL
PATIENT'S MEMORY ADEQUATE TO SAFELY COMPLETE DAILY ACTIVITIES?: YES
FEEDING YOURSELF: INDEPENDENT
JUDGMENT_ADEQUATE_SAFELY_COMPLETE_DAILY_ACTIVITIES: YES
LACK_OF_TRANSPORTATION: NO
ADEQUATE_TO_COMPLETE_ADL: YES

## 2025-06-04 ASSESSMENT — PATIENT HEALTH QUESTIONNAIRE - PHQ9
SUM OF ALL RESPONSES TO PHQ9 QUESTIONS 1 & 2: 0
1. LITTLE INTEREST OR PLEASURE IN DOING THINGS: NOT AT ALL
2. FEELING DOWN, DEPRESSED OR HOPELESS: NOT AT ALL

## 2025-06-04 ASSESSMENT — COGNITIVE AND FUNCTIONAL STATUS - GENERAL
HELP NEEDED FOR BATHING: A LITTLE
TOILETING: A LITTLE
DAILY ACTIVITIY SCORE: 20
DRESSING REGULAR UPPER BODY CLOTHING: A LITTLE
STANDING UP FROM CHAIR USING ARMS: A LOT
CLIMB 3 TO 5 STEPS WITH RAILING: A LOT
DRESSING REGULAR LOWER BODY CLOTHING: A LITTLE
PATIENT BASELINE BEDBOUND: NO
MOVING FROM LYING ON BACK TO SITTING ON SIDE OF FLAT BED WITH BEDRAILS: A LITTLE
WALKING IN HOSPITAL ROOM: A LOT
MOVING TO AND FROM BED TO CHAIR: A LITTLE
MOBILITY SCORE: 15
TURNING FROM BACK TO SIDE WHILE IN FLAT BAD: A LITTLE

## 2025-06-04 ASSESSMENT — PAIN SCALES - GENERAL
PAINLEVEL_OUTOF10: 0 - NO PAIN

## 2025-06-04 ASSESSMENT — PAIN - FUNCTIONAL ASSESSMENT: PAIN_FUNCTIONAL_ASSESSMENT: 0-10

## 2025-06-04 ASSESSMENT — COLUMBIA-SUICIDE SEVERITY RATING SCALE - C-SSRS
2. HAVE YOU ACTUALLY HAD ANY THOUGHTS OF KILLING YOURSELF?: NO
1. IN THE PAST MONTH, HAVE YOU WISHED YOU WERE DEAD OR WISHED YOU COULD GO TO SLEEP AND NOT WAKE UP?: NO
6. HAVE YOU EVER DONE ANYTHING, STARTED TO DO ANYTHING, OR PREPARED TO DO ANYTHING TO END YOUR LIFE?: NO

## 2025-06-04 ASSESSMENT — LIFESTYLE VARIABLES
AUDIT-C TOTAL SCORE: 0
HOW OFTEN DO YOU HAVE A DRINK CONTAINING ALCOHOL: NEVER
SKIP TO QUESTIONS 9-10: 1
HOW MANY STANDARD DRINKS CONTAINING ALCOHOL DO YOU HAVE ON A TYPICAL DAY: PATIENT DOES NOT DRINK
AUDIT-C TOTAL SCORE: 0
HOW OFTEN DO YOU HAVE 6 OR MORE DRINKS ON ONE OCCASION: NEVER

## 2025-06-04 NOTE — ED PROVIDER NOTES
History of Present Illness     History provided by: Patient and Family Member  Limitations to History: Confusion  External Records Reviewed with Brief Summary: Previous workup and evaluations for cardiac issues, previous imaging studies.    HPI:  Grace Ahn is a 89 y.o. female female who presents with a chief concern of dizziness when sitting upright.  She notes that when she stood up this morning when trying to get up she was feeling dizzy and lightheaded. She also told EMS that she had some nausea and fatigue.  Denies any recent chest pain, shortness of breath, fevers or chills, cough congestion rhinorrhea, sore throat.  Vision changes, numbness tingling weakness, abdominal pain, chest pain, back pain.  Overall states she has been feeling in her normal state of health.  No recent falls or trauma.    Physical Exam   Triage vitals:  T 36.6 °C (97.8 °F)  HR 59  BP (!) 186/53  RR 18  O2 97 % None (Room air)    General: Awake, alert, in no acute distress  Eyes: Gaze conjugate.  No scleral icterus or injection.  No nystagmus.  TMs bilaterally no obvious infection, wax impaction, perforated eardrum.  Hearing grossly intact.  HENT: Normo-cephalic, atraumatic. No stridor  CV: Regular rate, regular rhythm.  Systolic murmur. radial pulses 2+ bilaterally  Resp: Breathing non-labored, speaking in full sentences.  Clear to auscultation bilaterally  GI: Soft, non-distended, non-tender. No rebound or guarding.  MSK/Extremities: No gross bony deformities. Moving all extremities  Skin: Warm. Appropriate color  Neuro: Alert. Oriented. Face symmetric. Speech is fluent.  Gross strength and sensation intact in b/l UE and LEs.  Normal finger-nose.  When sitting upright becomes too dizzy.  Keshia-Hallpike induced vertiginous symptoms and nausea.  No ataxia.  No dysdiadochokinesis.  Psych: Appropriate mood and affect    Medical Decision Making & ED Course   ED Course:  ED Course as of 06/05/25 2112 Wed Jun 04, 2025   1340 Troponin  I, High Sensitivity(!): 25  Baseline [KK]   1340 Patient is a 89-year-old female who presents with a chief concern of dizziness when sitting upright.  She notes that when she stood up this morning when trying to get up she was feeling dizzy and lightheaded. [KK]   1340   She also told EMS that she had some nausea and fatigue.  Denies any recent chest pain, shortness of breath, fevers or chills, cough congestion rhinorrhea, sore throat.  Vision changes, numbness tingling weakness, abdominal pain, chest pain, back pain.  Overall states she has been feeling in her normal state of health.  No recent falls or trauma.    On exam, patient resting comfortably in no acute distress.  When trying to sit her upright to listen to her lungs, she got extremely dizzy.  No nystagmus, neurological exam unremarkable.  Heart lungs clear to auscultation.  Did have a drop in her blood pressure from weight in 80s systolic to 160s systolic when doing so.  No pitting edema bilateral lower extremities.    Differential diagnosis includes but limited to orthostatic hypotension, underlying infection, thyroid disease, electrolyte disturbance, could also consider intracranial abnormality though extremely unlikely.  But with her dizziness could consider intracranial process. [KK]   1452 CT head wo IV contrast  IMPRESSION:  1. No acute intracranial abnormality is evident.  2. Increased layering opacification in the left maxillary sinus with  the additional areas of cystic changes, please correlate for  sinusitis.  3. Additional chronic and incidental findings as detailed above.   [KK]   1612 Attempted [place]-Hallpike, patient became significantly dizzy and nauseous exacerbating her symptoms.  Once put back into a position of comfort her symptoms resolved.  With this testing being grossly positive patient has peripheral vertigo.  Provided additional Antivert as well as Zofran.  Will assess urinalysis for possible UTI causing the underlying dehydration  leading to BPPV. [KK]      ED Course User Index  [KK] Clinton Niño DO         Diagnoses as of 25   Vertigo   Dizziness       Medical Decision Makin y.o. female presented with concerns of dizziness.  Workup and evaluation reveals significant reproduction of her symptoms with [place]-Hallpike maneuver.  Had improvement with meclizine.  Urine showing ketones but no infection, patient notes she has not been drinking much water or any fluids.  I suspect she is likely dehydrated causing peripheral vertigo.  Patient will require observation due to continued slight dizziness preventing her from ambulating.  Will continue to hydrate via p.o. as well as provide her food.  She would benefit from additional meclizine as well.  ----    Discussed the case with the admitting physician who agrees with current management and accepted the patient for observation.  Patient stable at the time of observation.    Differential diagnoses considered include but are not limited to: see ED Course     EKG Independent Interpretation: Sinus bradycardia, first-degree AV block, 59 bpm, 222 NC, 102 QRS, 445 QTc.  Left axis deviation, no ST segment elevation or depression.  Does have an incomplete right bundle branch block.  No T wave inversions.  Similar in appearance to previous EKG on 2024 and 2024    Independent Result Review and Interpretation: see ED course     Chronic conditions affecting the patient's care: as documented in ED course/MDM    The patient was discussed with the following consultants/services: see ED course    Care Considerations: as documented in ED course/MDM      Disposition   As a result of their workup, the patient will require observation to the hospital.  The patient was informed of her diagnosis.  The patient was given the opportunity to ask questions and I answered them. The patient agreed to be admitted to the hospital.    Procedures   Procedures       Clinton Niño DO  [phone]       Clinton  DO [place]  06/05/25 0363

## 2025-06-04 NOTE — PROGRESS NOTES
Pharmacy Medication History     Source of Information: Per daughter Josephine     Additional concerns with the patient's PTA list.   Patient says not taking anything but verified with the daughter she is taking the items listed.   Notified Provider via Haiku : No    The following updates were made to the Prior to Admission medication list:     Medications ADDED:   Artificial tears   Medications CHANGED:  N/a  Medications REMOVED:   N/a  Medications NOT TAKING:   N/a    Allergy reviewed : Yes    Meds 2 Beds : Yes    Outpatient pharmacy confirmed and updated in chart : Yes    [facility]     The list below reflectives the updated PTA list. Please review each medication in order reconciliation for additional clarification and justification.    Prior to Admission Medications   Prescriptions Last Dose   acetaminophen (Tylenol) 325 mg tablet    Sig: Take 2 tablets (650 mg) by mouth every 8 hours if needed for moderate pain (4 - 6).   albuterol (ProAir RespiClick) 90 mcg/actuation aerosol powdr breath activated inhaler    Sig: Inhale 2 puffs every 6 hours if needed for wheezing.   calcium carbonate-vitamin D3 600 mg-5 mcg (200 unit) tablet    Sig: Take 1 tablet by mouth once daily.   cholecalciferol (Vitamin D-3) 10 MCG (400 UNIT) tablet    Sig: Take 1 tablet (10 mcg) by mouth once daily.   denosumab (Prolia) 60 mg/mL syringe    Sig: Inject 1 mL (60 mg total) under the skin every 6 months.   dextran 70-hypromellose, PF, (Bion Tears) 0.1-0.3 % ophthalmic solution    Sig: Administer 1 drop into both eyes 2 times a day. And as needed   estradiol (Estrace) 0.01 % (0.1 mg/gram) vaginal cream    Sig: Insert a pea-sized amount into vagina three times per week at bedtime   Patient taking differently: Insert 0.125 Applicatorfuls (0.5 g) into the vagina 3 times a week. Insert a pea-sized amount into vagina three times per week at bedtime   mupirocin (Bactroban) 2 % ointment    Sig: Apply topically 3 times a day. apply  to affected areas of infection for 5 days   Patient taking differently: Apply 1 Application topically once daily as needed (wound care). apply to affected areas of infection for 5 days   mv,Ca,min-folic acid-vit K1 (One-A-Day Women's 50 Plus) 400-20 mcg tablet    Sig: Take 1 tablet by mouth once daily.   triamcinolone (Kenalog) 0.1 % cream    Sig: Apply topically 2 times a day as needed for rash. Apply to affected area 1-2 times daily as needed.   Patient taking differently: Apply 1 Application topically 2 times a day as needed for rash. Apply to affected area 1-2 times daily as needed.      Facility-Administered Medications: None       The list below reflectives the updated allergy list. Please review each documented allergy for additional clarification and justification.    Allergies   Allergen Reactions    Bacitracin-Polymyxin B Unknown    Latex Other    Penicillins Hives    Adhesive Tape-Silicones Rash    Bacitracin Rash    Cephalosporins Hives and Rash    Chlorine Rash    Ciprofloxacin Rash and Unknown    Doxycycline Rash    Neomycin Rash    Omeprazole Rash    Pantoprazole Rash    Sulfamethoxazole Rash          06/04/25 at 7:20 PM - Leta Liu

## 2025-06-04 NOTE — H&P
"  HPI: Grace Ahn is a 89 y.o. female, with a PMH of dementia, and obstructive sleep apnea, HTN, Osteopenia, urinary issues, hx of syncope  and vertigo, who presented to [facility] ED on 6/4/2025 for dizziness. Per pt she had some nausea and fatigue started about 1 day ago. Pt sates no appetite over last day. She denies pain.   Pt states she resides in an independent living in [place] and completing all her daily task by herself.  At this time pt is awake and states \" I feel much better\", states I would like some toast.  She denies fever, chills, denies any recent falls or trauma.    ED Course:    VSS  EKG- Sinus bryan HR 58  LABS Trop elevated  UA- negative  CT head:  No acute intracranial abnormality is evident.  Increased layering opacification in the left maxillary sinus with the additional areas of cystic changes, please correlate for sinusitis.  Additional chronic and incidental findings as detailed above.        Patient History   PMH: She has a past medical history of Acute cystitis without hematuria (02/10/2020), Adverse effect of unspecified drugs, medicaments and biological substances, initial encounter (09/09/2021), Allergic (1945), Delusional disorders (11/02/2021), Laceration without foreign body, left lower leg, initial encounter (03/15/2020), Other fracture of left patella, initial encounter for closed fracture (02/16/2020), Personal history of other specified conditions (02/24/2020), Personal history of urinary (tract) infections (12/08/2021), and Unspecified fracture of left patella, initial encounter for closed fracture (02/06/2020).  PSH: She has a past surgical history that includes Other surgical history (12/10/2019); Other surgical history (12/10/2019); Other surgical history (12/10/2019); Other surgical history (12/10/2019); Other surgical history (12/10/2019); Other surgical history (11/02/2021); Other surgical history (02/23/2022); Other surgical history (02/23/2022); Other surgical history " "(02/23/2022); Other surgical history (02/23/2022); MR angio head wo IV contrast (07/25/2022); MR angio neck wo IV contrast (07/25/2022); Appendectomy (1935); Eye surgery (2020); and Hysterectomy (1977).  SH: She reports that she has never smoked. She has never used smokeless tobacco. She reports that she does not currently use alcohol. She reports that she does not use drugs.  FH: family history includes Cancer in her father.     RX Allergies[1]  Current Outpatient Medications   Medication Instructions    acetaminophen (TYLENOL) 650 mg, Every 8 hours PRN    albuterol (ProAir RespiClick) 90 mcg/actuation aerosol powdr breath activated inhaler 2 puffs, inhalation, Every 6 hours PRN    calcium carbonate-vitamin D3 600 mg-5 mcg (200 unit) tablet 1 tablet, oral, Daily    cholecalciferol (Vitamin D-3) 10 MCG (400 UNIT) tablet 1 tablet, Daily    denosumab (PROLIA) 60 mg, subcutaneous, Every 6 months    dextran 70-hypromellose, PF, (Bion Tears) 0.1-0.3 % ophthalmic solution 1 drop, Both Eyes, 2 times daily, And as needed    estradiol (Estrace) 0.01 % (0.1 mg/gram) vaginal cream Insert a pea-sized amount into vagina three times per week at bedtime    mupirocin (Bactroban) 2 % ointment Topical, 3 times daily, apply to affected areas of infection for 5 days    mv,Ca,min-folic acid-vit K1 (One-A-Day Women's 50 Plus) 400-20 mcg tablet 1 tablet, Daily    triamcinolone (Kenalog) 0.1 % cream Topical, 2 times daily PRN, Apply to affected area 1-2 times daily as needed.     Review of Systems   ROS: 10-point review of systems was performed and is otherwise negative except as noted in HPI.        Heart Rate:  [52-78]   Temperature:  [36.6 °C (97.8 °F)]   Respirations:  [11-23]   BP: (156-186)/(41-87)   Height:  [154.9 cm (5' 1\")]   Weight:  [56 kg (123 lb 7.4 oz)]   Pulse Ox:  [95 %-98 %]      0-10 (Numeric) Pain Score: 0 - No pain   Vitals:    06/04/25 1253   Weight: 56 kg (123 lb 7.4 oz)        Physical Exam:  Vitals and nursing " notes reviewed.  GENERAL: Alert and awake, cooperative; in no acute distress  SKIN: Warm and dry, cap refill <2  HEENT: Normocephalic, PEERL, mucous membranes pink and moist  CARDIAC: Regular rate and rhythm, S1S2, no murmurs or abnormal heart sounds  CHEST: Normal respiratory effort, no abnormal breath sounds  ABDOMEN: soft, non-distended, non-tender with palpation  EXTREMITIES: No lower extremity edema, normal pulses all 4 extremities  NEURO: Alert and oriented, mental status at baseline, no focal deficits  PSYCH: Behavior and affect as expected     Medications  Scheduled Medications[2]Continuous Medications[3]PRN Medications[4]    Diagnostic Results   CBC- 2025: 12:59 PM  7.6 13.3 231    39.3      BMP- 2025: 12:59 PM  135 16 _ 97   4.7 0.77 26    Estimated Creatinine Clearance: 33 mL/min (by C-G formula based on SCr of 0.77 mg/dL).     CA: 8.9 PROTIEN: 7.1 ALT: 7 Total Bili: 0.5 M.86   PHOS: _ ALBUMIN: 3.8 AST: 19   Alk Phos: 65      COAGS- No results in last year.  _   _ _     CV Labs  Troponin I, High Sensitivity   Date/Time Value Ref Range Status   2025 01:40 PM 26 (H) 0 - 13 ng/L Final   2025 12:59 PM 25 (H) 0 - 13 ng/L Final   2024 02:19 PM 22 (H) 0 - 13 ng/L Final   2024 09:25 AM 23 (H) 0 - 13 ng/L Final     Troponin I   Date/Time Value Ref Range Status   2023 03:45 PM 18 (H) 0 - 13 ng/L Final     Comment:     .  Less than 99th percentile of normal range cutoff-  Female and children under 18 years old <14 ng/L; Male <21 ng/L: Negative  Repeat testing should be performed if clinically indicated.   .  Female and children under 18 years old 14-50 ng/L; Male 21-50 ng/L:  Consistent with possible cardiac damage and possible increased clinical   risk. Serial measurements may help to assess extent of myocardial damage.   .  >50 ng/L: Consistent with cardiac damage, increased clinical risk and  myocardial infarction. Serial measurements may help assess extent of    myocardial damage.   .   NOTE: Children less than 1 year old may have higher baseline troponin   levels and results should be interpreted in conjunction with the overall   clinical context.   .  NOTE: Troponin I testing is performed using a different   testing methodology at [facility] ([facility]3] than at other   [facility]. Direct result comparisons should only   be made within the same method.     02/08/2023 01:50 PM 18 (H) 0 - 13 ng/L Final     Comment:     .  Less than 99th percentile of normal range cutoff-  Female and children under 18 years old <14 ng/L; Male <21 ng/L: Negative  Repeat testing should be performed if clinically indicated.   .  Female and children under 18 years old 14-50 ng/L; Male 21-50 ng/L:  Consistent with possible cardiac damage and possible increased clinical   risk. Serial measurements may help to assess extent of myocardial damage.   .  >50 ng/L: Consistent with cardiac damage, increased clinical risk and  myocardial infarction. Serial measurements may help assess extent of   myocardial damage.   .   NOTE: Children less than 1 year old may have higher baseline troponin   levels and results should be interpreted in conjunction with the overall   clinical context.   .  NOTE: Troponin I testing is performed using a different   testing methodology at [facility]3] than at other   [facility]. Direct result comparisons should only   be made within the same method.     07/23/2022 01:19 PM 10 0 - 13 ng/L Final     Comment:     .  Less than 99th percentile of normal range cutoff-  Female and children under 18 years old <14 ng/L; Male <21 ng/L: Negative  Repeat testing should be performed if clinically indicated.   .  Female and children under 18 years old 14-50 ng/L; Male 21-50 ng/L:  Consistent with possible cardiac damage and possible increased clinical   risk. Serial measurements may help to assess extent of myocardial damage.   .  >50 ng/L: Consistent with cardiac  damage, increased clinical risk and  myocardial infarction. Serial measurements may help assess extent of   myocardial damage.   .   NOTE: Children less than 1 year old may have higher baseline troponin   levels and results should be interpreted in conjunction with the overall   clinical context.   .  NOTE: Troponin I testing is performed using a different   testing methodology at [facility] than at other   [facility]. Direct result comparisons should only   be made within the same method.     07/23/2022 11:54 AM 14 (H) 0 - 13 ng/L Final     Comment:     .  Less than 99th percentile of normal range cutoff-  Female and children under 18 years old <14 ng/L; Male <21 ng/L: Negative  Repeat testing should be performed if clinically indicated.   .  Female and children under 18 years old 14-50 ng/L; Male 21-50 ng/L:  Consistent with possible cardiac damage and possible increased clinical   risk. Serial measurements may help to assess extent of myocardial damage.   .  >50 ng/L: Consistent with cardiac damage, increased clinical risk and  myocardial infarction. Serial measurements may help assess extent of   myocardial damage.   .   NOTE: Children less than 1 year old may have higher baseline troponin   levels and results should be interpreted in conjunction with the overall   clinical context.   .  NOTE: Troponin I testing is performed using a different   testing methodology at [facility] than at other   [facility]. Direct result comparisons should only   be made within the same method.       Hemoglobin A1C   Date/Time Value Ref Range Status   11/05/2024 02:32 PM 5.7 (H) See comment % Final   01/16/2024 11:46 AM 5.8 (H) see below % Final       HPI: Grace Ahn is a 89 y.o. female, with a PMH of dementia, and obstructive sleep apnea, HTN, Osteopenia, urinary issues, hx of syncope  and vertigo, who presented to [facility] ED on 6/4/2025 for dizziness. Per pt she had some nausea and  "fatigue started about 1 day ago. Pt sates no appetite over last day. She denies pain.   Pt states she resides in an independent living in [place] and completing all her daily task by herself.  At this time pt is awake and states \" I feel much better\", states I would like some toast.  She denies fever, chills, denies any recent falls or trauma.      Assessment/Plan     Dizziness possible related to dehydration  vertigo  Nausea 2/2 dizziness  Suspect peripheral vertigo, with hx and responded well to meclizine consider PT/OT in AM if symptoms persist  - continue PRN meclizine   - monitor telemetry  - fall precautions   - orthostatic BP  - IV  fluids    Fatigue possible related to dehydration  - IV fluids  - encourage PO intake    Elevated trop- no cardiac complaints pt denies Chest pain  - Appears chronically elevated  - trend trop       GI/VTE PPX: PPI, SQ  Lovenox       Chart, medical history, and labs/testing reviewed in detail.   Case and plan of care to be discussed with reviewed by lead NP.      Disposition: [place] home to  [place]  once medically cleared and stable, pending reassess and labs in AM    BRISA Moya-CNP   Observation/Internal Med [facility]  [facility]  06/04/25  7:36 PM  Total time of 60 minutes spent on professional and overall care, with >50% of time dedicated to counseling/coordination of care.         [1]   Allergies  Allergen Reactions    Bacitracin-Polymyxin B Unknown    Latex Other    Penicillins Hives    Adhesive Tape-Silicones Rash    Bacitracin Rash    Cephalosporins Hives and Rash    Chlorine Rash    Ciprofloxacin Rash and Unknown    Doxycycline Rash    Neomycin Rash    Omeprazole Rash    Pantoprazole Rash    Sulfamethoxazole Rash   [2] ondansetron, 4 mg, intravenous, Once  [3] sodium chloride 0.9%, 75 mL/hr  [4]   "

## 2025-06-04 NOTE — ED TRIAGE NOTES
Pt here by EMS from independent living for fatigue/dizziness/nausea since this morning    Pt denies cp/sob/fevers/cough/blurred vision/abd pain   A&ox4

## 2025-06-05 VITALS
SYSTOLIC BLOOD PRESSURE: 141 MMHG | HEIGHT: 61 IN | HEART RATE: 72 BPM | BODY MASS INDEX: 23.31 KG/M2 | OXYGEN SATURATION: 96 % | WEIGHT: 123.46 LBS | RESPIRATION RATE: 17 BRPM | DIASTOLIC BLOOD PRESSURE: 59 MMHG | TEMPERATURE: 97.2 F

## 2025-06-05 LAB
ALBUMIN SERPL BCP-MCNC: 3.8 G/DL (ref 3.4–5)
ALP SERPL-CCNC: 64 U/L (ref 33–136)
ALT SERPL W P-5'-P-CCNC: 8 U/L (ref 7–45)
ANION GAP SERPL CALC-SCNC: 12 MMOL/L (ref 10–20)
AST SERPL W P-5'-P-CCNC: 16 U/L (ref 9–39)
ATRIAL RATE: 59 BPM
BILIRUB SERPL-MCNC: 0.5 MG/DL (ref 0–1.2)
BUN SERPL-MCNC: 14 MG/DL (ref 6–23)
CALCIUM SERPL-MCNC: 8.6 MG/DL (ref 8.6–10.3)
CARDIAC TROPONIN I PNL SERPL HS: 26 NG/L (ref 0–13)
CHLORIDE SERPL-SCNC: 105 MMOL/L (ref 98–107)
CO2 SERPL-SCNC: 26 MMOL/L (ref 21–32)
CREAT SERPL-MCNC: 0.63 MG/DL (ref 0.5–1.05)
EGFRCR SERPLBLD CKD-EPI 2021: 82 ML/MIN/1.73M*2
ERYTHROCYTE [DISTWIDTH] IN BLOOD BY AUTOMATED COUNT: 12.7 % (ref 11.5–14.5)
GLUCOSE SERPL-MCNC: 89 MG/DL (ref 74–99)
HCT VFR BLD AUTO: 39.9 % (ref 36–46)
HGB BLD-MCNC: 13.3 G/DL (ref 12–16)
MCH RBC QN AUTO: 32.3 PG (ref 26–34)
MCHC RBC AUTO-ENTMCNC: 33.3 G/DL (ref 32–36)
MCV RBC AUTO: 97 FL (ref 80–100)
NRBC BLD-RTO: 0 /100 WBCS (ref 0–0)
P AXIS: 84 DEGREES
P OFFSET: 162 MS
P ONSET: 100 MS
PLATELET # BLD AUTO: 243 X10*3/UL (ref 150–450)
POTASSIUM SERPL-SCNC: 4.4 MMOL/L (ref 3.5–5.3)
PR INTERVAL: 222 MS
PROT SERPL-MCNC: 6.6 G/DL (ref 6.4–8.2)
Q ONSET: 211 MS
QRS COUNT: 10 BEATS
QRS DURATION: 102 MS
QT INTERVAL: 450 MS
QTC CALCULATION(BAZETT): 445 MS
QTC FREDERICIA: 447 MS
R AXIS: -70 DEGREES
RBC # BLD AUTO: 4.12 X10*6/UL (ref 4–5.2)
SODIUM SERPL-SCNC: 139 MMOL/L (ref 136–145)
T AXIS: 78 DEGREES
T OFFSET: 436 MS
VENTRICULAR RATE: 59 BPM
WBC # BLD AUTO: 7.6 X10*3/UL (ref 4.4–11.3)

## 2025-06-05 PROCEDURE — 2500000001 HC RX 250 WO HCPCS SELF ADMINISTERED DRUGS (ALT 637 FOR MEDICARE OP): Performed by: HOSPITALIST

## 2025-06-05 PROCEDURE — 80053 COMPREHEN METABOLIC PANEL: CPT | Performed by: NURSE PRACTITIONER

## 2025-06-05 PROCEDURE — G0378 HOSPITAL OBSERVATION PER HR: HCPCS

## 2025-06-05 PROCEDURE — 84484 ASSAY OF TROPONIN QUANT: CPT | Performed by: NURSE PRACTITIONER

## 2025-06-05 PROCEDURE — 36415 COLL VENOUS BLD VENIPUNCTURE: CPT | Performed by: NURSE PRACTITIONER

## 2025-06-05 PROCEDURE — 96374 THER/PROPH/DIAG INJ IV PUSH: CPT | Mod: 59

## 2025-06-05 PROCEDURE — 2500000004 HC RX 250 GENERAL PHARMACY W/ HCPCS (ALT 636 FOR OP/ED): Performed by: HOSPITALIST

## 2025-06-05 PROCEDURE — 99238 HOSP IP/OBS DSCHRG MGMT 30/<: CPT | Performed by: INTERNAL MEDICINE

## 2025-06-05 PROCEDURE — 85027 COMPLETE CBC AUTOMATED: CPT | Performed by: NURSE PRACTITIONER

## 2025-06-05 RX ORDER — LISINOPRIL 5 MG/1
5 TABLET ORAL DAILY
Status: DISCONTINUED | OUTPATIENT
Start: 2025-06-05 | End: 2025-06-05 | Stop reason: HOSPADM

## 2025-06-05 RX ORDER — MECLIZINE HCL 12.5 MG 12.5 MG/1
12.5 TABLET ORAL 2 TIMES DAILY PRN
Qty: 10 TABLET | Refills: 0 | Status: SHIPPED | OUTPATIENT
Start: 2025-06-05

## 2025-06-05 RX ORDER — HYDRALAZINE HYDROCHLORIDE 20 MG/ML
10 INJECTION INTRAMUSCULAR; INTRAVENOUS EVERY 6 HOURS PRN
Status: DISCONTINUED | OUTPATIENT
Start: 2025-06-05 | End: 2025-06-05 | Stop reason: HOSPADM

## 2025-06-05 RX ADMIN — LISINOPRIL 5 MG: 5 TABLET ORAL at 04:33

## 2025-06-05 RX ADMIN — HYDRALAZINE HYDROCHLORIDE 10 MG: 20 INJECTION INTRAMUSCULAR; INTRAVENOUS at 05:38

## 2025-06-05 ASSESSMENT — COGNITIVE AND FUNCTIONAL STATUS - GENERAL
CLIMB 3 TO 5 STEPS WITH RAILING: A LOT
PERSONAL GROOMING: A LITTLE
STANDING UP FROM CHAIR USING ARMS: A LITTLE
TURNING FROM BACK TO SIDE WHILE IN FLAT BAD: A LITTLE
EATING MEALS: A LITTLE
WALKING IN HOSPITAL ROOM: A LITTLE
MOVING FROM LYING ON BACK TO SITTING ON SIDE OF FLAT BED WITH BEDRAILS: A LITTLE
DAILY ACTIVITIY SCORE: 17
MOVING TO AND FROM BED TO CHAIR: A LITTLE
TOILETING: A LITTLE
HELP NEEDED FOR BATHING: A LITTLE
DRESSING REGULAR UPPER BODY CLOTHING: A LITTLE
DRESSING REGULAR LOWER BODY CLOTHING: A LOT
MOBILITY SCORE: 17

## 2025-06-05 ASSESSMENT — PAIN SCALES - GENERAL: PAINLEVEL_OUTOF10: 0 - NO PAIN

## 2025-06-05 ASSESSMENT — ACTIVITIES OF DAILY LIVING (ADL): LACK_OF_TRANSPORTATION: NO

## 2025-06-05 NOTE — DISCHARGE INSTRUCTIONS
Patient getting discharged home with PT/OT and family is making arrangement for Private duty Aide at home.  You are ordered Meclizine as needed for dizziness.  Please stay hydrated at home and take enough time to get up and walk.

## 2025-06-05 NOTE — CARE PLAN
The patient's goals for the shift include      The clinical goals for the shift include Maintain safety throughout shift      Problem: Pain - Adult  Goal: Verbalizes/displays adequate comfort level or baseline comfort level  Outcome: Progressing     Problem: Safety - Adult  Goal: Free from fall injury  Outcome: Progressing     Problem: Discharge Planning  Goal: Discharge to home or other facility with appropriate resources  Outcome: Progressing     Problem: Chronic Conditions and Co-morbidities  Goal: Patient's chronic conditions and co-morbidity symptoms are monitored and maintained or improved  Outcome: Progressing     Problem: Nutrition  Goal: Nutrient intake appropriate for maintaining nutritional needs  Outcome: Progressing     Problem: Skin  Goal: Decreased wound size/increased tissue granulation at next dressing change  Outcome: Progressing  Goal: Participates in plan/prevention/treatment measures  Outcome: Progressing  Goal: Prevent/manage excess moisture  Outcome: Progressing  Goal: Prevent/minimize sheer/friction injuries  Outcome: Progressing  Goal: Promote/optimize nutrition  Outcome: Progressing  Goal: Promote skin healing  Outcome: Progressing     Problem: Fall/Injury  Goal: Not fall by end of shift  Outcome: Progressing  Goal: Be free from injury by end of the shift  Outcome: Progressing  Goal: Verbalize understanding of personal risk factors for fall in the hospital  Outcome: Progressing  Goal: Verbalize understanding of risk factor reduction measures to prevent injury from fall in the home  Outcome: Progressing  Goal: Use assistive devices by end of the shift  Outcome: Progressing  Goal: Pace activities to prevent fatigue by end of the shift  Outcome: Progressing

## 2025-06-05 NOTE — CARE PLAN
The patient's goals for the shift include  discharge today    The clinical goals for the shift include Free from falls and injuries

## 2025-06-05 NOTE — DISCHARGE SUMMARY
DISCHARGE SUMMARY    Admitting Provider: Jesus Monaco MD  Discharge Provider: Jesus Monaco MD  Primary Care Physician: Naima Quiñonez [phone]      Admission Date: 6/4/2025       Discharge Date: 6/5/2025  Discharge Disposition: Home  Code Status at Discharge: Full Code    Present on Admission:   Vertigo       Hospital Course   Grace Ahn is a 89 y.o. year old female, with a PMH of dementia, and obstructive sleep apnea, HTN, Osteopenia, urinary issues, hx of syncope  and vertigo , who presented to [facility] ED on 6/4/2025 for  for dizziness when sitting upright . ED course was notable for significant reproduction of her symptoms with Keshia-Hallpike maneuver. Had improvement with meclizine. Urine showing ketones but no infection, patient notes she has not been drinking much water or any fluids. I suspect she is likely dehydrated causing peripheral vertigo.  . Patient was admitted under observation status on 6/4/2025 for due to continued slight dizziness preventing her from ambulating . Hospital course as below.   EKG- Sinus bryan HR 58  LABS Trop chronically elevate  UA- negative  CT head:  No acute intracranial abnormality is evident.  Increased layering opacification in the left maxillary sinus with the additional areas of cystic changes, please correlate for sinusitis. Patient did not c/o of any sinusitis symptoms. Patient's labs and vitals were unremarkable. Patient did ambulate twice today with Nurse and Nurse Practitioner without any dizziness or light headedness  Additional chronic and incidental findings as detailed above.     Patient was discharged 06/05/25 in stable condition with planned outpatient FU with PCP. And Physical therapy referral. Patient's family getting private duty Aide at home for Patient.  Patient being discharged with proscription for Meclizine.     Objective    Discharge Vitals  Heart Rate: 72  Resp: 17  BP: 141/59  Temp: 36.2 °C (97.2 °F)  Weight: 56 kg  (123 lb 7.4 oz)    Physical Exam  Vitals and nursing notes reviewed.  GENERAL: Alert and awake, cooperative; in no acute distress  SKIN: Warm and dry, cap refill <2  HEENT: Normocephalic, PEERL, mucous membranes pink and moist  NECK: No JVD or hepatojugular reflex  CARDIAC: Regular rate and rhythm, S1S2, no murmurs or abnormal heart sounds  CHEST: Normal respiratory effort, no abnormal breath sounds  ABDOMEN: Soft, non-distended, non-tender with palpation  EXTREMITIES: No lower extremity edema, normal pulses all 4 extremities  NEURO: Alert and oriented, mental status at baseline, no focal deficits  PSYCH: Behavior and affect as expected     Issues Requiring Follow-up:   Test Results Pending at Discharge  Pending Labs       Order Current Status    Extra Urine Gray Tube Collected (06/04/25 7677)    Urinalysis with Reflex Culture and Microscopic In process            Discharge Medication List     Medication List      START taking these medications     meclizine 12.5 mg tablet; Commonly known as: Antivert; Take 1 tablet   (12.5 mg) by mouth 2 times a day as needed for dizziness or nausea.     CONTINUE taking these medications     acetaminophen 325 mg tablet; Commonly known as: Tylenol   calcium carbonate-vitamin D3 600 mg-5 mcg (200 unit) tablet   cholecalciferol 10 mcg (400 units) tablet; Commonly known as: Vitamin   D-3   denosumab 60 mg/mL syringe; Commonly known as: Prolia; Inject 1 mL (60   mg total) under the skin every 6 months.   dextran 70-hypromellose (PF) 0.1-0.3 % ophthalmic solution; Commonly   known as: Bion Tears   estradiol 0.01 % (0.1 mg/gram) vaginal cream; Commonly known as:   Estrace; Insert a pea-sized amount into vagina three times per week at   bedtime   mupirocin 2 % ointment; Commonly known as: Bactroban; Apply topically 3   times a day. apply to affected areas of infection for 5 days   One-A-Day Women's 50 Plus 400-20 mcg tablet; Generic drug:   mv,Ca,min-folic acid-vit K1   ProAir RespiClick  90 mcg/actuation aerosol powdr breath activated   inhaler; Generic drug: albuterol; Inhale 2 puffs every 6 hours if needed   for wheezing.   triamcinolone 0.1 % cream; Commonly known as: Kenalog; Apply topically 2   times a day as needed for rash. Apply to affected area 1-2 times daily as   needed.         Outpatient Follow-Up  Future Appointments   Date Time Provider Department Center   6/26/2025 10:30 AM Naima Quiñonez MD [id number] [facility]   7/15/2025  9:30 AM Meenakshi Pierce MD [id number] [facility]   7/24/2025 11:15 AM Leona Jones MD [id number] [facility]   8/19/2025  9:30 AM Tessy Brooks MD [id number] [facility]   11/13/2025 10:45 AM Leona Jones MD [id number] [facility]   11/20/2025 10:30 [facility]     - Patient instructed to FU with PCP in 1 week and Physical therapy referral placed    Chart, medical history, and labs/testing reviewed in detail.   Plan of care discussed and discharge plan agreed upon with staff attending, Dr. Monaco.     BRISA Sheridan-CNP   Observation/Internal Med [facility]  [facility]  06/05/25 4:14 PM    On day of discharge, vital signs were stable and no acute distress was noted. All questions were answered and much support was given. Patient/family verbalized understanding. After VS and labs were reviewed, it was determined the patient was stable for discharge. Hospital day of discharge management- spent >30 minutes coordinating the discharge and counseling/educating patient and family regarding discharge instructions.

## 2025-06-05 NOTE — PROGRESS NOTES
06/05/25 1245   Discharge Planning   Living Arrangements Alone   Support Systems Children   Assistance Needed None   Type of Residence Other (Comment)   Care Facility Name [facility]   Home or Post Acute Services None   Expected Discharge Disposition Home   Does the patient need discharge transport arranged? No   Financial Resource Strain   How hard is it for you to pay for the very basics like food, housing, medical care, and heating? Not hard   Housing Stability   In the last 12 months, was there a time when you were not able to pay the mortgage or rent on time? N   At any time in the past 12 months, were you homeless or living in a shelter (including now)? N   Transportation Needs   In the past 12 months, has lack of transportation kept you from medical appointments or from getting medications? no   In the past 12 months, has lack of transportation kept you from meetings, work, or from getting things needed for daily living? No     Family wants her to return to [facility].  Daughter is trying to organize private duty aides to help her.   Printed a list of agencies that offer private duty aides and gave to daughter.   Daughter thinks that an aide they used before may be able to help them.  I asked her to call the agency's on the list so we have a back up plan.  Daughter says she cannot stay with her mom and feels like she is unsafe to go home on her own.  Updated Zamzam and Clarita Arrieta. Will continue to follow for discharge planning needs.

## 2025-06-06 ENCOUNTER — PATIENT OUTREACH (OUTPATIENT)
Dept: PRIMARY CARE | Facility: CLINIC | Age: OVER 89
End: 2025-06-06
Payer: MEDICARE

## 2025-06-06 NOTE — PROGRESS NOTES
Discharge Facility[facility]  Discharge Diagnosis:Vertigo  Admission Date:6/4/25  Discharge Date: 6/5/24    PCP Appointment Date:none made sent to pcp office her daughter d=stated that she will call to schedule   Specialist Appointment Date: ED to Hosp-Admission (Discharged) with Jesus Monaco MD; Clinton Niño DO (06/04/2025)   Hospital Encounter and Summary Linked: Yes/  See discharge assessment below for further details  Wrap Up  Wrap Up Additional Comments: discused discharge spoke to  her daughter she stated that she is doing ok she had to take antivert yesterday per her daughter. provided contact information encouraged call with questions. (6/6/2025  9:05 AM)    Engagement  Call Start Time: 0905 (6/6/2025  9:05 AM)    Medications  Medications reviewed with patient/caregiver?: Yes (antivert  12.5 spoke to her daughter) (6/6/2025  9:05 AM)  Is the patient having any side effects they believe may be caused by any medication additions or changes?: No (6/6/2025  9:05 AM)  Does the patient have all medications ordered at discharge?: Yes (6/6/2025  9:05 AM)  Is the patient taking all medications as directed (includes completed medication regime)?: Yes (6/6/2025  9:05 AM)    Appointments  Does the patient have a primary care provider?: Yes (6/6/2025  9:05 AM)  Care Management Interventions: Advised patient to make appointment (6/6/2025  9:05 AM)  Has the patient kept scheduled appointments due by today?: Yes (6/6/2025  9:05 AM)    Self Management  Has home health visited the patient within 72 hours of discharge?: Yes (6/6/2025  9:05 AM)  Has all Durable Medical Equipment (DME) been delivered?: No (6/6/2025  9:05 AM)    Patient Teaching  Does the patient have access to their discharge instructions?: Yes (spoke to her daughter) (6/6/2025  9:05 AM)  Care Management Interventions: Reviewed instructions with patient (6/6/2025  9:05 AM)  What is the patient's perception of their health status since discharge?: Improving  (6/6/2025  9:05 AM)  Is the patient/caregiver able to teach back the hierarchy of who to call/visit for symptoms/problems? PCP, Specialist, Home Health nurse, Urgent Care, ED, 911: Yes (spoke to her daughter) (6/6/2025  9:05 AM)

## 2025-06-08 ENCOUNTER — PATIENT MESSAGE (OUTPATIENT)
Dept: PRIMARY CARE | Facility: CLINIC | Age: OVER 89
End: 2025-06-08
Payer: MEDICARE

## 2025-06-08 DIAGNOSIS — R42 DIZZINESS: ICD-10-CM

## 2025-06-09 DIAGNOSIS — R42 DIZZINESS: Primary | ICD-10-CM

## 2025-06-09 RX ORDER — MECLIZINE HCL 12.5 MG 12.5 MG/1
12.5 TABLET ORAL 2 TIMES DAILY PRN
Qty: 30 TABLET | Refills: 0 | Status: SHIPPED | OUTPATIENT
Start: 2025-06-09

## 2025-06-12 DIAGNOSIS — R42 DIZZINESS: ICD-10-CM

## 2025-06-14 LAB
ATRIAL RATE: 59 BPM
P AXIS: 84 DEGREES
P OFFSET: 162 MS
P ONSET: 100 MS
PR INTERVAL: 222 MS
Q ONSET: 211 MS
QRS COUNT: 10 BEATS
QRS DURATION: 102 MS
QT INTERVAL: 450 MS
QTC CALCULATION(BAZETT): 445 MS
QTC FREDERICIA: 447 MS
R AXIS: -70 DEGREES
T AXIS: 78 DEGREES
T OFFSET: 436 MS
VENTRICULAR RATE: 59 BPM

## 2025-06-17 ENCOUNTER — PATIENT OUTREACH (OUTPATIENT)
Dept: PRIMARY CARE | Facility: CLINIC | Age: OVER 89
End: 2025-06-17
Payer: MEDICARE

## 2025-06-17 NOTE — PROGRESS NOTES
Confirmation of at least 2 patient identifiers.    Completed telephonic follow-up with patient approximately 14 days post discharge, no PCP follow up.   Spoke to  her daughter during outreach call. She has an future appointment with her pcp    Patient reports feeling: Improved    Patient has questions or concerns about medications: No    Have all prescribed medications been filled? Yes    Patient has necessary resources to manage their care? Yes    Patient has questions or concerns? No    Next care management follow-up approximately within one month.  Care  information provided to patient.

## 2025-06-18 ENCOUNTER — APPOINTMENT (OUTPATIENT)
Dept: RADIOLOGY | Facility: HOSPITAL | Age: OVER 89
End: 2025-06-18
Payer: MEDICARE

## 2025-06-18 ENCOUNTER — APPOINTMENT (OUTPATIENT)
Dept: CARDIOLOGY | Facility: HOSPITAL | Age: OVER 89
End: 2025-06-18
Payer: MEDICARE

## 2025-06-18 ENCOUNTER — HOSPITAL ENCOUNTER (OUTPATIENT)
Facility: HOSPITAL | Age: OVER 89
Setting detail: OBSERVATION
Discharge: HOME | End: 2025-06-19
Attending: GENERAL PRACTICE | Admitting: INTERNAL MEDICINE
Payer: MEDICARE

## 2025-06-18 DIAGNOSIS — R55 SYNCOPE AND COLLAPSE: Primary | ICD-10-CM

## 2025-06-18 LAB
ALBUMIN SERPL BCP-MCNC: 3.8 G/DL (ref 3.4–5)
ALP SERPL-CCNC: 72 U/L (ref 33–136)
ALT SERPL W P-5'-P-CCNC: 8 U/L (ref 7–45)
ANION GAP SERPL CALC-SCNC: 14 MMOL/L (ref 10–20)
AORTIC VALVE MEAN GRADIENT: 3 MMHG
AORTIC VALVE PEAK VELOCITY: 1.25 M/S
AST SERPL W P-5'-P-CCNC: 19 U/L (ref 9–39)
ATRIAL RATE: 65 BPM
AV PEAK GRADIENT: 6 MMHG
BASOPHILS # BLD AUTO: 0.04 X10*3/UL (ref 0–0.1)
BASOPHILS NFR BLD AUTO: 0.5 %
BILIRUB SERPL-MCNC: 0.5 MG/DL (ref 0–1.2)
BUN SERPL-MCNC: 21 MG/DL (ref 6–23)
CALCIUM SERPL-MCNC: 9.2 MG/DL (ref 8.6–10.3)
CARDIAC TROPONIN I PNL SERPL HS: 25 NG/L (ref 0–13)
CARDIAC TROPONIN I PNL SERPL HS: 30 NG/L (ref 0–13)
CHLORIDE SERPL-SCNC: 104 MMOL/L (ref 98–107)
CK SERPL-CCNC: 27 U/L (ref 0–215)
CO2 SERPL-SCNC: 21 MMOL/L (ref 21–32)
CREAT SERPL-MCNC: 0.93 MG/DL (ref 0.5–1.05)
CRP SERPL-MCNC: 0.84 MG/DL
EGFRCR SERPLBLD CKD-EPI 2021: 57 ML/MIN/1.73M*2
EJECTION FRACTION APICAL 4 CHAMBER: 82.6
EOSINOPHIL # BLD AUTO: 0.39 X10*3/UL (ref 0–0.4)
EOSINOPHIL NFR BLD AUTO: 4.5 %
ERYTHROCYTE [DISTWIDTH] IN BLOOD BY AUTOMATED COUNT: 12.8 % (ref 11.5–14.5)
FLUAV RNA RESP QL NAA+PROBE: NOT DETECTED
FLUBV RNA RESP QL NAA+PROBE: NOT DETECTED
GLUCOSE SERPL-MCNC: 124 MG/DL (ref 74–99)
HCT VFR BLD AUTO: 41.8 % (ref 36–46)
HGB BLD-MCNC: 13.6 G/DL (ref 12–16)
IMM GRANULOCYTES # BLD AUTO: 0.02 X10*3/UL (ref 0–0.5)
IMM GRANULOCYTES NFR BLD AUTO: 0.2 % (ref 0–0.9)
LEFT VENTRICLE INTERNAL DIMENSION DIASTOLE: 3.11 CM (ref 3.5–6)
LYMPHOCYTES # BLD AUTO: 3.23 X10*3/UL (ref 0.8–3)
LYMPHOCYTES NFR BLD AUTO: 37.5 %
MAGNESIUM SERPL-MCNC: 1.89 MG/DL (ref 1.6–2.4)
MCH RBC QN AUTO: 31.8 PG (ref 26–34)
MCHC RBC AUTO-ENTMCNC: 32.5 G/DL (ref 32–36)
MCV RBC AUTO: 98 FL (ref 80–100)
MITRAL VALVE E/A RATIO: 1.17
MONOCYTES # BLD AUTO: 0.72 X10*3/UL (ref 0.05–0.8)
MONOCYTES NFR BLD AUTO: 8.4 %
NEUTROPHILS # BLD AUTO: 4.22 X10*3/UL (ref 1.6–5.5)
NEUTROPHILS NFR BLD AUTO: 48.9 %
NRBC BLD-RTO: 0 /100 WBCS (ref 0–0)
P AXIS: 14 DEGREES
P OFFSET: 164 MS
P ONSET: 106 MS
PLATELET # BLD AUTO: 272 X10*3/UL (ref 150–450)
POTASSIUM SERPL-SCNC: 4.2 MMOL/L (ref 3.5–5.3)
PR INTERVAL: 216 MS
PROT SERPL-MCNC: 7.3 G/DL (ref 6.4–8.2)
Q ONSET: 214 MS
QRS COUNT: 11 BEATS
QRS DURATION: 102 MS
QT INTERVAL: 450 MS
QTC CALCULATION(BAZETT): 468 MS
QTC FREDERICIA: 462 MS
R AXIS: -11 DEGREES
RBC # BLD AUTO: 4.28 X10*6/UL (ref 4–5.2)
RIGHT VENTRICLE FREE WALL PEAK S': 11 CM/S
RIGHT VENTRICLE PEAK SYSTOLIC PRESSURE: 19 MMHG
SARS-COV-2 RNA RESP QL NAA+PROBE: NOT DETECTED
SODIUM SERPL-SCNC: 135 MMOL/L (ref 136–145)
T AXIS: 9 DEGREES
T OFFSET: 439 MS
TSH SERPL-ACNC: 3.25 MIU/L (ref 0.44–3.98)
VENTRICULAR RATE: 65 BPM
WBC # BLD AUTO: 8.6 X10*3/UL (ref 4.4–11.3)

## 2025-06-18 PROCEDURE — G0378 HOSPITAL OBSERVATION PER HR: HCPCS

## 2025-06-18 PROCEDURE — 2500000005 HC RX 250 GENERAL PHARMACY W/O HCPCS: Performed by: INTERNAL MEDICINE

## 2025-06-18 PROCEDURE — 82550 ASSAY OF CK (CPK): CPT | Performed by: INTERNAL MEDICINE

## 2025-06-18 PROCEDURE — 84443 ASSAY THYROID STIM HORMONE: CPT | Performed by: INTERNAL MEDICINE

## 2025-06-18 PROCEDURE — 85025 COMPLETE CBC W/AUTO DIFF WBC: CPT | Performed by: GENERAL PRACTICE

## 2025-06-18 PROCEDURE — 99285 EMERGENCY DEPT VISIT HI MDM: CPT | Mod: 25 | Performed by: GENERAL PRACTICE

## 2025-06-18 PROCEDURE — 93005 ELECTROCARDIOGRAM TRACING: CPT

## 2025-06-18 PROCEDURE — 71045 X-RAY EXAM CHEST 1 VIEW: CPT | Performed by: RADIOLOGY

## 2025-06-18 PROCEDURE — 86140 C-REACTIVE PROTEIN: CPT | Performed by: INTERNAL MEDICINE

## 2025-06-18 PROCEDURE — 80053 COMPREHEN METABOLIC PANEL: CPT | Performed by: GENERAL PRACTICE

## 2025-06-18 PROCEDURE — 99222 1ST HOSP IP/OBS MODERATE 55: CPT

## 2025-06-18 PROCEDURE — 36415 COLL VENOUS BLD VENIPUNCTURE: CPT | Performed by: GENERAL PRACTICE

## 2025-06-18 PROCEDURE — 70450 CT HEAD/BRAIN W/O DYE: CPT | Performed by: RADIOLOGY

## 2025-06-18 PROCEDURE — 71045 X-RAY EXAM CHEST 1 VIEW: CPT

## 2025-06-18 PROCEDURE — 2500000004 HC RX 250 GENERAL PHARMACY W/ HCPCS (ALT 636 FOR OP/ED): Mod: JW | Performed by: INTERNAL MEDICINE

## 2025-06-18 PROCEDURE — 93308 TTE F-UP OR LMTD: CPT | Performed by: INTERNAL MEDICINE

## 2025-06-18 PROCEDURE — 93325 DOPPLER ECHO COLOR FLOW MAPG: CPT | Performed by: INTERNAL MEDICINE

## 2025-06-18 PROCEDURE — 72125 CT NECK SPINE W/O DYE: CPT | Performed by: RADIOLOGY

## 2025-06-18 PROCEDURE — 93321 DOPPLER ECHO F-UP/LMTD STD: CPT | Performed by: INTERNAL MEDICINE

## 2025-06-18 PROCEDURE — 72125 CT NECK SPINE W/O DYE: CPT

## 2025-06-18 PROCEDURE — 84484 ASSAY OF TROPONIN QUANT: CPT | Performed by: GENERAL PRACTICE

## 2025-06-18 PROCEDURE — 2500000001 HC RX 250 WO HCPCS SELF ADMINISTERED DRUGS (ALT 637 FOR MEDICARE OP): Performed by: INTERNAL MEDICINE

## 2025-06-18 PROCEDURE — 87636 SARSCOV2 & INF A&B AMP PRB: CPT | Performed by: INTERNAL MEDICINE

## 2025-06-18 PROCEDURE — 83735 ASSAY OF MAGNESIUM: CPT | Performed by: GENERAL PRACTICE

## 2025-06-18 PROCEDURE — 93325 DOPPLER ECHO COLOR FLOW MAPG: CPT

## 2025-06-18 PROCEDURE — 70450 CT HEAD/BRAIN W/O DYE: CPT

## 2025-06-18 RX ORDER — ENOXAPARIN SODIUM 100 MG/ML
30 INJECTION SUBCUTANEOUS EVERY 24 HOURS
Status: DISCONTINUED | OUTPATIENT
Start: 2025-06-19 | End: 2025-06-19 | Stop reason: HOSPADM

## 2025-06-18 RX ORDER — ACETAMINOPHEN 160 MG/5ML
650 SOLUTION ORAL EVERY 6 HOURS PRN
Status: DISCONTINUED | OUTPATIENT
Start: 2025-06-18 | End: 2025-06-19 | Stop reason: HOSPADM

## 2025-06-18 RX ORDER — CYANOCOBALAMIN (VITAMIN B-12) 500 MCG
10 TABLET ORAL DAILY
Status: DISCONTINUED | OUTPATIENT
Start: 2025-06-18 | End: 2025-06-19 | Stop reason: HOSPADM

## 2025-06-18 RX ORDER — ONDANSETRON 4 MG/1
4 TABLET, FILM COATED ORAL EVERY 8 HOURS PRN
Status: DISCONTINUED | OUTPATIENT
Start: 2025-06-18 | End: 2025-06-19 | Stop reason: HOSPADM

## 2025-06-18 RX ORDER — ONDANSETRON HYDROCHLORIDE 2 MG/ML
4 INJECTION, SOLUTION INTRAVENOUS EVERY 8 HOURS PRN
Status: DISCONTINUED | OUTPATIENT
Start: 2025-06-18 | End: 2025-06-19 | Stop reason: HOSPADM

## 2025-06-18 RX ORDER — ACETAMINOPHEN 650 MG/1
650 SUPPOSITORY RECTAL EVERY 4 HOURS PRN
Status: DISCONTINUED | OUTPATIENT
Start: 2025-06-18 | End: 2025-06-19 | Stop reason: HOSPADM

## 2025-06-18 RX ORDER — ACETAMINOPHEN 325 MG/1
975 TABLET ORAL EVERY 8 HOURS PRN
Status: DISCONTINUED | OUTPATIENT
Start: 2025-06-18 | End: 2025-06-19 | Stop reason: HOSPADM

## 2025-06-18 RX ORDER — POLYETHYLENE GLYCOL 3350 17 G/17G
17 POWDER, FOR SOLUTION ORAL DAILY
Status: DISCONTINUED | OUTPATIENT
Start: 2025-06-18 | End: 2025-06-19 | Stop reason: HOSPADM

## 2025-06-18 RX ORDER — TALC
6 POWDER (GRAM) TOPICAL NIGHTLY
Status: DISCONTINUED | OUTPATIENT
Start: 2025-06-18 | End: 2025-06-19 | Stop reason: HOSPADM

## 2025-06-18 RX ADMIN — CHOLECALCIFEROL (VITAMIN D3) 10 MCG (400 UNIT) TABLET 10 MCG: at 14:32

## 2025-06-18 RX ADMIN — POLYETHYLENE GLYCOL 3350 17 G: 17 POWDER, FOR SOLUTION ORAL at 13:02

## 2025-06-18 RX ADMIN — HUMAN ALBUMIN MICROSPHERES AND PERFLUTREN 0.5 ML: 10; .22 INJECTION, SOLUTION INTRAVENOUS at 14:16

## 2025-06-18 SDOH — ECONOMIC STABILITY: INCOME INSECURITY: IN THE PAST 12 MONTHS HAS THE ELECTRIC, GAS, OIL, OR WATER COMPANY THREATENED TO SHUT OFF SERVICES IN YOUR HOME?: NO

## 2025-06-18 SDOH — SOCIAL STABILITY: SOCIAL INSECURITY: WITHIN THE LAST YEAR, HAVE YOU BEEN HUMILIATED OR EMOTIONALLY ABUSED IN OTHER WAYS BY YOUR PARTNER OR EX-PARTNER?: NO

## 2025-06-18 SDOH — ECONOMIC STABILITY: FOOD INSECURITY: WITHIN THE PAST 12 MONTHS, THE FOOD YOU BOUGHT JUST DIDN'T LAST AND YOU DIDN'T HAVE MONEY TO GET MORE.: NEVER TRUE

## 2025-06-18 SDOH — ECONOMIC STABILITY: FOOD INSECURITY: WITHIN THE PAST 12 MONTHS, YOU WORRIED THAT YOUR FOOD WOULD RUN OUT BEFORE YOU GOT THE MONEY TO BUY MORE.: NEVER TRUE

## 2025-06-18 SDOH — SOCIAL STABILITY: SOCIAL INSECURITY: WERE YOU ABLE TO COMPLETE ALL THE BEHAVIORAL HEALTH SCREENINGS?: YES

## 2025-06-18 SDOH — SOCIAL STABILITY: SOCIAL INSECURITY: DOES ANYONE TRY TO KEEP YOU FROM HAVING/CONTACTING OTHER FRIENDS OR DOING THINGS OUTSIDE YOUR HOME?: NO

## 2025-06-18 SDOH — SOCIAL STABILITY: SOCIAL INSECURITY: ARE YOU OR HAVE YOU BEEN THREATENED OR ABUSED PHYSICALLY, EMOTIONALLY, OR SEXUALLY BY ANYONE?: NO

## 2025-06-18 SDOH — SOCIAL STABILITY: SOCIAL INSECURITY: WITHIN THE LAST YEAR, HAVE YOU BEEN AFRAID OF YOUR PARTNER OR EX-PARTNER?: NO

## 2025-06-18 SDOH — SOCIAL STABILITY: SOCIAL INSECURITY: HAVE YOU HAD ANY THOUGHTS OF HARMING ANYONE ELSE?: NO

## 2025-06-18 SDOH — SOCIAL STABILITY: SOCIAL INSECURITY: HAS ANYONE EVER THREATENED TO HURT YOUR FAMILY OR YOUR PETS?: NO

## 2025-06-18 SDOH — SOCIAL STABILITY: SOCIAL INSECURITY: HAVE YOU HAD THOUGHTS OF HARMING ANYONE ELSE?: NO

## 2025-06-18 SDOH — SOCIAL STABILITY: SOCIAL INSECURITY: DO YOU FEEL ANYONE HAS EXPLOITED OR TAKEN ADVANTAGE OF YOU FINANCIALLY OR OF YOUR PERSONAL PROPERTY?: NO

## 2025-06-18 SDOH — SOCIAL STABILITY: SOCIAL INSECURITY: ARE THERE ANY APPARENT SIGNS OF INJURIES/BEHAVIORS THAT COULD BE RELATED TO ABUSE/NEGLECT?: NO

## 2025-06-18 SDOH — SOCIAL STABILITY: SOCIAL INSECURITY: DO YOU FEEL UNSAFE GOING BACK TO THE PLACE WHERE YOU ARE LIVING?: NO

## 2025-06-18 SDOH — SOCIAL STABILITY: SOCIAL INSECURITY: ABUSE: ADULT

## 2025-06-18 ASSESSMENT — ENCOUNTER SYMPTOMS
COUGH: 0
CHEST TIGHTNESS: 0
DIZZINESS: 0
DIARRHEA: 0
SORE THROAT: 0
CHILLS: 0
PALPITATIONS: 0
RHINORRHEA: 0
LIGHT-HEADEDNESS: 0
WHEEZING: 0
WEAKNESS: 0
CONSTIPATION: 0
DYSURIA: 0
SHORTNESS OF BREATH: 0
FEVER: 0
ABDOMINAL PAIN: 0
FATIGUE: 0
DIFFICULTY URINATING: 0
VOMITING: 0
NAUSEA: 0

## 2025-06-18 ASSESSMENT — ACTIVITIES OF DAILY LIVING (ADL)
DRESSING YOURSELF: INDEPENDENT
ASSISTIVE_DEVICE: EYEGLASSES
HEARING - RIGHT EAR: FUNCTIONAL
LACK_OF_TRANSPORTATION: NO
WALKS IN HOME: NEEDS ASSISTANCE
ADEQUATE_TO_COMPLETE_ADL: YES
GROOMING: INDEPENDENT
BATHING: INDEPENDENT
TOILETING: NEEDS ASSISTANCE
FEEDING YOURSELF: INDEPENDENT
HEARING - LEFT EAR: FUNCTIONAL
JUDGMENT_ADEQUATE_SAFELY_COMPLETE_DAILY_ACTIVITIES: YES
PATIENT'S MEMORY ADEQUATE TO SAFELY COMPLETE DAILY ACTIVITIES?: YES

## 2025-06-18 ASSESSMENT — COGNITIVE AND FUNCTIONAL STATUS - GENERAL
MOVING TO AND FROM BED TO CHAIR: A LITTLE
TOILETING: A LITTLE
DRESSING REGULAR LOWER BODY CLOTHING: A LITTLE
PATIENT BASELINE BEDBOUND: NO
HELP NEEDED FOR BATHING: A LITTLE
CLIMB 3 TO 5 STEPS WITH RAILING: A LOT
MOBILITY SCORE: 19
TOILETING: A LITTLE
MOBILITY SCORE: 19
STANDING UP FROM CHAIR USING ARMS: A LITTLE
STANDING UP FROM CHAIR USING ARMS: A LITTLE
HELP NEEDED FOR BATHING: A LITTLE
DAILY ACTIVITIY SCORE: 21
DAILY ACTIVITIY SCORE: 18
CLIMB 3 TO 5 STEPS WITH RAILING: A LOT
DRESSING REGULAR LOWER BODY CLOTHING: A LITTLE
WALKING IN HOSPITAL ROOM: A LITTLE
EATING MEALS: A LITTLE
DRESSING REGULAR UPPER BODY CLOTHING: A LITTLE
PERSONAL GROOMING: A LITTLE
WALKING IN HOSPITAL ROOM: A LITTLE
MOVING TO AND FROM BED TO CHAIR: A LITTLE

## 2025-06-18 ASSESSMENT — LIFESTYLE VARIABLES
SKIP TO QUESTIONS 9-10: 1
HOW OFTEN DO YOU HAVE 6 OR MORE DRINKS ON ONE OCCASION: NEVER
AUDIT-C TOTAL SCORE: 0
HOW OFTEN DO YOU HAVE A DRINK CONTAINING ALCOHOL: NEVER
HOW MANY STANDARD DRINKS CONTAINING ALCOHOL DO YOU HAVE ON A TYPICAL DAY: PATIENT DOES NOT DRINK
AUDIT-C TOTAL SCORE: 0

## 2025-06-18 ASSESSMENT — PAIN - FUNCTIONAL ASSESSMENT
PAIN_FUNCTIONAL_ASSESSMENT: 0-10
PAIN_FUNCTIONAL_ASSESSMENT: 0-10

## 2025-06-18 ASSESSMENT — PAIN SCALES - GENERAL
PAINLEVEL_OUTOF10: 0 - NO PAIN

## 2025-06-18 NOTE — H&P
History Of Present Illness  Grace Ahn is a 89 y.o. female with PMHx of dementia, SILVA, HTN, syncope, vertigo, osteopenia who presented to [facility] ED via EMS after being found on the floor of her bathroom at her independent living. Per ED handoff pt was somnolent when she arrived and unsure how she ended up on the floor. However, pt stated that she was on the toilet and was not feeling well and was able to lower herself to the ground and press her call button for help. She denies syncope or loss of consciousness. She feels she is currently at her baseline. She denies CP, SOB, recent fever, illness, N/V/C/D, lightheadedness, dizziness, weakness.   ED course: Vitals significant for elevated BP   Labs largely unremarkable   HST 30, 25   CRP 0.84, CK 27, TSH 3.25   Flu/COVID negative   CXR - Hyperinflation. Vague increased opacity of the left base may represent atelectasis and/or small infiltrate   CT cerv spine - No evidence of an acute fx. Degenerative changes   CTH - No evidence of an acute intracranial process  Meds given: None      Past Medical History  She has a past medical history of Acute cystitis without hematuria (02/10/2020), Adverse effect of unspecified drugs, medicaments and biological substances, initial encounter (09/09/2021), Allergic (1945), Delusional disorders (11/02/2021), Laceration without foreign body, left lower leg, initial encounter (03/15/2020), Other fracture of left patella, initial encounter for closed fracture (02/16/2020), Personal history of other specified conditions (02/24/2020), Personal history of urinary (tract) infections (12/08/2021), and Unspecified fracture of left patella, initial encounter for closed fracture (02/06/2020).    Surgical History  She has a past surgical history that includes Other surgical history (12/10/2019); Other surgical history (12/10/2019); Other surgical history (12/10/2019); Other surgical history (12/10/2019); Other surgical history (12/10/2019);  Other surgical history (11/02/2021); Other surgical history (02/23/2022); Other surgical history (02/23/2022); Other surgical history (02/23/2022); Other surgical history (02/23/2022); MR angio head wo IV contrast (07/25/2022); MR angio neck wo IV contrast (07/25/2022); Appendectomy (1935); Eye surgery (2020); and Hysterectomy (1977).     Social History  She reports that she has never smoked. She has never used smokeless tobacco. She reports that she does not currently use alcohol. She reports that she does not use drugs.    Family History  Family History[1]     Allergies  Bacitracin-polymyxin b, Latex, Penicillins, Adhesive tape-silicones, Bacitracin, Cephalosporins, Chlorine, Ciprofloxacin, Doxycycline, Neomycin, Omeprazole, Pantoprazole, and Sulfamethoxazole    Review of Systems   Constitutional:  Negative for chills, fatigue and fever.   HENT:  Negative for congestion, rhinorrhea and sore throat.    Respiratory:  Negative for cough, chest tightness, shortness of breath and wheezing.    Cardiovascular:  Negative for chest pain, palpitations and leg swelling.   Gastrointestinal:  Negative for abdominal pain, constipation, diarrhea, nausea and vomiting.   Genitourinary:  Negative for difficulty urinating and dysuria.   Neurological:  Negative for dizziness, syncope, weakness and light-headedness.        Physical Exam  Constitutional:       General: She is not in acute distress.     Appearance: Normal appearance. She is not ill-appearing or toxic-appearing.   HENT:      Head: Normocephalic and atraumatic.   Cardiovascular:      Rate and Rhythm: Normal rate and regular rhythm.      Pulses: Normal pulses.      Heart sounds: Normal heart sounds. No murmur heard.     No friction rub. No gallop.   Pulmonary:      Effort: Pulmonary effort is normal.      Breath sounds: Normal breath sounds. No wheezing, rhonchi or rales.   Abdominal:      General: There is no distension.      Palpations: Abdomen is soft. There is no mass.       Tenderness: There is no abdominal tenderness.   Musculoskeletal:      Right lower leg: No edema.      Left lower leg: No edema.   Skin:     General: Skin is warm and dry.   Neurological:      General: No focal deficit present.      Mental Status: She is alert and oriented to person, place, and time.   Psychiatric:         Mood and Affect: Mood normal.         Behavior: Behavior normal.          Last Recorded Vitals  BP (!) 155/99   Pulse 58   Temp 36.2 °C (97.1 °F)   Resp 20   Wt 54.9 kg (121 lb)   SpO2 96%     Relevant Results  Results for orders placed or performed during the hospital encounter of 06/18/25 (from the past 24 hours)   ECG 12 lead   Result Value Ref Range    Ventricular Rate 65 BPM    Atrial Rate 65 BPM    SC Interval 216 ms    QRS Duration 102 ms    QT Interval 450 ms    QTC Calculation(Bazett) 468 ms    P Axis 14 degrees    R Axis -11 degrees    T Axis 9 degrees    QRS Count 11 beats    Q Onset 214 ms    P Onset 106 ms    P Offset 164 ms    T Offset 439 ms    QTC Fredericia 462 ms   CBC and Auto Differential   Result Value Ref Range    WBC 8.6 4.4 - 11.3 x10*3/uL    nRBC 0.0 0.0 - 0.0 /100 WBCs    RBC 4.28 4.00 - 5.20 x10*6/uL    Hemoglobin 13.6 12.0 - 16.0 g/dL    Hematocrit 41.8 36.0 - 46.0 %    MCV 98 80 - 100 fL    MCH 31.8 26.0 - 34.0 pg    MCHC 32.5 32.0 - 36.0 g/dL    RDW 12.8 11.5 - 14.5 %    Platelets 272 150 - 450 x10*3/uL    Neutrophils % 48.9 40.0 - 80.0 %    Immature Granulocytes %, Automated 0.2 0.0 - 0.9 %    Lymphocytes % 37.5 13.0 - 44.0 %    Monocytes % 8.4 2.0 - 10.0 %    Eosinophils % 4.5 0.0 - 6.0 %    Basophils % 0.5 0.0 - 2.0 %    Neutrophils Absolute 4.22 1.60 - 5.50 x10*3/uL    Immature Granulocytes Absolute, Automated 0.02 0.00 - 0.50 x10*3/uL    Lymphocytes Absolute 3.23 (H) 0.80 - 3.00 x10*3/uL    Monocytes Absolute 0.72 0.05 - 0.80 x10*3/uL    Eosinophils Absolute 0.39 0.00 - 0.40 x10*3/uL    Basophils Absolute 0.04 0.00 - 0.10 x10*3/uL   Magnesium   Result  Value Ref Range    Magnesium 1.89 1.60 - 2.40 mg/dL   Comprehensive metabolic panel   Result Value Ref Range    Glucose 124 (H) 74 - 99 mg/dL    Sodium 135 (L) 136 - 145 mmol/L    Potassium 4.2 3.5 - 5.3 mmol/L    Chloride 104 98 - 107 mmol/L    Bicarbonate 21 21 - 32 mmol/L    Anion Gap 14 10 - 20 mmol/L    Urea Nitrogen 21 6 - 23 mg/dL    Creatinine 0.93 0.50 - 1.05 mg/dL    eGFR 57 (L) >60 mL/min/1.73m*2    Calcium 9.2 8.6 - 10.3 mg/dL    Albumin 3.8 3.4 - 5.0 g/dL    Alkaline Phosphatase 72 33 - 136 U/L    Total Protein 7.3 6.4 - 8.2 g/dL    AST 19 9 - 39 U/L    Bilirubin, Total 0.5 0.0 - 1.2 mg/dL    ALT 8 7 - 45 U/L   Troponin I, High Sensitivity   Result Value Ref Range    Troponin I, High Sensitivity 30 (H) 0 - 13 ng/L   Troponin I, High Sensitivity   Result Value Ref Range    Troponin I, High Sensitivity 25 (H) 0 - 13 ng/L   Creatine Kinase   Result Value Ref Range    Creatine Kinase 27 0 - 215 U/L   TSH with reflex to Free T4 if abnormal   Result Value Ref Range    Thyroid Stimulating Hormone 3.25 0.44 - 3.98 mIU/L   C-reactive protein   Result Value Ref Range    C-Reactive Protein 0.84 <1.00 mg/dL   Sars-CoV-2 and Influenza A/B PCR   Result Value Ref Range    Flu A Result Not Detected Not Detected    Flu B Result Not Detected Not Detected    Coronavirus 2019, PCR Not Detected Not Detected     *Note: Due to a large number of results and/or encounters for the requested time period, some results have not been displayed. A complete set of results can be found in Results Review.     ECG 12 lead  Result Date: 6/18/2025  Sinus rhythm with 1st degree AV block with Blocked Premature atrial complexes Minimal voltage criteria for LVH, may be normal variant ( Artemio product ) Inferior infarct (cited on or before 08-FEB-2024) Anteroseptal infarct (cited on or before 05-FEB-2024) Abnormal ECG When compared with ECG of 04-JUN-2025 12:48, QRS axis Shifted right Questionable change in initial forces of Inferior leads  Nonspecific T wave abnormality now evident in Inferior leads    CT cervical spine wo IV contrast  Result Date: 6/18/2025  Interpreted By:  Kamari Colindres, STUDY: CT CERVICAL SPINE WO IV CONTRAST;  6/18/2025 8:38 am   INDICATION: Signs/Symptoms:Syncope and collapse, found on ground.     COMPARISON: None.   ACCESSION NUMBER(S): [id number]   ORDERING CLINICIAN: DAVIDE ABEL   TECHNIQUE: Unenhanced axial images were obtained through the cervical spine. The axial data was utilized to reconstruct images in sagittal and coronal planes.   FINDINGS: No acute fracture is identified. Degenerative changes include disc space narrowing, endplate spurring, endplate sclerosis, uncovertebral spurring, posterior disc osteophyte complexes, and facet hypertrophy. There is subluxation at a few levels which is most likely related to facet degenerative disease. Alignment is not significantly changed when compared to the previous study. Facet joints demonstrate a normal alignment. Prevertebral soft tissues are within normal limits. No lytic or blastic lesion is noted.       No evidence of an acute fracture. Degenerative changes.   MACRO: None.   Signed by: Kamari Colindres 6/18/2025 8:54 AM Dictation workstation:   [id number]    CT head wo IV contrast  Result Date: 6/18/2025  Interpreted By:  Kamari Colindres, STUDY: CT HEAD WO IV CONTRAST;  6/18/2025 8:38 am   INDICATION: Signs/Symptoms:Syncope and collapse, found on ground.     COMPARISON: 06/04/2025   ACCESSION NUMBER(S): [id number]   ORDERING CLINICIAN: DAVIDE ABEL   TECHNIQUE: Unenhanced images were obtained through the brain.   FINDINGS: There is atrophy resulting in prominence of the ventricles and sulci. There are areas of decreased attenuation within the white matter which are nonspecific but are commonly associated with small vessel ischemic disease. There is no mass effect or midline shift. No acute intracranial hemorrhage is identified. No extra-axial fluid collections are seen. No  intraparenchymal mass lesions are identified.  Bone windows demonstrate no evidence of an acute calvarial fracture. Air-fluid level and mucosal thickening noted within the left maxillary sinus.       No evidence of an acute intracranial process.   MACRO: None.   Signed by: Kamari Colindres 6/18/2025 8:51 AM Dictation workstation:   [id number]    XR chest 1 view  Result Date: 6/18/2025  Interpreted By:  Angel Arevalo, STUDY: XR CHEST 1 VIEW;  6/18/2025 7:47 am   INDICATION: Signs/Symptoms:syncope and collapse.     COMPARISON: 02/05/2024.   ACCESSION NUMBER(S): [id number]   ORDERING CLINICIAN: DAVIDE ABEL   FINDINGS: CARDIOMEDIASTINAL SILHOUETTE: Dense aortic calcifications again seen. Cardiac silhouette is stable and not significantly enlarged.   LUNGS: Lungs are hyperinflated similar to prior. Mild interstitial prominence may be chronic. Vague increased opacity of the left lung base may represent atelectasis and/or small infiltrate. No pleural effusion is seen. No pneumothorax.   ABDOMEN: No remarkable upper abdominal findings.   BONES: Lower thoracic mild levocurvature is again seen.       1.  Hyperinflation. Vague increased opacity of the left base may represent atelectasis and/or small infiltrate.       MACRO: None.   Signed by: Angel Arevalo 6/18/2025 7:57 AM Dictation workstation:   [id number]    ECG 12 lead  Result Date: 6/14/2025  Sinus bradycardia with 1st degree AV block with Premature atrial complexes Left axis deviation Incomplete right bundle branch block Minimal voltage criteria for LVH, may be normal variant ( Stonewall product ) Inferior infarct (cited on or before 08-FEB-2024) Anteroseptal infarct (cited on or before 05-FEB-2024) Abnormal ECG When compared with ECG of 08-FEB-2024 01:27, Premature atrial complexes are now Present IA interval has increased Questionable change in initial forces of Anterior leads See ED provider note for full interpretation and clinical correlation Confirmed by  Liz Torre ([id number]) on 6/14/2025 4:29:20 PM    CT head wo IV contrast  Result Date: 6/4/2025  Interpreted By:  Patrizia Sarah, STUDY: CT HEAD WO IV CONTRAST;  6/4/2025 2:14 pm   INDICATION: Signs/Symptoms:dizziness with sittting up right.     COMPARISON: 02/05/2024   ACCESSION NUMBER(S): [id number]   ORDERING CLINICIAN: CHYNA GREGORY   TECHNIQUE: Noncontrast axial CT scan of head was performed. Angled reformats in brain and bone windows were generated. The images were reviewed in bone, brain, blood and soft tissue windows.   FINDINGS: CSF Spaces: The ventricles, sulci and basal cisterns are prominent, likely reflecting generalized parenchymal volume loss. There is no abnormal extraaxial fluid collection.   Parenchyma: Unchanged patchy and confluent periventricular and subcortical hypodensities, while nonspecific are compatible with chronic small vessel ischemic disease. The grey-white differentiation is preserved. There is no mass effect or midline shift.  There is no acute intracranial hemorrhage.   Calvarium: The calvarium is unremarkable.   Paranasal sinuses and mastoids: Increased layering opacification in the left maxillary sinus with the additional areas of cystic changes.       1. No acute intracranial abnormality is evident. 2. Increased layering opacification in the left maxillary sinus with the additional areas of cystic changes, please correlate for sinusitis. 3. Additional chronic and incidental findings as detailed above.     MACRO: None   Signed by: Patrizia Sarah 6/4/2025 2:43 PM Dictation workstation:   [id number]             Assessment/Plan   Assessment & Plan  Syncope and collapse  Grace Ahn is a 89 y.o. female with PMHx of dementia, SILVA, HTN, syncope, vertigo, osteopenia who [place] [facility] [place] after being found on the floor of her bathroom at her independent living. Per ED handoff pt was somnolent when she arrived and unsure how she ended up on the floor. However, pt  stated that she was on the toilet and was not feeling well and was able to lower herself to the ground and press her call button for help. She denies syncope or loss of consciousness. She feels she is currently at her baseline. She denies CP, SOB, recent fever, illness, N/V/C/D, lightheadedness, dizziness, weakness.   ED course: Vitals significant for elevated BP   Labs largely unremarkable   HST 30, 25   CRP 0.84, CK 27, TSH 3.25   Flu/COVID negative   CXR - Hyperinflation. Vague increased opacity of the left base may represent atelectasis and/or small infiltrate   CT cerv spine - No evidence of an acute fx. Degenerative changes   CTH - No evidence of an acute intracranial process  Meds given: None    ?Syncope   - Unclear if pt actually lost consciousness as she denies but per ED pt had altered mentation on arrival   - CT cervical spine and CTH - negative for acute process  - Echo ordered   - Orthostatic BP ordered   - PT/OT eval    HTN  - BP has been elevated this stay   - Discussed with son in law, Dr. Ozzy Tavera (cardiologist) who said please do not start on antihypertensives or give antihypertensives via IV as BP tends to drop dangerously low   - Continue to monitor     Other comorbidities as above  - Continue medications as ordered and adjust based on clinical course      VTE / GI prophylaxis   - Subcutaneous Lovenox, bowel regimen in place      Discharge planning  - Pending PT/OT evals, possible SNF     Discussed with Dr. Monaco and the interdisciplinary team            Yari Crook PA-C         [1]   Family History  Problem Relation Name Age of Onset    Cancer Father Davis Delarosa

## 2025-06-18 NOTE — PROGRESS NOTES
Pharmacy Medication History     Source of Information: PATIENT'S DAUGHTER    Additional concerns with the patient's PTA list.     Notified Provider via Haiku : YES    The following updates were made to the Prior to Admission medication list:     Medications ADDED:   N/A  Medications CHANGED:  N/A  Medications REMOVED:   N/A  Medications NOT TAKING:   MECLIZINE 12.5 MG    Allergy reviewed : Yes    Meds 2 Beds : Yes    Outpatient pharmacy confirmed and updated in chart : Yes    [facility]: [facility]    The list below reflectives the updated PTA list. Please review each medication in order reconciliation for additional clarification and justification.    Prior to Admission Medications   Prescriptions Last Dose      acetaminophen (Tylenol) 325 mg tablet Past Week      Sig: Take 2 tablets (650 mg) by mouth every 8 hours if needed for moderate pain (4 - 6).   albuterol (ProAir RespiClick) 90 mcg/actuation aerosol powdr breath activated inhaler Past Month      Sig: Inhale 2 puffs every 6 hours if needed for wheezing.   calcium carbonate-vitamin D3 600 mg-5 mcg (200 unit) tablet Past Week      Sig: Take 1 tablet by mouth once daily.   cholecalciferol (Vitamin D-3) 10 MCG (400 UNIT) tablet Past Week      Sig: Take 1 tablet (10 mcg) by mouth once daily.   denosumab (Prolia) 60 mg/mL syringe Past Month      Sig: Inject 1 mL (60 mg total) under the skin every 6 months.   dextran 70-hypromellose, PF, (Bion Tears) 0.1-0.3 % ophthalmic solution Past Week      Sig: Administer 1 drop into both eyes 2 times a day. And as needed   estradiol (Estrace) 0.01 % (0.1 mg/gram) vaginal cream Past Week      Sig: Insert a pea-sized amount into vagina three times per week at bedtime   Patient taking differently: Insert 0.125 Applicatorfuls (0.5 g) into the vagina 3 times a week. Insert a pea-sized amount into vagina three times per week at bedtime             mupirocin (Bactroban) 2 % ointment Past Week      Sig: Apply topically 3 times a  day. apply to affected areas of infection for 5 days   Patient taking differently: Apply 1 Application topically once daily as needed (wound care). apply to affected areas of infection for 5 days   mv,Ca,min-folic acid-vit K1 (One-A-Day Women's 50 Plus) 400-20 mcg tablet Past Week      Sig: Take 1 tablet by mouth once daily.   triamcinolone (Kenalog) 0.1 % cream Past Week      Sig: Apply topically 2 times a day as needed for rash. Apply to affected area 1-2 times daily as needed.   Patient taking differently: Apply 1 Application topically 2 times a day as needed for rash. Apply to affected area 1-2 times daily as needed.      Facility-Administered Medications: None       The list below reflectives the updated allergy list. Please review each documented allergy for additional clarification and justification.    Allergies   Allergen Reactions    Bacitracin-Polymyxin B Unknown    Latex Other    Penicillins Hives    Adhesive Tape-Silicones Rash    Bacitracin Rash    Cephalosporins Hives and Rash    Chlorine Rash    Ciprofloxacin Rash and Unknown    Doxycycline Rash    Neomycin Rash    Omeprazole Rash    Pantoprazole Rash    Sulfamethoxazole Rash          06/18/25 at 1:33 PM - Marybeth Carrero

## 2025-06-18 NOTE — ASSESSMENT & PLAN NOTE
Grace Ahn is a 89 y.o. female with PMHx of dementia, SILVA, HTN, syncope, vertigo, osteopenia who [place] to [facility] ED via EMS after being found on the floor of her bathroom at her independent living. Per ED handoff pt was somnolent when she arrived and unsure how she ended up on the floor. However, pt stated that she was on the toilet and was not feeling well and was able to lower herself to the ground and press her call button for help. She denies syncope or loss of consciousness. She feels she is currently at her baseline. She denies CP, SOB, recent fever, illness, N/V/C/D, lightheadedness, dizziness, weakness.   ED course: Vitals significant for elevated BP   Labs largely unremarkable   HST 30, 25   CRP 0.84, CK 27, TSH 3.25   Flu/COVID negative   CXR - Hyperinflation. Vague increased opacity of the left base may represent atelectasis and/or small infiltrate   CT cerv spine - No evidence of an acute fx. Degenerative changes   CTH - No evidence of an acute intracranial process  Meds given: None    ?Syncope   - Unclear if pt actually lost consciousness as she denies but per ED pt had altered mentation on arrival   - CT cervical spine and CTH - negative for acute process  - Echo ordered   - Orthostatic BP ordered   - PT/OT eval    HTN  - BP has been elevated this stay   - Discussed with son in law, Dr. Ozzy Tavera (cardiologist) who said please do not start on antihypertensives or give antihypertensives via IV as BP tends to drop dangerously low   - Continue to monitor     Other comorbidities as above  - Continue medications as ordered and adjust based on clinical course      VTE / GI prophylaxis   - Subcutaneous Lovenox, bowel regimen in place      Discharge planning  - Pending PT/OT evals, possible SNF     Discussed with Dr. Monaco and the interdisciplinary team

## 2025-06-18 NOTE — ED TRIAGE NOTES
Pt arrives via EMS from [facility]. Pt was found on bathroom floor - had pressed her medical alert bracelet. A&Ox2, unsure how she fell, what she hit when she fell or how long she was on the ground. No visible injuries upon arrival.

## 2025-06-18 NOTE — CARE PLAN
The patient's goals for the shift include      The clinical goals for the shift include pt will remain free from falls and injury      Problem: Pain - Adult  Goal: Verbalizes/displays adequate comfort level or baseline comfort level  Outcome: Progressing     Problem: Safety - Adult  Goal: Free from fall injury  Outcome: Progressing     Problem: Discharge Planning  Goal: Discharge to home or other facility with appropriate resources  Outcome: Progressing     Problem: Chronic Conditions and Co-morbidities  Goal: Patient's chronic conditions and co-morbidity symptoms are monitored and maintained or improved  Outcome: Progressing     Problem: Nutrition  Goal: Nutrient intake appropriate for maintaining nutritional needs  Outcome: Progressing

## 2025-06-19 VITALS
SYSTOLIC BLOOD PRESSURE: 116 MMHG | OXYGEN SATURATION: 97 % | HEIGHT: 61 IN | DIASTOLIC BLOOD PRESSURE: 61 MMHG | RESPIRATION RATE: 18 BRPM | BODY MASS INDEX: 22.84 KG/M2 | HEART RATE: 71 BPM | TEMPERATURE: 98.2 F | WEIGHT: 121 LBS

## 2025-06-19 PROCEDURE — 97161 PT EVAL LOW COMPLEX 20 MIN: CPT | Mod: GP

## 2025-06-19 PROCEDURE — 99239 HOSP IP/OBS DSCHRG MGMT >30: CPT

## 2025-06-19 PROCEDURE — 2500000004 HC RX 250 GENERAL PHARMACY W/ HCPCS (ALT 636 FOR OP/ED): Performed by: INTERNAL MEDICINE

## 2025-06-19 PROCEDURE — 2500000001 HC RX 250 WO HCPCS SELF ADMINISTERED DRUGS (ALT 637 FOR MEDICARE OP): Performed by: INTERNAL MEDICINE

## 2025-06-19 PROCEDURE — 97165 OT EVAL LOW COMPLEX 30 MIN: CPT | Mod: GO

## 2025-06-19 PROCEDURE — G0378 HOSPITAL OBSERVATION PER HR: HCPCS

## 2025-06-19 RX ADMIN — POLYETHYLENE GLYCOL 3350 17 G: 17 POWDER, FOR SOLUTION ORAL at 09:36

## 2025-06-19 RX ADMIN — CHOLECALCIFEROL (VITAMIN D3) 10 MCG (400 UNIT) TABLET 10 MCG: at 10:26

## 2025-06-19 ASSESSMENT — COGNITIVE AND FUNCTIONAL STATUS - GENERAL
DAILY ACTIVITIY SCORE: 20
WALKING IN HOSPITAL ROOM: A LITTLE
STANDING UP FROM CHAIR USING ARMS: A LITTLE
WALKING IN HOSPITAL ROOM: A LITTLE
CLIMB 3 TO 5 STEPS WITH RAILING: A LITTLE
DRESSING REGULAR LOWER BODY CLOTHING: A LITTLE
TOILETING: A LITTLE
STANDING UP FROM CHAIR USING ARMS: A LITTLE
DRESSING REGULAR LOWER BODY CLOTHING: A LITTLE
DRESSING REGULAR UPPER BODY CLOTHING: A LITTLE
TOILETING: A LITTLE
MOVING TO AND FROM BED TO CHAIR: A LITTLE
DRESSING REGULAR UPPER BODY CLOTHING: A LITTLE
MOBILITY SCORE: 18
HELP NEEDED FOR BATHING: A LITTLE
MOBILITY SCORE: 20
MOVING FROM LYING ON BACK TO SITTING ON SIDE OF FLAT BED WITH BEDRAILS: A LITTLE
MOVING TO AND FROM BED TO CHAIR: A LITTLE
HELP NEEDED FOR BATHING: A LITTLE
TURNING FROM BACK TO SIDE WHILE IN FLAT BAD: A LITTLE
CLIMB 3 TO 5 STEPS WITH RAILING: A LITTLE
PERSONAL GROOMING: A LITTLE
DAILY ACTIVITIY SCORE: 19

## 2025-06-19 ASSESSMENT — PAIN - FUNCTIONAL ASSESSMENT
PAIN_FUNCTIONAL_ASSESSMENT: 0-10

## 2025-06-19 ASSESSMENT — PAIN SCALES - GENERAL
PAINLEVEL_OUTOF10: 0 - NO PAIN

## 2025-06-19 ASSESSMENT — ACTIVITIES OF DAILY LIVING (ADL)
ADL_ASSISTANCE: INDEPENDENT
LACK_OF_TRANSPORTATION: NO

## 2025-06-19 NOTE — DISCHARGE SUMMARY
Discharge Diagnosis  Syncope and collapse           Issues Requiring Follow-Up  Follow up with PCP as needed     Discharge Meds     Medication List      CHANGE how you take these medications     estradiol 0.01 % (0.1 mg/gram) vaginal cream; Commonly known as:   Estrace; Insert a pea-sized amount into vagina three times per week at   bedtime; What changed: how much to take, how to take this, when to take   this     CONTINUE taking these medications     acetaminophen 325 mg tablet; Commonly known as: Tylenol   calcium carbonate-vitamin D3 600 mg-5 mcg (200 unit) tablet   cholecalciferol 10 mcg (400 units) tablet; Commonly known as: Vitamin   D-3   denosumab 60 mg/mL syringe; Commonly known as: Prolia; Inject 1 mL (60   mg total) under the skin every 6 months.   dextran 70-hypromellose (PF) 0.1-0.3 % ophthalmic solution; Commonly   known as: Bion Tears   meclizine 12.5 mg tablet; Commonly known as: Antivert; Take 1 tablet   (12.5 mg) by mouth 2 times a day as needed for dizziness or nausea.   mupirocin 2 % ointment; Commonly known as: Bactroban; Apply topically 3   times a day. apply to affected areas of infection for 5 days   One-A-Day Women's 50 Plus 400-20 mcg tablet; Generic drug:   mv,Ca,min-folic acid-vit K1   ProAir RespiClick 90 mcg/actuation aerosol powdr breath activated   inhaler; Generic drug: albuterol; Inhale 2 puffs every 6 hours if needed   for wheezing.   triamcinolone 0.1 % cream; Commonly known as: Kenalog; Apply topically 2   times a day as needed for rash. Apply to affected area 1-2 times daily as   needed.       Test Results Pending At Discharge  Pending Labs       No current pending labs.            Hospital Course  Grace Ahn is a 89 y.o. female with PMHx of dementia, SILVA, HTN, syncope, vertigo, osteopenia who [place] [facility] [place] after being found on the floor of her bathroom at her independent living. Per ED handoff pt was somnolent when she arrived and unsure how she ended up on  the floor. However, pt stated that she was on the toilet and was not feeling well and was able to lower herself to the ground and press her call button for help. She denies syncope or loss of consciousness. She feels she is currently at her baseline. She denies CP, SOB, recent fever, illness, N/V/C/D, lightheadedness, dizziness, weakness.   ED course: Vitals significant for elevated BP   Labs largely unremarkable   HST 30, 25   CRP 0.84, CK 27, TSH 3.25   Flu/COVID negative   CXR - Hyperinflation. Vague increased opacity of the left base may represent atelectasis and/or small infiltrate   CT cerv spine - No evidence of an acute fx. Degenerative changes   CTH - No evidence of an acute intracranial process  Meds given: None     Observation course: Pt was admitted to observation for ongoing management and monitoring. Echo was done with findings similar to previous study done in 2020. Pt was evaluated by PT/OT who recommended low intensity care going forward. She walked the unit well and had no further complaints/concerns while admitted. TCC and SW discussed options with family but it was ultimately decided pt will return home. She was medically cleared for discharge     DC plan: Follow up with PCP as needed.     Labs and vitals stable for discharge. Discharged in stable and satisfactory condition.     Discussed with Dr. Monaco and the interdisciplinary team      Pertinent Physical Exam At Time of Discharge  Physical Exam  Constitutional:       General: She is not in acute distress.     Appearance: Normal appearance. She is not ill-appearing or toxic-appearing.   HENT:      Head: Normocephalic and atraumatic.   Cardiovascular:      Rate and Rhythm: Normal rate and regular rhythm.      Pulses: Normal pulses.      Heart sounds: Normal heart sounds. No murmur heard.     No friction rub. No gallop.   Pulmonary:      Effort: Pulmonary effort is normal.      Breath sounds: Normal breath sounds. No wheezing, rhonchi or rales.    Abdominal:      General: There is no distension.      Palpations: Abdomen is soft. There is no mass.      Tenderness: There is no abdominal tenderness.   Musculoskeletal:      Right lower leg: No edema.      Left lower leg: No edema.   Skin:     General: Skin is warm and dry.   Neurological:      General: No focal deficit present.      Mental Status: She is alert and oriented to person, place, and time.   Psychiatric:         Mood and Affect: Mood normal.         Behavior: Behavior normal.         Outpatient Follow-Up  Future Appointments   Date Time Provider Department Center   6/26/2025 10:30 AM Naima Quiñonez MD [id number] [facility]   7/15/2025  9:30 AM Meenakshi Pierce MD [id number] [facility]   7/24/2025 11:15 AM Leona Jones MD [id number] [facility]   8/19/2025  9:30 AM Tessy Brooks MD [id number] [facility]   11/13/2025 10:45 AM Leona Jones MD [id number] [facility]   11/20/2025 10:30 AM INF 01 [facility]         Yari Crook PA-C

## 2025-06-19 NOTE — CARE PLAN
The patient's goals for the shift include  remain free from falls/injury    The clinical goals for the shift include patient will remain HDS throughout shift

## 2025-06-19 NOTE — PROGRESS NOTES
Called patient's daughter, Josephine, to discuss discharge planning- patient will not qualify for SNF placement, left VM for daughter. SW to discuss discharge options.    Addendum: Received call from patient's son in law, Ozzy. He stated patient's daughter is not feelings well and requested that communication go through him. Ozzy is considering SNF placement, private pay. He identified [facility]. Depending on daily rate and patient's feelings, patient may dc home with [facility]. SW will follow.    Addendum: Received call from patient's son in law. Discussed placement details- [facility] private [facility], $333 per day for a shared room. Son in law stated that patient would not do well with shared room and [facility] Care team aware.

## 2025-06-19 NOTE — CARE PLAN
The patient's goals for the shift include      The clinical goals for the shift include Patient will remain HDS throughout shift    Over the shift, the patient did not make progress toward the following goals.      Problem: Safety - Adult  Goal: Free from fall injury  Outcome: Progressing     Problem: Discharge Planning  Goal: Discharge to home or other facility with appropriate resources  Outcome: Progressing     Problem: Chronic Conditions and Co-morbidities  Goal: Patient's chronic conditions and co-morbidity symptoms are monitored and maintained or improved  Outcome: Progressing     Problem: Nutrition  Goal: Nutrient intake appropriate for maintaining nutritional needs  Outcome: Progressing

## 2025-06-19 NOTE — PROGRESS NOTES
06/19/25 1123   Discharge Planning   Living Arrangements Alone   Support Systems Children;Family members   Care Facility Name [facility] with private aides   Home or Post Acute Services Post acute facilities (Rehab/SNF/etc)   Type of Post Acute Facility Services Assisted living;Skilled nursing   Expected Discharge Disposition Short Term A   Does the patient need discharge transport arranged? Yes   RoundTrip coordination needed? Yes   Financial Resource Strain   How hard is it for you to pay for the very basics like food, housing, medical care, and heating? Not hard   Housing Stability   In the last 12 months, was there a time when you were not able to pay the mortgage or rent on time? N   At any time in the past 12 months, were you homeless or living in a shelter (including now)? N   Transportation Needs   In the past 12 months, has lack of transportation kept you from medical appointments or from getting medications? no   In the past 12 months, has lack of transportation kept you from meetings, work, or from getting things needed for daily living? No     Patient was discharged from [facility] on 6/5/25.  She returned to [facility] with private aides.  Patient back after a fall in her apartment.  PT/OT recommending LOW intensity.  Patient has traditional medicare and is under obs status so is not eligible for SNF placement unless she upgrades to inpatient status.  No medicar reason for upgrade at this time.  Delores GALINDO to talk to family about discharge options.  Will continue to follow for discharge planning needs.

## 2025-06-19 NOTE — PROGRESS NOTES
Physical Therapy    Physical Therapy Evaluation & Treatment    Patient Name: Grace Ahn  MRN: [id number]  Today's Date: 6/19/2025   Time Calculation  Start Time: 1046  Stop Time: 1102  Time Calculation (min): 16 min  108/108-A    Assessment/Plan   PT Assessment  PT Assessment Results: Decreased endurance, Impaired balance, Decreased mobility, Decreased safety awareness, Decreased cognition  Rehab Prognosis: Good  Strengths: Premorbid level of function  Barriers to Participation: Comorbidities  End of Session Communication: Bedside nurse, Care Coordinator  Assessment Comment: Pt would benefit from continued low intensity PT to improve independence with functional mobility.  Pt would benefit from the use of a walker at all times to stabilize her gait pattern.  PT will continue to follow pt during this hospital stay.  End of Session Patient Position: Bed, 3 rail up, Alarm on  IP OR SWING BED PT PLAN  Inpatient or Swing Bed: Inpatient  PT Plan  Treatment/Interventions: Bed mobility, Transfer training, Gait training, Balance training, Strengthening, Endurance training, Therapeutic exercise, Therapeutic activity (Pt education)  PT Plan: Ongoing PT  PT Frequency: 2 times per week  PT Discharge Recommendations: Low intensity level of continued care  Equipment Recommended upon Discharge: Wheeled walker  PT Recommended Transfer Status: Assist x1, Assistive device  PT - OK to Discharge: Yes (OK to discharge from acute PT services to the next level of care when cleared by the medical team.)    Current Problem:  Problem List[1]    Subjective     General Visit Information:  General  Reason for Referral: Difficulty walking s/p syncope and fall.  Referred By: Dr. Monaco  Prior to Session Communication: Bedside nurse  Patient Position Received: Bed, 3 rail up, Alarm on    Home Living:  Home Living  Home Adaptive Equipment:  (pt reports she migh ta have walker but is not sure.)  Home Living Comments: Pt resides alone at [facility] in [place].    Prior Level of Function:  Prior Function Per Pt/Caregiver Report  Prior Function Comments: Pt reports she ambulates independently, independent with ADLs and laundry, walks to the dining room for meals.    Precautions:  Precautions  Medical Precautions: Fall precautions     Objective     Pain:  Pain Assessment  Pain Assessment: 0-10  0-10 (Numeric) Pain Score: 0 - No pain    Cognition:  Cognition  Orientation Level: Disoriented to situation  Insight: Mild  Impulsive: Mildly    Functional Assessments:     Bed Mobility  Bed Mobility:  (supine<>sit modified independent with head of bed elevated.)  Transfers  Transfer:  (sit<>stand close supervision)  Ambulation/Gait Training  Ambulation/Gait Training Performed: Yes  Ambulation/Gait Training 1  Comments/Distance (ft) 1: 100 feet without device, min assist, intermittent loss of balance.  Ambulation/Gait Training 2  Comments/Distance (ft) 2: 100 feet with front wheeled walker, close supervision, steady gait with walker, cues to keep walker close during approach to bed to sit.          Extremity/Trunk Assessments:        RLE   RLE : Within Functional Limits  LLE   LLE : Within Functional Limits    Outcome Measures:  [facility] Basic Mobility  Turning from your back to your side while in a flat bed without using bedrails: A little  Moving from lying on your back to sitting on the side of a flat bed without using bedrails: A little  Moving to and from bed to chair (including a wheelchair): A little  Standing up from a chair using your arms (e.g. wheelchair or bedside chair): A little  To walk in hospital room: A little  Climbing 3-5 steps with railing: A little  Basic Mobility - Total Score: 18                            Goals:  Encounter Problems       Encounter Problems (Active)       PT Problem       PT Goal 1 (Progressing)       Start:  06/19/25    Expected End:  07/03/25       Pt able to perform bed mobility complete independence.           PT Goal 2  (Progressing)       Start:  06/19/25    Expected End:  07/03/25       Pt able to complete all transfers modified independent.            PT Goal 3 (Progressing)       Start:  06/19/25    Expected End:  07/03/25       Pt able to ambulate 150 feet with front wheeled walker modified independent.                Education Documentation  Precautions, taught by Marge Jay, PT at 6/19/2025 11:25 AM.  Learner: Patient  Readiness: Acceptance  Method: Explanation  Response: Needs Reinforcement  Comment: per above    Mobility Training, taught by Marge Jay PT at 6/19/2025 11:25 AM.  Learner: Patient  Readiness: Acceptance  Method: Explanation  Response: Needs Reinforcement  Comment: per above    Education Comments  No comments found.             [1]   Patient Active Problem List  Diagnosis    AK (actinic keratosis)    Astigmatism    Blepharitis    Degeneration of lumbar or lumbosacral intervertebral disc    Executive function deficit    Genitourinary syndrome of menopause    Frequent urination    Obstructive sleep apnea, adult    Osteoporosis    Thyroid nodule    Lichen simplex chronicus    Nocturia    Nocturnal hypoxemia    Osteoarthritis, knee    Personal history of other malignant neoplasm of skin    Rosacea, unspecified    Xerosis cutis    Medicare annual wellness visit, subsequent    Vertigo    Syncope and collapse

## 2025-06-19 NOTE — NURSING NOTE
AVS discussed with pt. Iv and tele removed. Pt belongings secured with pt prior to discharge. Pt discharged to home

## 2025-06-19 NOTE — PROGRESS NOTES
Occupational Therapy    Evaluation    Patient Name: Grace Ahn  MRN: [id number]  Department: [facility] A 1  Room: [address] [place] [address]  Today's Date: 6/19/2025  Time Calculation  Start Time: 0853  Stop Time: 0910  Time Calculation (min): 17 min        Assessment:  OT Assessment: Pt presents with reduced safety awareness, balance, endurance and strength, impeding ADL performance and functional mobility. Pt would benefit from skilled OT services to address these deficits and facilitate highest level of function.  Prognosis: Good  Barriers to Discharge Home: Caregiver assistance, Cognition needs, Physical needs  Caregiver Assistance: Patient lives alone and/or does not have reliable caregiver assistance  Cognition Needs: Insight of patient limited regarding functional ability/needs  Physical Needs: Ambulating household distances limited by function/safety, Intermittent ADL assistance needed, High falls risk due to function or environment  Evaluation/Treatment Tolerance: Patient tolerated treatment well  Medical Staff Made Aware: Yes  End of Session Communication: Bedside nurse  End of Session Patient Position: Up in chair, Alarm on  OT Assessment Results: Decreased ADL status, Decreased safe judgment during ADL, Decreased endurance, Decreased functional mobility, Decreased IADLs  Prognosis: Good  Barriers to Discharge: Decreased caregiver support  Evaluation/Treatment Tolerance: Patient tolerated treatment well  Medical Staff Made Aware: Yes  Strengths: Capable of completing ADLs semi/independent, Housing layout, Living arrangement secure, Premorbid level of function  Barriers to Participation: Comorbidities  Plan:  Treatment Interventions: ADL retraining, Functional transfer training, UE strengthening/ROM, Endurance training, Patient/family training, Equipment evaluation/education, Compensatory technique education  OT Frequency: 3 times per week  OT Discharge Recommendations: Low intensity level of continued care, 24 hr supervision  due to cognition  OT Recommended Transfer Status: Assist of 1  OT - OK to Discharge: Yes (Per POC)  Treatment Interventions: ADL retraining, Functional transfer training, UE strengthening/ROM, Endurance training, Patient/family training, Equipment evaluation/education, Compensatory technique education    Subjective     OT Visit Info:  OT Received On: 06/19/25  General:  General  Reason for Referral: 89 y.o. female s/p fall and found on the floor in her facility.  Referred By: Jesus Monaco MD  Past Medical History Relevant to Rehab: Medical History[1]  Family/Caregiver Present: No  Prior to Session Communication: Bedside nurse  Patient Position Received: Bed, 3 rail up, Alarm on  Preferred Learning Style: auditory, verbal, visual  General Comment: Pt agreeable and cooperative to OT eval. Intermittently pleasantly confused.  Precautions:  Medical Precautions: Fall precautions      Pain:  Pain Assessment  Pain Assessment: 0-10  0-10 (Numeric) Pain Score: 0 - No pain    Objective   Cognition:  Orientation Level: Disoriented to situation  Memory: Exceptions to WFL (Intermittent confusion/forgetfulness regarding home set up)  Insight: Mild (Mild cognitive deficits noted)           Home Living:  Type of Home: Independent living  Lives With: Alone  Home Adaptive Equipment:  (Pt reports might have a rollator, is unsure)  Home Layout: One level  Home Access: No concerns  Bathroom Accessibility: Pt could not recall bathroom set up  Home Living Comments: Pt limited historian  Prior Function:  Level of Refugio: Independent with ADLs and functional transfers, Needs assistance with homemaking  Receives Help From:  (ILF staff)  ADL Assistance: Independent (Reports is ind in all ADLs)  Homemaking Assistance: Needs assistance (ILF staff completes and provides iADLs (cleaning, laundry and meals))  Ambulatory Assistance: Independent (Reports use of no AD)  Prior Function Comments: Pt denies any falls and (-) drives. Pt limited  historian     ADL:  UE Dressing Assistance: Stand by  UE Dressing Deficit: Setup, Fasteners  LE Dressing Assistance: Minimal  LE Dressing Deficit: Don/doff R sock (Min A to manage over toes)  Activity Tolerance:  Endurance: Tolerates 10 - 20 min exercise with multiple rests  Bed Mobility/Transfers: Bed Mobility  Bed Mobility: Yes  Bed Mobility 1  Bed Mobility 1: Supine to sitting  Level of Assistance 1: Close supervision  Bed Mobility Comments 1: Able to manage BLE/trunk toward EOB, HOB slightly elevated. Inc time.    Transfers  Transfer: Yes  Transfer 1  Transfer From 1: Bed to  Transfer to 1: Stand  Technique 1: Sit to stand  Transfer Level of Assistance 1: Minimum assistance  Trials/Comments 1: No device used, Min A for steadying upon standing.  Transfers 2  Transfer From 2: Stand to  Transfer to 2: Chair with arms  Technique 2: Sit to stand, Stand to sit  Transfer Device 2: Walker, Gait belt  Transfer Level of Assistance 2: Contact guard  Trials/Comments 2: Improvement noted with use of FWW. Cues for proper hand placement and body positioning.      Functional Mobility:  Functional Mobility  Functional Mobility Performed: Yes  Functional Mobility 1  Device 1: Rolling walker  Assistance 1: Close supervision, Contact guard  Comments 1: Pt completed x min household distance functional mobility around the room with FWW. Close Sup-CGA for safety with cues for sequencing  Sitting Balance:  Static Sitting Balance  Static Sitting-Balance Support: Feet supported  Static Sitting-Level of Assistance: Close supervision  Static Sitting-Comment/Number of Minutes: EOB  Dynamic Sitting Balance  Dynamic Sitting-Balance Support: Feet supported  Dynamic Sitting-Level of Assistance: Close supervision, Contact guard  Dynamic Sitting-Balance: Forward lean, Reaching for objects, Reaching across midline, Lateral lean  Dynamic Sitting-Comments: EOB  Standing Balance:  Static Standing Balance  Static Standing-Balance Support: Bilateral  upper extremity supported  Static Standing-Level of Assistance: Close supervision  Static Standing-Comment/Number of Minutes: FWW  Dynamic Standing Balance  Dynamic Standing-Balance Support: Bilateral upper extremity supported  Dynamic Standing-Level of Assistance: Contact guard  Dynamic Standing-Balance: Turning  Dynamic Standing-Comments: FWW      Vision:Vision - Basic Assessment  Current Vision: No visual deficits  Sensation:  Sensation Comment: Denies any N/T  Strength:  Strength Comments: WFL shoulder flexion  Perception:  Inattention/Neglect: Appears intact  Coordination:  Movements are Fluid and Coordinated: Yes   Hand Function:  Gross Grasp: Functional  Coordination: Functional  Extremities: RUE   RUE : Exceptions to WFL  RUE Strength  RUE Overall Strength: Greater than or equal to 3/5 as evidenced by functional mobility and LUE   LUE: Exceptions to WFL  LUE Strength  LUE Overall Strength: Greater than or equal to 3/5 as evidenced by functional mobility    Outcome Measures:[facility] Daily Activity  Putting on and taking off regular lower body clothing: A little  Bathing (including washing, rinsing, drying): A little  Putting on and taking off regular upper body clothing: A little  Toileting, which includes using toilet, bedpan or urinal: A little  Taking care of personal grooming such as brushing teeth: A little  Eating Meals: None  Daily Activity - Total Score: 19        Education Documentation  Body Mechanics, taught by Monique Myrick OT at 6/19/2025 10:23 AM.  Learner: Patient  Readiness: Acceptance  Method: Explanation  Response: Verbalizes Understanding    Precautions, taught by Monique Myrick OT at 6/19/2025 10:23 AM.  Learner: Patient  Readiness: Acceptance  Method: Explanation  Response: Verbalizes Understanding    ADL Training, taught by Monique Myrick OT at 6/19/2025 10:23 AM.  Learner: Patient  Readiness: Acceptance  Method: Explanation  Response: Verbalizes Understanding    Education  Comments  No comments found.        OP EDUCATION:       Goals:  Encounter Problems       Encounter Problems (Active)       ADLs       Patient will perform UB and LB sponge bathing with set-up level of assistance while seated and use of adaptive techniques.       Start:  06/19/25    Expected End:  07/03/25            Patient with complete upper body dressing with independent level of assistance donning and doffing all UE clothes with PRN adaptive equipment while seated.       Start:  06/19/25    Expected End:  07/03/25            Patient with complete lower body dressing with modified independent level of assistance donning and doffing all LE clothes  with PRN adaptive equipment while seated.       Start:  06/19/25    Expected End:  07/03/25            Patient will complete daily grooming tasks with modified independent level of assistance and PRN adaptive equipment while standing.       Start:  06/19/25    Expected End:  07/03/25            Patient will complete toileting including hygiene clothing management/hygiene with supervision level of assistance and raised toilet seat and grab bars.       Start:  06/19/25    Expected End:  07/03/25               MOBILITY       Patient will perform Functional mobility x Household distances/Community Distances with supervision level of assistance and least restrictive device in order to improve safety and functional mobility.       Start:  06/19/25    Expected End:  07/03/25               TRANSFERS       Patient will perform bed mobility modified independent level of assistance and bed rails in order to improve safety and independence with mobility       Start:  06/19/25    Expected End:  07/03/25            Patient will complete sit to stand transfer with supervision level of assistance and least restrictive device in order to improve safety and prepare for out of bed mobility.       Start:  06/19/25    Expected End:  07/03/25                                     [1]   Past  Medical History:  Diagnosis Date    Acute cystitis without hematuria 02/10/2020    Acute cystitis without hematuria    Adverse effect of unspecified drugs, medicaments and biological substances, initial encounter 09/09/2021    Adverse reaction to drug, initial encounter    Allergic 1945    Delusional disorders 11/02/2021    Delusions    Laceration without foreign body, left lower leg, initial encounter 03/15/2020    Skin tear of left lower leg without complication    Other fracture of left patella, initial encounter for closed fracture 02/16/2020    Other fracture of left patella, initial encounter for closed fracture    Personal history of other specified conditions 02/24/2020    History of nocturia    Personal history of urinary (tract) infections 12/08/2021    History of urinary tract infection    Unspecified fracture of left patella, initial encounter for closed fracture 02/06/2020    Left patella fracture

## 2025-06-20 ENCOUNTER — PATIENT OUTREACH (OUTPATIENT)
Dept: PRIMARY CARE | Facility: CLINIC | Age: OVER 89
End: 2025-06-20
Payer: MEDICARE

## 2025-06-20 NOTE — PROGRESS NOTES
Discharge Facility:[facility]  Discharge Diagnosis:Syncope and collapse   Admission Date:6/18/25  Discharge Date: 6/19/25    PCP Appointment Date:6/26/25  Specialist Appointment Date:   Hospital Encounter and Summary Linked: YesED to Hosp-Admission (Discharged) with Jesus Monaco MD; Roque Parrish DO (06/18/2025)   See discharge assessment below for further details  Wrap Up  Wrap Up Additional Comments: discused discharge patiemt stated that she is improving. provided contact information encouraged call with questions. (6/20/2025  9:38 AM)    Engagement  Call Start Time: 0938 (6/20/2025  9:38 AM)    Medications  Medications reviewed with patient/caregiver?: Yes (no new meds spoke to her daeyade) (6/20/2025  9:38 AM)  Is the patient having any side effects they believe may be caused by any medication additions or changes?: No (6/20/2025  9:38 AM)  Does the patient have all medications ordered at discharge?: Not applicable (6/20/2025  9:38 AM)  Is the patient taking all medications as directed (includes completed medication regime)?: Yes (6/20/2025  9:38 AM)    Appointments  Does the patient have a primary care provider?: Yes (6/20/2025  9:38 AM)  Care Management Interventions: Verified appointment date/time/provider (6/20/2025  9:38 AM)  Has the patient kept scheduled appointments due by today?: Yes (6/20/2025  9:38 AM)    Self Management  What is the home health agency?: N/A (6/20/2025  9:38 AM)  Has home health visited the patient within 72 hours of discharge?: Not applicable (6/20/2025  9:38 AM)  What Durable Medical Equipment (DME) was ordered?: N/A (6/20/2025  9:38 AM)  Has all Durable Medical Equipment (DME) been delivered?: No (6/20/2025  9:38 AM)    Patient Teaching  Does the patient have access to their discharge instructions?: Yes (spoke to her daughter and son in law) (6/20/2025  9:38 AM)  Care Management Interventions: Reviewed instructions with patient (6/20/2025  9:38 AM)  What is the patient's  perception of their health status since discharge?: Improving (6/20/2025  9:38 AM)  Is the patient/caregiver able to teach back the hierarchy of who to call/visit for symptoms/problems? PCP, Specialist, Home Health nurse, Urgent Care, ED, 911: Yes (spoke to her daughter) (6/20/2025  9:38 AM)

## 2025-06-25 NOTE — ED PROVIDER NOTES
HPI   Chief Complaint   Patient presents with    Syncope       HPI: 89-year-old female with a history of dementia presents for syncope.  She is unsure how she fell and staff at her independent living facility is unsure as to how long she was on the ground.  She is denying chest pain and headache      Limitations to history: None  Independent Historians: Patient, EMS  External Records Reviewed: HIE, outpatient notes, inpatient notes  ------------------------------------------------------------------------------------------------------------------------------------------  ROS: a ten point review of systems was performed and was negative except as per HPI.  ------------------------------------------------------------------------------------------------------------------------------------------  PMH / PSH: as per HPI, otherwise reviewed in EMR  MEDS: as per HPI, otherwise reviewed in EMR  ALLERGIES: as per HPI, otherwise reviewed in EMR  SocH:  as per HPI, otherwise reviewed in EMR  FH:  as per HPI, otherwise reviewed in EMR  ------------------------------------------------------------------------------------------------------------------------------------------  Physical Exam:  VS: As documented in the triage note and EMR flowsheet from this visit was reviewed  General: Well appearing. No acute distress.   Eyes:  Extraocular movements grossly intact. No scleral icterus. No discharge  HEENT:  Normocephalic.  Atraumatic  Neck: Moves neck freely. No gross masses  CV: Regular rhythm. No murmurs, rubs or gallops   Resp: Clear to auscultation bilaterally. No respiratory distress.    GI: Soft, no masses, nontender. No rebound tenderness or guarding  MSK: Symmetric muscle bulk. No deformities. No lower extremity edema.    Skin: Warm, dry, intact.   Neuro: No focal deficits.  A&O to person and place but not time  Psych: Confused but alert and  conversive  ------------------------------------------------------------------------------------------------------------------------------------------  Hospital Course / Medical Decision Making:  Independent Interpretations: CT head, C-spine  EKG as interpreted by me: Sinus rhythm at 65 bpm with a first-degree AV block with no bundle branch block and no signs of acute ischemia    MDM: 89-year-old female presents for syncope.  She is unsure how she fell and is denying chest pain, headache and has no signs of injury.  EKG is nonischemic.  Troponin mildly elevated.  No major electrolyte abnormalities or acute kidney injury.  CT shows no acute intracranial process or cervical spine fracture.  The patient was admitted to the observation unit for further management    Discussion of Management with Other Providers:   I discussed the patient/results with: Emergency medicine team    Final diagnosis and disposition as below.    Results for orders placed or performed during the hospital encounter of 06/18/25  -ECG 12 lead:   Collection Time: 06/18/25  7:27 AM       Result                      Value             Ref Range           Ventricular Rate            65                BPM                 Atrial Rate                 65                BPM                 IL Interval                 216               ms                  QRS Duration                102               ms                  QT Interval                 450               ms                  QTC Calculation(Bazett)     468               ms                  P Axis                      14                degrees             R Axis                      -11               degrees             T Axis                      9                 degrees             QRS Count                   11                beats               Q Onset                     214               ms                  P Onset                     106               ms                  P Offset                     164               ms                  T Offset                    439               ms                  QTC Fredericia              462               ms             -CBC and Auto Differential:   Collection Time: 06/18/25  7:28 AM       Result                      Value             Ref Range           WBC                         8.6               4.4 - 11.3 x*       nRBC                        0.0               0.0 - 0.0 /1*       RBC                         4.28              4.00 - 5.20 *       Hemoglobin                  13.6              12.0 - 16.0 *       Hematocrit                  41.8              36.0 - 46.0 %       MCV                         98                80 - 100 fL         MCH                         31.8              26.0 - 34.0 *       MCHC                        32.5              32.0 - 36.0 *       RDW                         12.8              11.5 - 14.5 %       Platelets                   272               150 - 450 x1*       Neutrophils %               48.9              40.0 - 80.0 %       Immature Granulocytes *     0.2               0.0 - 0.9 %         Lymphocytes %               37.5              13.0 - 44.0 %       Monocytes %                 8.4               2.0 - 10.0 %        Eosinophils %               4.5               0.0 - 6.0 %         Basophils %                 0.5               0.0 - 2.0 %         Neutrophils Absolute        4.22              1.60 - 5.50 *       Immature Granulocytes *     0.02              0.00 - 0.50 *       Lymphocytes Absolute        3.23 (H)          0.80 - 3.00 *       Monocytes Absolute          0.72              0.05 - 0.80 *       Eosinophils Absolute        0.39              0.00 - 0.40 *       Basophils Absolute          0.04              0.00 - 0.10 *  -Magnesium:   Collection Time: 06/18/25  7:28 AM       Result                      Value             Ref Range           Magnesium                   1.89              1.60 - 2.40  *  -Comprehensive metabolic panel:   Collection Time: 06/18/25  7:28 AM       Result                      Value             Ref Range           Glucose                     124 (H)           74 - 99 mg/dL       Sodium                      135 (L)           136 - 145 mm*       Potassium                   4.2               3.5 - 5.3 mm*       Chloride                    104               98 - 107 mmo*       Bicarbonate                 21                21 - 32 mmol*       Anion Gap                   14                10 - 20 mmol*       Urea Nitrogen               21                6 - 23 mg/dL        Creatinine                  0.93              0.50 - 1.05 *       eGFR                        57 (L)            >60 mL/min/1*       Calcium                     9.2               8.6 - 10.3 m*       Albumin                     3.8               3.4 - 5.0 g/*       Alkaline Phosphatase        72                33 - 136 U/L        Total Protein               7.3               6.4 - 8.2 g/*       AST                         19                9 - 39 U/L          Bilirubin, Total            0.5               0.0 - 1.2 mg*       ALT                         8                 7 - 45 U/L     -Troponin I, High Sensitivity:   Collection Time: 06/18/25  7:28 AM       Result                      Value             Ref Range           Troponin I, High Sensi*     30 (H)            0 - 13 ng/L    -Troponin I, High Sensitivity:   Collection Time: 06/18/25  8:35 AM       Result                      Value             Ref Range           Troponin I, High Sensi*     25 (H)            0 - 13 ng/L    -Creatine Kinase:   Collection Time: 06/18/25  8:35 AM       Result                      Value             Ref Range           Creatine Kinase             27                0 - 215 U/L       CT head wo IV contrast   Final Result    No evidence of an acute intracranial process.          MACRO:    None.          Signed by: Kamari Colindres 6/18/2025 8:51  AM    Dictation workstation:   [id number]     CT cervical spine wo IV contrast   Final Result    No evidence of an acute fracture. Degenerative changes.          MACRO:    None.          Signed by: Kamari Colindres 6/18/2025 8:54 AM    Dictation workstation:   [id number]     XR chest 1 view   Final Result    1.  Hyperinflation. Vague increased opacity of the left base may    represent atelectasis and/or small infiltrate.                      MACRO:    None.          Signed by: Angel Arevalo 6/18/2025 7:57 AM    Dictation workstation:   [id number]                   Patient History   Medical History[1]  Surgical History[2]  Family History[3]  Social History[4]    Physical Exam   ED Triage Vitals   Temp Heart Rate Resp BP   06/18/25 0800 06/18/25 0721 06/18/25 0721 06/18/25 0725   36.2 °C (97.1 °F) 64 20 163/66      SpO2 Temp Source Heart Rate Source Patient Position   06/18/25 0725 06/18/25 1303 06/18/25 1303 06/18/25 1303   (!) 92 % Temporal Monitor Lying      BP Location FiO2 (%)     06/18/25 1303 --     Right arm        Physical Exam      ED Course & MDM   Diagnoses as of 06/25/25 1809   Syncope and collapse                 No data recorded     [place] Coma Scale Score: 14 (06/19/25 1020 : Briana Hardin, RN)                           Medical Decision Making      Procedure  Procedures         [1]   Past Medical History:  Diagnosis Date    Acute cystitis without hematuria 02/10/2020    Acute cystitis without hematuria    Adverse effect of unspecified drugs, medicaments and biological substances, initial encounter 09/09/2021    Adverse reaction to drug, initial encounter    Allergic 1945    Delusional disorders 11/02/2021    Delusions    Laceration without foreign body, left lower leg, initial encounter 03/15/2020    Skin tear of left lower leg without complication    Other fracture of left patella, initial encounter for closed fracture 02/16/2020    Other fracture of left patella, initial encounter for closed  fracture    Personal history of other specified conditions 2020    History of nocturia    Personal history of urinary (tract) infections 2021    History of urinary tract infection    Unspecified fracture of left patella, initial encounter for closed fracture 2020    Left patella fracture   [2]   Past Surgical History:  Procedure Laterality Date    APPENDECTOMY  193    EYE SURGERY      HYSTERECTOMY  1977    MR HEAD ANGIO WO IV CONTRAST  2022    MR HEAD ANGIO WO IV CONTRAST 2022 [facility]    MR NECK ANGIO WO IV CONTRAST  2022    MR NECK ANGIO WO IV CONTRAST 2022 [facility]ICAL [facility]    OTHER SURGICAL HISTORY  12/10/2019    Tonsillectomy with adenoidectomy    OTHER SURGICAL HISTORY  12/10/2019    Hysterectomy total abdominal with removal of both ovaries    OTHER SURGICAL HISTORY  12/10/2019    Root canal procedure    OTHER SURGICAL HISTORY  12/10/2019    Breast biopsy    OTHER SURGICAL HISTORY  12/10/2019     section    OTHER SURGICAL HISTORY  2021    Cataract surgery    OTHER SURGICAL HISTORY  2022    Carpal tunnel surgery    OTHER SURGICAL HISTORY  2022    Varicose vein sclerotherapy    OTHER SURGICAL HISTORY  2022    Excision of squamous cell carcinoma    OTHER SURGICAL HISTORY  2022    Mohs surgery   [3]   Family History  Problem Relation Name Age of Onset    Cancer Father Davis Delarosa    [4]   Social History  Tobacco Use    Smoking status: Never    Smokeless tobacco: Never   Substance Use Topics    Alcohol use: Not Currently    Drug use: Never        Roque Parrish DO  [phone]

## 2025-06-26 ENCOUNTER — APPOINTMENT (OUTPATIENT)
Dept: PRIMARY CARE | Facility: CLINIC | Age: OVER 89
End: 2025-06-26
Payer: MEDICARE

## 2025-06-28 LAB
ATRIAL RATE: 65 BPM
P AXIS: 14 DEGREES
P OFFSET: 164 MS
P ONSET: 106 MS
PR INTERVAL: 216 MS
Q ONSET: 214 MS
QRS COUNT: 11 BEATS
QRS DURATION: 102 MS
QT INTERVAL: 450 MS
QTC CALCULATION(BAZETT): 468 MS
QTC FREDERICIA: 462 MS
R AXIS: -11 DEGREES
T AXIS: 9 DEGREES
T OFFSET: 439 MS
VENTRICULAR RATE: 65 BPM

## 2025-06-30 ASSESSMENT — DERMATOLOGY QUALITY OF LIFE (QOL) ASSESSMENT
RATE HOW BOTHERED YOU ARE BY EFFECTS OF YOUR SKIN PROBLEMS ON YOUR ACTIVITIES (EG, GOING OUT, ACCOMPLISHING WHAT YOU WANT, WORK ACTIVITIES OR YOUR RELATIONSHIPS WITH OTHERS): 3
RATE HOW EMOTIONALLY BOTHERED YOU ARE BY YOUR SKIN PROBLEM (FOR EXAMPLE, WORRY, EMBARRASSMENT, FRUSTRATION): 3
RATE HOW BOTHERED YOU ARE BY SYMPTOMS OF YOUR SKIN PROBLEM (EG, ITCHING, STINGING BURNING, HURTING OR SKIN IRRITATION): 3

## 2025-07-01 ENCOUNTER — PATIENT OUTREACH (OUTPATIENT)
Dept: PRIMARY CARE | Facility: CLINIC | Age: OVER 89
End: 2025-07-01

## 2025-07-01 ENCOUNTER — APPOINTMENT (OUTPATIENT)
Dept: DERMATOLOGY | Facility: CLINIC | Age: OVER 89
End: 2025-07-01
Payer: MEDICARE

## 2025-07-01 ASSESSMENT — DERMATOLOGY QUALITY OF LIFE (QOL) ASSESSMENT
RATE HOW BOTHERED YOU ARE BY EFFECTS OF YOUR SKIN PROBLEMS ON YOUR ACTIVITIES (EG, GOING OUT, ACCOMPLISHING WHAT YOU WANT, WORK ACTIVITIES OR YOUR RELATIONSHIPS WITH OTHERS): 3
RATE HOW BOTHERED YOU ARE BY EFFECTS OF YOUR SKIN PROBLEMS ON YOUR ACTIVITIES (EG, GOING OUT, ACCOMPLISHING WHAT YOU WANT, WORK ACTIVITIES OR YOUR RELATIONSHIPS WITH OTHERS): 3
RATE HOW EMOTIONALLY BOTHERED YOU ARE BY YOUR SKIN PROBLEM (FOR EXAMPLE, WORRY, EMBARRASSMENT, FRUSTRATION): 3
RATE HOW BOTHERED YOU ARE BY SYMPTOMS OF YOUR SKIN PROBLEM (EG, ITCHING, STINGING BURNING, HURTING OR SKIN IRRITATION): 3
RATE HOW EMOTIONALLY BOTHERED YOU ARE BY YOUR SKIN PROBLEM (FOR EXAMPLE, WORRY, EMBARRASSMENT, FRUSTRATION): 3
RATE HOW BOTHERED YOU ARE BY SYMPTOMS OF YOUR SKIN PROBLEM (EG, ITCHING, STINGING BURNING, HURTING OR SKIN IRRITATION): 3

## 2025-07-02 ENCOUNTER — OFFICE VISIT (OUTPATIENT)
Dept: DERMATOLOGY | Facility: CLINIC | Age: OVER 89
End: 2025-07-02
Payer: MEDICARE

## 2025-07-02 DIAGNOSIS — L30.9 DERMATITIS: Primary | ICD-10-CM

## 2025-07-02 PROCEDURE — RXMED WILLOW AMBULATORY MEDICATION CHARGE

## 2025-07-02 PROCEDURE — 99213 OFFICE O/P EST LOW 20 MIN: CPT | Performed by: DERMATOLOGY

## 2025-07-02 RX ORDER — TRIAMCINOLONE ACETONIDE 1 MG/G
CREAM TOPICAL 2 TIMES DAILY
Qty: 454 G | Refills: 1 | Status: SHIPPED | OUTPATIENT
Start: 2025-07-02

## 2025-07-02 ASSESSMENT — DERMATOLOGY PATIENT ASSESSMENT
ARE YOU TRYING TO GET PREGNANT: NO
DO YOU USE SUNSCREEN: OCCASIONALLY
DO YOU HAVE ANY NEW OR CHANGING LESIONS: YES
ARE YOU ON BIRTH CONTROL: NO
DO YOU USE A TANNING BED: NO
DO YOU HAVE IRREGULAR MENSTRUAL CYCLES: NO
ARE YOU AN ORGAN TRANSPLANT RECIPIENT: NO
HAVE YOU HAD OR DO YOU HAVE A STAPH INFECTION: NO
HAVE YOU HAD OR DO YOU HAVE VASCULAR DISEASE: NO

## 2025-07-02 ASSESSMENT — ITCH NUMERIC RATING SCALE: HOW SEVERE IS YOUR ITCHING?: 0

## 2025-07-02 ASSESSMENT — DERMATOLOGY QUALITY OF LIFE (QOL) ASSESSMENT
ARE THERE EXCLUSIONS OR EXCEPTIONS FOR THE QUALITY OF LIFE ASSESSMENT: NO
RATE HOW EMOTIONALLY BOTHERED YOU ARE BY YOUR SKIN PROBLEM (FOR EXAMPLE, WORRY, EMBARRASSMENT, FRUSTRATION): 0 - NEVER BOTHERED
RATE HOW BOTHERED YOU ARE BY SYMPTOMS OF YOUR SKIN PROBLEM (EG, ITCHING, STINGING BURNING, HURTING OR SKIN IRRITATION): 0 - NEVER BOTHERED
DATE THE QUALITY-OF-LIFE ASSESSMENT WAS COMPLETED: 67388
RATE HOW BOTHERED YOU ARE BY EFFECTS OF YOUR SKIN PROBLEMS ON YOUR ACTIVITIES (EG, GOING OUT, ACCOMPLISHING WHAT YOU WANT, WORK ACTIVITIES OR YOUR RELATIONSHIPS WITH OTHERS): 0 - NEVER BOTHERED

## 2025-07-02 ASSESSMENT — PATIENT GLOBAL ASSESSMENT (PGA): PATIENT GLOBAL ASSESSMENT: PATIENT GLOBAL ASSESSMENT:  1 - CLEAR

## 2025-07-02 NOTE — PROGRESS NOTES
Subjective     Grace Ahn is a 89 y.o. female who presents for the following: Rash (Arms and leg). Last derm visit 2/27/25 for Full Skin Exam. Here today for new itchy rash that appeared suddenly 6/25/25. She started triamcinolone. No new meds.      Meclizine started 6/4/25 for sudden onset vertigo. It is now resolved. She was then hospitalized 6/18/25 for syncope and collapse. tested for Covid, Flu 6/18/25 that was negative. It does not appear she received any new medications during that hospital stay per the record and per family report    Intake Questions  Do you have any new or changing Lesions?: Yes  Where are these new or changing lesion(s) located?: (Proxy-Rptd) (P) Rash arms and legs  For patients coming in for a Follow-up Visit:  Have there been any changes in your health since your last visit?: (Proxy-Rptd) (P) No  Are you an organ transplant recipient?: No  Have you had or do you have a Staph Infection?: No  Have you had or do you have Vacular Disease?: No  Do you use sunscreen?: Occasionally  Do you use a tanning bed?: No  Are you trying to get pregnant?: No  Are you on birth control?: No  Do you have irregular menstrual cycles?: No    Review of Systems:  No other skin or systemic complaints other than what is documented elsewhere in the note.    The following portions of the chart were reviewed this encounter and updated as appropriate:          Skin Cancer History  Biopsy Log Book  No skin cancers from Specimen Tracking.    Additional History      Specialty Problems          Dermatology Problems    AK (actinic keratosis)    Formatting of this note might be different from the original. To back, face, scalp Formatting of this note might be different from the original. Below nose,upper lip,left shoulder         Lichen simplex chronicus    Personal history of other malignant neoplasm of skin    Lesion 1: Squamous Cell Carcinoma. Year Diagnosed: 2014. November. Location: Forehead. Treatment(s): Staged  Excision. Excision performed by Dr. Keshawn Schultz (Plastics) in [place]     Lesion 2: Basal Cell Carcinoma. Year Diagnosed: 2014. January. Location: Left Lower Lip. Treatment(s): Staged Excision. Treated by: Done by Mohs in [place]         Rosacea, unspecified    Xerosis cutis        Objective   Well appearing patient in no apparent distress; mood and affect are within normal limits.    A focused skin examination was performed. All findings within normal limits unless otherwise noted below.    Assessment/Plan   Skin Exam  1. DERMATITIS  Left Arm, Left Leg, Right Arm, Right Leg  Fading pink scaly plaques and patches symmetrically on arms and legs  ***  Related Medications  triamcinolone (Kenalog) 0.1 % cream  Apply topically 2 times a day. Apply to affected area of rash on arms and legs for 2 weeks then stop

## 2025-07-03 ENCOUNTER — PHARMACY VISIT (OUTPATIENT)
Dept: PHARMACY | Facility: CLINIC | Age: OVER 89
End: 2025-07-03
Payer: MEDICARE

## 2025-07-08 ENCOUNTER — APPOINTMENT (OUTPATIENT)
Dept: GERIATRIC MEDICINE | Facility: CLINIC | Age: OVER 89
End: 2025-07-08
Payer: MEDICARE

## 2025-07-15 ENCOUNTER — APPOINTMENT (OUTPATIENT)
Dept: RHEUMATOLOGY | Facility: CLINIC | Age: OVER 89
End: 2025-07-15
Payer: MEDICARE

## 2025-07-24 ENCOUNTER — APPOINTMENT (OUTPATIENT)
Dept: DERMATOLOGY | Facility: CLINIC | Age: OVER 89
End: 2025-07-24
Payer: MEDICARE

## 2025-07-24 DIAGNOSIS — L57.8 ACTINIC SKIN DAMAGE: ICD-10-CM

## 2025-07-24 DIAGNOSIS — Z85.828 PERSONAL HISTORY OF SKIN CANCER: ICD-10-CM

## 2025-07-24 DIAGNOSIS — Z12.83 SCREENING EXAM FOR SKIN CANCER: ICD-10-CM

## 2025-07-24 DIAGNOSIS — D22.9 MULTIPLE BENIGN NEVI: ICD-10-CM

## 2025-07-24 DIAGNOSIS — L82.1 SEBORRHEIC KERATOSIS: ICD-10-CM

## 2025-07-24 DIAGNOSIS — L81.4 LENTIGO: ICD-10-CM

## 2025-07-24 DIAGNOSIS — L28.0 LICHEN SIMPLEX CHRONICUS: Primary | ICD-10-CM

## 2025-07-24 PROCEDURE — 1159F MED LIST DOCD IN RCRD: CPT | Performed by: DERMATOLOGY

## 2025-07-24 PROCEDURE — 99213 OFFICE O/P EST LOW 20 MIN: CPT | Performed by: DERMATOLOGY

## 2025-07-24 ASSESSMENT — DERMATOLOGY PATIENT ASSESSMENT
HAVE YOU HAD OR DO YOU HAVE A STAPH INFECTION: NO
DO YOU USE A TANNING BED: NO
ARE YOU ON BIRTH CONTROL: NO
ARE YOU AN ORGAN TRANSPLANT RECIPIENT: NO
HAVE YOU HAD OR DO YOU HAVE VASCULAR DISEASE: NO
ARE YOU TRYING TO GET PREGNANT: NO
DO YOU HAVE IRREGULAR MENSTRUAL CYCLES: NO
DO YOU HAVE ANY NEW OR CHANGING LESIONS: YES
DO YOU USE SUNSCREEN: OCCASIONALLY

## 2025-07-24 ASSESSMENT — DERMATOLOGY QUALITY OF LIFE (QOL) ASSESSMENT
RATE HOW BOTHERED YOU ARE BY EFFECTS OF YOUR SKIN PROBLEMS ON YOUR ACTIVITIES (EG, GOING OUT, ACCOMPLISHING WHAT YOU WANT, WORK ACTIVITIES OR YOUR RELATIONSHIPS WITH OTHERS): 0 - NEVER BOTHERED
RATE HOW BOTHERED YOU ARE BY SYMPTOMS OF YOUR SKIN PROBLEM (EG, ITCHING, STINGING BURNING, HURTING OR SKIN IRRITATION): 0 - NEVER BOTHERED
RATE HOW EMOTIONALLY BOTHERED YOU ARE BY YOUR SKIN PROBLEM (FOR EXAMPLE, WORRY, EMBARRASSMENT, FRUSTRATION): 0 - NEVER BOTHERED
DATE THE QUALITY-OF-LIFE ASSESSMENT WAS COMPLETED: 67410

## 2025-07-24 ASSESSMENT — PATIENT GLOBAL ASSESSMENT (PGA): PATIENT GLOBAL ASSESSMENT: PATIENT GLOBAL ASSESSMENT:  1 - CLEAR

## 2025-07-24 ASSESSMENT — ITCH NUMERIC RATING SCALE: HOW SEVERE IS YOUR ITCHING?: 0

## 2025-07-24 NOTE — Clinical Note
Light pink erythema, with one circular thin white plaque on left labium majorum. She has been noted to have a healing erosion on left labium majorum before

## 2025-07-24 NOTE — Clinical Note
Lichen simplex chronicus with vaginal irritation  - bx 10/2023 with Dr. Lentz consistent with lichen simplex chronicus  - bland emollients and premarin  - follows with Dr. Lentz in urogynecology    - previously with healing erosion on left labium majorum now with white thin plaque. Asymptomatic. Due to age agreed to monitor closely and re-consider biopsy at next visit if still present

## 2025-07-24 NOTE — Clinical Note
Full body skin exam     -No lesions concerning for malignancy on the remainder the skin exam today   - The ugly duckling sign was discussed. Monitor for any skin lesions that are different in color, shape, or size than others on body  -Sun protection was discussed. Recommend SPF 30+, hats with brims, sun protective clothing, and avoiding sun exposure between 10 AM and 2 PM whenever possible  -Recommend regular skin exams or sooner if new or changing lesions

## 2025-07-24 NOTE — Clinical Note
Background of photodamage with hyper- and hypo-pigmented macules on the skin    A few small actinic keratoses on nose and forehead, monitor due to age

## 2025-08-05 NOTE — PROGRESS NOTES
Subjective     Grace Ahn is a 89 y.o. female who presents for the following: Skin Check.     Intake Questions  Do you have any new or changing Lesions?: Yes  Are you an organ transplant recipient?: No  Have you had or do you have a Staph Infection?: No  Have you had or do you have Vacular Disease?: No  Do you use sunscreen?: Occasionally  Do you use a tanning bed?: No  Are you trying to get pregnant?: No  Are you on birth control?: No  Do you have irregular menstrual cycles?: No    Review of Systems:  No other skin or systemic complaints other than what is documented elsewhere in the note.    The following portions of the chart were reviewed this encounter and updated as appropriate:         Skin Cancer History  Biopsy Log Book  No skin cancers from Specimen Tracking.    Additional History      Specialty Problems          Dermatology Problems    AK (actinic keratosis)    Formatting of this note might be different from the original. To back, face, scalp Formatting of this note might be different from the original. Below nose,upper lip,left shoulder         Lichen simplex chronicus    Personal history of other malignant neoplasm of skin    Lesion 1: Squamous Cell Carcinoma. Year Diagnosed: 2014. November. Location: Forehead. Treatment(s): Staged Excision. Excision performed by Dr. Keshawn Schultz (Plastics) in [place]     Lesion 2: Basal Cell Carcinoma. Year Diagnosed: 2014. January. Location: Left Lower Lip. Treatment(s): Staged Excision. Treated by: Done by Mohs in [place]         Rosacea, unspecified    Xerosis cutis        Objective   Well appearing patient in no apparent distress; mood and affect are within normal limits.    A full examination was performed including scalp, head, eyes, ears, nose, lips, neck, chest, axillae, abdomen, back, buttocks, bilateral upper extremities, bilateral lower extremities, hands, feet, fingers, toes, fingernails, and toenails. All findings within normal limits unless  otherwise noted below.    Assessment/Plan   Skin Exam  1. LICHEN SIMPLEX CHRONICUS  Pubic  Light pink erythema, with one circular thin white plaque on left labium majorum. She has been noted to have a healing erosion on left labium majorum before    Lichen simplex chronicus with vaginal irritation  - bx 10/2023 with Dr. Lentz consistent with lichen simplex chronicus  - bland emollients and premarin  - follows with Dr. Lentz in urogynecology    - previously with healing erosion on left labium majorum now with white thin plaque. Asymptomatic. Due to age agreed to monitor closely and re-consider biopsy at next visit if still present  2. MULTIPLE BENIGN NEVI  Generalized  Brown and tan macules and papules with reassuring findings on dermoscopy  -These lesions have benign, reassuring patterns on dermoscopy  -Recommend continued self observation, and to contact the office if any changes in nevi are noticed  3. LENTIGO  Generalized  Tan macules  -Benign appearing on exam  -Reassurance, recommend observation  4. SEBORRHEIC KERATOSIS (3)  Generalized, Right Lower Leg - Posterior, Right Thigh - Anterior  Stuck on, waxy macule(s)/papule(s)/plaque(s) with comedo-like openings and milia like cysts  -Discussed the nature of the diagnosis  -Reassurance, recommend continued observation  5. ACTINIC SKIN DAMAGE  Generalized  Background of photodamage with hyper- and hypo-pigmented macules on the skin    A few small actinic keratoses on nose and forehead, monitor due to age   6. PERSONAL HISTORY OF SKIN CANCER  Generalized  Personal History of Non-Melanoma Skin Cancer  -Well healed scar(s) with no evidence of recurrence  -Discussed the need for annual or semi-annual skin examinations and to return sooner if any new or changing lesions are noticed. Patient verbalizes understanding  7. SCREENING EXAM FOR SKIN CANCER  Generalized    Full body skin exam     -No lesions concerning for malignancy on the remainder the skin exam today   - The  ugly duckling sign was discussed. Monitor for any skin lesions that are different in color, shape, or size than others on body  -Sun protection was discussed. Recommend SPF 30+, hats with brims, sun protective clothing, and avoiding sun exposure between 10 AM and 2 PM whenever possible  -Recommend regular skin exams or sooner if new or changing lesions       Follow up 11/2025 as scheduled

## 2025-08-12 ENCOUNTER — CLINICAL SUPPORT (OUTPATIENT)
Dept: PHYSICAL THERAPY | Facility: CLINIC | Age: OVER 89
End: 2025-08-12
Payer: MEDICARE

## 2025-08-12 DIAGNOSIS — R42 VERTIGO: Primary | ICD-10-CM

## 2025-08-12 DIAGNOSIS — H81.12 BENIGN PAROXYSMAL POSITIONAL VERTIGO OF LEFT EAR: ICD-10-CM

## 2025-08-12 PROCEDURE — 97161 PT EVAL LOW COMPLEX 20 MIN: CPT | Mod: GP

## 2025-08-12 PROCEDURE — 95992 CANALITH REPOSITIONING PROC: CPT | Mod: GP

## 2025-08-19 ENCOUNTER — OFFICE VISIT (OUTPATIENT)
Dept: GERIATRIC MEDICINE | Facility: CLINIC | Age: OVER 89
End: 2025-08-19
Payer: MEDICARE

## 2025-08-19 VITALS
HEART RATE: 72 BPM | BODY MASS INDEX: 22.45 KG/M2 | WEIGHT: 118.8 LBS | SYSTOLIC BLOOD PRESSURE: 136 MMHG | TEMPERATURE: 94.3 F | DIASTOLIC BLOOD PRESSURE: 73 MMHG

## 2025-08-19 DIAGNOSIS — R55 SYNCOPE AND COLLAPSE: ICD-10-CM

## 2025-08-19 DIAGNOSIS — R45.86 MOOD AND AFFECT DISTURBANCE: ICD-10-CM

## 2025-08-19 DIAGNOSIS — M85.852 OSTEOPENIA OF LEFT HIP: ICD-10-CM

## 2025-08-19 DIAGNOSIS — R41.89 COGNITIVE IMPAIRMENT: Primary | ICD-10-CM

## 2025-08-19 DIAGNOSIS — R42 VERTIGO: ICD-10-CM

## 2025-08-19 DIAGNOSIS — I10 PRIMARY HYPERTENSION: ICD-10-CM

## 2025-08-19 DIAGNOSIS — R32 URINARY INCONTINENCE, UNSPECIFIED TYPE: ICD-10-CM

## 2025-08-19 PROCEDURE — 3075F SYST BP GE 130 - 139MM HG: CPT | Performed by: INTERNAL MEDICINE

## 2025-08-19 PROCEDURE — G2211 COMPLEX E/M VISIT ADD ON: HCPCS | Performed by: INTERNAL MEDICINE

## 2025-08-19 PROCEDURE — 99214 OFFICE O/P EST MOD 30 MIN: CPT | Performed by: INTERNAL MEDICINE

## 2025-08-19 PROCEDURE — 3078F DIAST BP <80 MM HG: CPT | Performed by: INTERNAL MEDICINE

## 2025-08-19 PROCEDURE — 1126F AMNT PAIN NOTED NONE PRSNT: CPT | Performed by: INTERNAL MEDICINE

## 2025-08-19 ASSESSMENT — ENCOUNTER SYMPTOMS
DEPRESSION: 0
OCCASIONAL FEELINGS OF UNSTEADINESS: 0
LOSS OF SENSATION IN FEET: 0

## 2025-08-19 ASSESSMENT — MONTREAL COGNITIVE ASSESSMENT (MOCA)
4. NAME EACH OF THE THREE ANIMALS SHOWN: 3
7. [VIGILENCE] TAP WHEN HEARING DESIGNATED LETTER: 1
13. ORIENTATION SUBSCORE: 3
12. MEMORY INDEX SCORE: 0
11. FOR EACH PAIR OF WORDS, WHAT CATEGORY DO THEY BELONG TO (OUT OF 2): 0
WHAT IS THE TOTAL SCORE (OUT OF 30): 16
6. READ LIST OF DIGITS [FORWARD/BACKWARD]: 2
8. SERIAL SUBTRACTION OF 7S: 2
WHAT LEVEL OF EDUCATION WAS ATTAINED: 0
9. REPEAT EACH SENTENCE: 2
VISUOSPATIAL/EXECUTIVE SUBSCORE: 3
10. [FLUENCY] NAME WORDS STARTING WITH DESIGNATED LETTER: 0
5. MEMORY TRIALS: 0

## 2025-08-19 ASSESSMENT — PAIN SCALES - GENERAL: PAINLEVEL_OUTOF10: 0-NO PAIN

## 2025-08-21 ENCOUNTER — TELEPHONE (OUTPATIENT)
Dept: PHYSICAL THERAPY | Facility: CLINIC | Age: OVER 89
End: 2025-08-21
Payer: MEDICARE

## 2025-11-13 ENCOUNTER — APPOINTMENT (OUTPATIENT)
Dept: DERMATOLOGY | Facility: CLINIC | Age: OVER 89
End: 2025-11-13
Payer: MEDICARE

## 2025-11-20 ENCOUNTER — APPOINTMENT (OUTPATIENT)
Dept: INFUSION THERAPY | Facility: CLINIC | Age: OVER 89
End: 2025-11-20
Payer: MEDICARE

## 2026-04-02 ENCOUNTER — APPOINTMENT (OUTPATIENT)
Dept: DERMATOLOGY | Facility: CLINIC | Age: OVER 89
End: 2026-04-02
Payer: MEDICARE